# Patient Record
Sex: FEMALE | Race: WHITE | NOT HISPANIC OR LATINO | Employment: FULL TIME | URBAN - METROPOLITAN AREA
[De-identification: names, ages, dates, MRNs, and addresses within clinical notes are randomized per-mention and may not be internally consistent; named-entity substitution may affect disease eponyms.]

---

## 2023-11-17 ENCOUNTER — OFFICE VISIT (OUTPATIENT)
Dept: ORTHOPEDIC SURGERY | Facility: CLINIC | Age: 58
End: 2023-11-17
Payer: COMMERCIAL

## 2023-11-17 DIAGNOSIS — M75.41 IMPINGEMENT SYNDROME OF RIGHT SHOULDER: ICD-10-CM

## 2023-11-17 DIAGNOSIS — M17.11 PRIMARY OSTEOARTHRITIS OF RIGHT KNEE: Primary | ICD-10-CM

## 2023-11-17 PROCEDURE — 99214 OFFICE O/P EST MOD 30 MIN: CPT | Performed by: ORTHOPAEDIC SURGERY

## 2023-11-17 PROCEDURE — 1036F TOBACCO NON-USER: CPT | Performed by: ORTHOPAEDIC SURGERY

## 2023-11-17 RX ORDER — DICLOFENAC SODIUM 75 MG/1
75 TABLET, DELAYED RELEASE ORAL 2 TIMES DAILY
Qty: 60 TABLET | Refills: 2 | Status: SHIPPED | OUTPATIENT
Start: 2023-11-17 | End: 2024-02-15

## 2023-11-17 RX ORDER — TRAZODONE HYDROCHLORIDE 50 MG/1
50 TABLET ORAL NIGHTLY
COMMUNITY

## 2023-11-17 RX ORDER — OMEPRAZOLE 20 MG/1
20 CAPSULE, DELAYED RELEASE ORAL
COMMUNITY

## 2023-11-17 RX ORDER — DIAZEPAM 10 MG/1
10 TABLET ORAL NIGHTLY PRN
COMMUNITY
Start: 2011-08-04 | End: 2024-01-11 | Stop reason: SDUPTHER

## 2023-11-17 RX ORDER — SERTRALINE HYDROCHLORIDE 100 MG/1
200 TABLET, FILM COATED ORAL DAILY
COMMUNITY

## 2023-11-17 RX ORDER — BUSPIRONE HYDROCHLORIDE 10 MG/1
10 TABLET ORAL 2 TIMES DAILY
COMMUNITY
Start: 2022-01-31

## 2023-11-17 RX ORDER — LEVOTHYROXINE SODIUM 100 UG/1
100 TABLET ORAL
COMMUNITY
End: 2024-04-24 | Stop reason: SDUPTHER

## 2023-11-17 NOTE — PROGRESS NOTES
History: Beverly is here for her right knee and shoulder.  She had a previous knee scope a year and a half ago and is getting some recurrent pain and swelling.  Hurts mainly on the inside of the knee.  She also has a shoulder issue.  We tried a cortisone injection but is still having trouble lifting and doing overhead activities.  She has tried meloxicam as well without much improvement.    Past medical history: Multiple  Medications: Multiple  Allergies: No known drug allergies    Please refer to the intake H&P regarding the patient's review of systems, family history and social history as was done today    HEENT: Normal  Lungs: Clear to auscultation  Heart: RRR  Abdomen: Soft, nontender  Skin: clear  Extremity: She has a trace effusion in the right knee.  There is tenderness more medially.  Mild pain medially with Geoff's and over.  Negative Lachman and posterior drawer.  No numbness today.  Shoulders and shows full overhead motion with pain beyond 90 degrees.  She has 5-5 strength but significant pain during stressing.  There is pain with impingement overs as well.  Good passive motion at the side.  Contralateral exam is normal for strength, motion, stability and neurovascular assessment.    Radiographs: Previous knee x-ray showed moderate degenerative change with medial joint space narrowing.  Arthroscopy pictures did show developing arthritic changes well.  Shoulder x-rays were negative.    Assessment: Moderate right knee DJD with recurrent pain and swelling status post prior arthroscopy.  Right shoulder impingement with possible internal derangement.    Plan: She is getting more shoulder pain and dysfunction has had an injection and exercises.  I recommended an MRI and I will see her back to go the results.  In regards to the knee she is getting some recurrent pain and swelling but is trying to remain active.  We will start her on some Voltaren and will stop it causes any stomach upset.  She also like to  pursue viscosupplementation and we will submit for approval.  We will see her back as directed.  All questions were answered today with the patient.    This note was generated with voice recognition software and may contain grammatical errors.

## 2023-11-28 DIAGNOSIS — M75.41 IMPINGEMENT SYNDROME OF RIGHT SHOULDER: ICD-10-CM

## 2023-12-06 ENCOUNTER — APPOINTMENT (OUTPATIENT)
Dept: ORTHOPEDIC SURGERY | Facility: CLINIC | Age: 58
End: 2023-12-06
Payer: COMMERCIAL

## 2023-12-13 ENCOUNTER — APPOINTMENT (OUTPATIENT)
Dept: ORTHOPEDIC SURGERY | Facility: CLINIC | Age: 58
End: 2023-12-13
Payer: COMMERCIAL

## 2023-12-20 ENCOUNTER — APPOINTMENT (OUTPATIENT)
Dept: ORTHOPEDIC SURGERY | Facility: CLINIC | Age: 58
End: 2023-12-20
Payer: COMMERCIAL

## 2023-12-28 ENCOUNTER — APPOINTMENT (OUTPATIENT)
Dept: RADIOLOGY | Facility: HOSPITAL | Age: 58
End: 2023-12-28
Payer: COMMERCIAL

## 2023-12-28 ENCOUNTER — HOSPITAL ENCOUNTER (EMERGENCY)
Facility: HOSPITAL | Age: 58
Discharge: HOME | End: 2023-12-28
Attending: STUDENT IN AN ORGANIZED HEALTH CARE EDUCATION/TRAINING PROGRAM
Payer: COMMERCIAL

## 2023-12-28 VITALS
BODY MASS INDEX: 20.88 KG/M2 | OXYGEN SATURATION: 96 % | WEIGHT: 133 LBS | HEIGHT: 67 IN | TEMPERATURE: 97.2 F | SYSTOLIC BLOOD PRESSURE: 131 MMHG | RESPIRATION RATE: 15 BRPM | HEART RATE: 78 BPM | DIASTOLIC BLOOD PRESSURE: 59 MMHG

## 2023-12-28 DIAGNOSIS — S22.41XA CLOSED FRACTURE OF MULTIPLE RIBS OF RIGHT SIDE, INITIAL ENCOUNTER: Primary | ICD-10-CM

## 2023-12-28 PROCEDURE — 73030 X-RAY EXAM OF SHOULDER: CPT | Mod: RT

## 2023-12-28 PROCEDURE — 72125 CT NECK SPINE W/O DYE: CPT | Performed by: RADIOLOGY

## 2023-12-28 PROCEDURE — 73060 X-RAY EXAM OF HUMERUS: CPT | Mod: RT

## 2023-12-28 PROCEDURE — 72125 CT NECK SPINE W/O DYE: CPT

## 2023-12-28 PROCEDURE — 2500000005 HC RX 250 GENERAL PHARMACY W/O HCPCS: Performed by: STUDENT IN AN ORGANIZED HEALTH CARE EDUCATION/TRAINING PROGRAM

## 2023-12-28 PROCEDURE — 73090 X-RAY EXAM OF FOREARM: CPT | Mod: RIGHT SIDE | Performed by: SURGERY

## 2023-12-28 PROCEDURE — 70450 CT HEAD/BRAIN W/O DYE: CPT

## 2023-12-28 PROCEDURE — 70450 CT HEAD/BRAIN W/O DYE: CPT | Performed by: RADIOLOGY

## 2023-12-28 PROCEDURE — 99284 EMERGENCY DEPT VISIT MOD MDM: CPT | Performed by: STUDENT IN AN ORGANIZED HEALTH CARE EDUCATION/TRAINING PROGRAM

## 2023-12-28 PROCEDURE — 73130 X-RAY EXAM OF HAND: CPT | Mod: RT

## 2023-12-28 PROCEDURE — 73090 X-RAY EXAM OF FOREARM: CPT | Mod: RT

## 2023-12-28 PROCEDURE — 71101 X-RAY EXAM UNILAT RIBS/CHEST: CPT | Mod: RT

## 2023-12-28 PROCEDURE — 73130 X-RAY EXAM OF HAND: CPT | Mod: RIGHT SIDE | Performed by: SURGERY

## 2023-12-28 PROCEDURE — 73030 X-RAY EXAM OF SHOULDER: CPT | Mod: RIGHT SIDE | Performed by: STUDENT IN AN ORGANIZED HEALTH CARE EDUCATION/TRAINING PROGRAM

## 2023-12-28 PROCEDURE — 99285 EMERGENCY DEPT VISIT HI MDM: CPT | Performed by: STUDENT IN AN ORGANIZED HEALTH CARE EDUCATION/TRAINING PROGRAM

## 2023-12-28 PROCEDURE — 73110 X-RAY EXAM OF WRIST: CPT | Mod: RT

## 2023-12-28 PROCEDURE — 71101 X-RAY EXAM UNILAT RIBS/CHEST: CPT | Mod: RIGHT SIDE | Performed by: SURGERY

## 2023-12-28 PROCEDURE — 73110 X-RAY EXAM OF WRIST: CPT | Mod: RIGHT SIDE | Performed by: STUDENT IN AN ORGANIZED HEALTH CARE EDUCATION/TRAINING PROGRAM

## 2023-12-28 PROCEDURE — 73060 X-RAY EXAM OF HUMERUS: CPT | Mod: RIGHT SIDE | Performed by: SURGERY

## 2023-12-28 RX ORDER — LIDOCAINE 560 MG/1
1 PATCH PERCUTANEOUS; TOPICAL; TRANSDERMAL ONCE
Status: DISCONTINUED | OUTPATIENT
Start: 2023-12-28 | End: 2023-12-29 | Stop reason: HOSPADM

## 2023-12-28 RX ORDER — ACETAMINOPHEN 325 MG/1
650 TABLET ORAL ONCE
Status: COMPLETED | OUTPATIENT
Start: 2023-12-28 | End: 2023-12-28

## 2023-12-28 RX ORDER — LIDOCAINE 50 MG/G
1 PATCH TOPICAL DAILY
Qty: 4 PATCH | Refills: 0 | Status: SHIPPED | OUTPATIENT
Start: 2023-12-28 | End: 2024-01-01

## 2023-12-28 RX ADMIN — LIDOCAINE 1 PATCH: 4 PATCH TOPICAL at 23:19

## 2023-12-28 RX ADMIN — ACETAMINOPHEN 650 MG: 325 TABLET ORAL at 20:56

## 2023-12-28 ASSESSMENT — PAIN DESCRIPTION - LOCATION
LOCATION: SHOULDER
LOCATION: OTHER (COMMENT)

## 2023-12-28 ASSESSMENT — COLUMBIA-SUICIDE SEVERITY RATING SCALE - C-SSRS
6. HAVE YOU EVER DONE ANYTHING, STARTED TO DO ANYTHING, OR PREPARED TO DO ANYTHING TO END YOUR LIFE?: NO
2. HAVE YOU ACTUALLY HAD ANY THOUGHTS OF KILLING YOURSELF?: NO
1. IN THE PAST MONTH, HAVE YOU WISHED YOU WERE DEAD OR WISHED YOU COULD GO TO SLEEP AND NOT WAKE UP?: NO

## 2023-12-28 ASSESSMENT — PAIN DESCRIPTION - ORIENTATION: ORIENTATION: RIGHT

## 2023-12-28 ASSESSMENT — PAIN SCALES - GENERAL
PAINLEVEL_OUTOF10: 10 - WORST POSSIBLE PAIN

## 2023-12-28 ASSESSMENT — PAIN - FUNCTIONAL ASSESSMENT: PAIN_FUNCTIONAL_ASSESSMENT: 0-10

## 2023-12-28 ASSESSMENT — LIFESTYLE VARIABLES
EVER FELT BAD OR GUILTY ABOUT YOUR DRINKING: NO
EVER HAD A DRINK FIRST THING IN THE MORNING TO STEADY YOUR NERVES TO GET RID OF A HANGOVER: NO
REASON UNABLE TO ASSESS: NO
HAVE YOU EVER FELT YOU SHOULD CUT DOWN ON YOUR DRINKING: NO
HAVE PEOPLE ANNOYED YOU BY CRITICIZING YOUR DRINKING: NO

## 2023-12-28 ASSESSMENT — PAIN DESCRIPTION - PAIN TYPE: TYPE: ACUTE PAIN

## 2023-12-29 ENCOUNTER — TELEPHONE (OUTPATIENT)
Dept: PRIMARY CARE | Facility: CLINIC | Age: 58
End: 2023-12-29
Payer: COMMERCIAL

## 2023-12-29 NOTE — TELEPHONE ENCOUNTER
PATIENT CALLED IN FOR A REFILL ON HER DIAZEPAM. SHE HAS NOT BEEN SEEN SINCE 02/2023 AND LAST CONTRACT/UDS WAS DONE IN 2022.     LMOM THAT SHE WILL NEED TO SCHEDULE AN APPOINTMENT FOR A REFILL AND DID ADVISE TO LET US KNOW IF SHE NEEDS A SHORT SUPPLY CALLED IN UNTIL HER APPOINTMENT.

## 2023-12-29 NOTE — DISCHARGE INSTRUCTIONS
Please follow up with your primary care physician within 1-2 days.  Please use your incentive spirometer as directed.  If you have shortness of breath, chest pain, worsening symptoms, or other concerning symptoms, please seek immediate medical attention and come back into the ER.    If you have a return or worsening of symptoms, please seek immediate medical attention.
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
## 2024-01-04 ENCOUNTER — ANCILLARY PROCEDURE (OUTPATIENT)
Dept: RADIOLOGY | Facility: CLINIC | Age: 59
End: 2024-01-04
Payer: COMMERCIAL

## 2024-01-04 DIAGNOSIS — M75.41 IMPINGEMENT SYNDROME OF RIGHT SHOULDER: ICD-10-CM

## 2024-01-04 PROCEDURE — 73221 MRI JOINT UPR EXTREM W/O DYE: CPT | Mod: RIGHT SIDE | Performed by: RADIOLOGY

## 2024-01-04 PROCEDURE — 73221 MRI JOINT UPR EXTREM W/O DYE: CPT | Mod: RT
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
## 2024-01-11 ENCOUNTER — OFFICE VISIT (OUTPATIENT)
Dept: PRIMARY CARE | Facility: CLINIC | Age: 59
End: 2024-01-11
Payer: COMMERCIAL

## 2024-01-11 VITALS
DIASTOLIC BLOOD PRESSURE: 70 MMHG | OXYGEN SATURATION: 97 % | RESPIRATION RATE: 18 BRPM | BODY MASS INDEX: 21.11 KG/M2 | HEART RATE: 61 BPM | SYSTOLIC BLOOD PRESSURE: 138 MMHG | WEIGHT: 134.8 LBS

## 2024-01-11 DIAGNOSIS — G47.9 SLEEP DIFFICULTIES: ICD-10-CM

## 2024-01-11 PROCEDURE — 1036F TOBACCO NON-USER: CPT | Performed by: FAMILY MEDICINE

## 2024-01-11 PROCEDURE — 99213 OFFICE O/P EST LOW 20 MIN: CPT | Performed by: FAMILY MEDICINE

## 2024-01-11 RX ORDER — DIAZEPAM 10 MG/1
10 TABLET ORAL NIGHTLY PRN
Qty: 30 TABLET | Refills: 2 | Status: SHIPPED | OUTPATIENT
Start: 2024-01-11 | End: 2024-02-09 | Stop reason: SDUPTHER

## 2024-01-15 ENCOUNTER — APPOINTMENT (OUTPATIENT)
Dept: ORTHOPEDIC SURGERY | Facility: CLINIC | Age: 59
End: 2024-01-15
Payer: COMMERCIAL

## 2024-01-15 ENCOUNTER — OFFICE VISIT (OUTPATIENT)
Dept: ORTHOPEDIC SURGERY | Facility: CLINIC | Age: 59
End: 2024-01-15
Payer: COMMERCIAL

## 2024-01-15 DIAGNOSIS — S46.011A TRAUMATIC COMPLETE TEAR OF RIGHT ROTATOR CUFF, INITIAL ENCOUNTER: Primary | ICD-10-CM

## 2024-01-15 PROCEDURE — 1036F TOBACCO NON-USER: CPT | Performed by: PHYSICIAN ASSISTANT

## 2024-01-15 PROCEDURE — 99214 OFFICE O/P EST MOD 30 MIN: CPT | Performed by: PHYSICIAN ASSISTANT

## 2024-01-15 RX ORDER — CELECOXIB 200 MG/1
200 CAPSULE ORAL DAILY
Qty: 30 CAPSULE | Refills: 0 | Status: SHIPPED | OUTPATIENT
Start: 2024-01-15 | End: 2024-02-14
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
## 2024-01-17 ENCOUNTER — APPOINTMENT (OUTPATIENT)
Dept: ORTHOPEDIC SURGERY | Facility: CLINIC | Age: 59
End: 2024-01-17
Payer: COMMERCIAL

## 2024-01-24 ENCOUNTER — APPOINTMENT (OUTPATIENT)
Dept: ORTHOPEDIC SURGERY | Facility: CLINIC | Age: 59
End: 2024-01-24
Payer: COMMERCIAL

## 2024-01-25 PROBLEM — S46.011A TRAUMATIC ROTATOR CUFF TEAR, RIGHT, INITIAL ENCOUNTER: Status: ACTIVE | Noted: 2024-01-25

## 2024-01-31 ENCOUNTER — APPOINTMENT (OUTPATIENT)
Dept: ORTHOPEDIC SURGERY | Facility: CLINIC | Age: 59
End: 2024-01-31
Payer: COMMERCIAL
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
## 2024-02-01 ENCOUNTER — OFFICE VISIT (OUTPATIENT)
Dept: PRIMARY CARE | Facility: CLINIC | Age: 59
End: 2024-02-01
Payer: COMMERCIAL

## 2024-02-01 VITALS
SYSTOLIC BLOOD PRESSURE: 110 MMHG | HEART RATE: 79 BPM | TEMPERATURE: 98.1 F | OXYGEN SATURATION: 96 % | RESPIRATION RATE: 20 BRPM | DIASTOLIC BLOOD PRESSURE: 68 MMHG

## 2024-02-01 DIAGNOSIS — M25.511 ACUTE PAIN OF RIGHT SHOULDER: Primary | ICD-10-CM

## 2024-02-01 DIAGNOSIS — Z01.818 PREOP EXAMINATION: ICD-10-CM

## 2024-02-01 PROCEDURE — 93000 ELECTROCARDIOGRAM COMPLETE: CPT | Performed by: FAMILY MEDICINE

## 2024-02-01 PROCEDURE — 1036F TOBACCO NON-USER: CPT | Performed by: FAMILY MEDICINE

## 2024-02-01 PROCEDURE — 99213 OFFICE O/P EST LOW 20 MIN: CPT | Performed by: FAMILY MEDICINE

## 2024-02-01 RX ORDER — OXYCODONE AND ACETAMINOPHEN 5; 325 MG/1; MG/1
1 TABLET ORAL EVERY 8 HOURS PRN
Qty: 21 TABLET | Refills: 0 | Status: SHIPPED | OUTPATIENT
Start: 2024-02-01 | End: 2024-02-08

## 2024-02-08 ENCOUNTER — TELEPHONE (OUTPATIENT)
Dept: PRIMARY CARE | Facility: CLINIC | Age: 59
End: 2024-02-08

## 2024-02-08 ENCOUNTER — LAB (OUTPATIENT)
Dept: LAB | Facility: LAB | Age: 59
End: 2024-02-08
Payer: COMMERCIAL

## 2024-02-08 DIAGNOSIS — G47.9 SLEEP DIFFICULTIES: ICD-10-CM

## 2024-02-08 DIAGNOSIS — S46.011A TRAUMATIC ROTATOR CUFF TEAR, RIGHT, INITIAL ENCOUNTER: ICD-10-CM

## 2024-02-08 LAB
ALBUMIN SERPL BCP-MCNC: 4.6 G/DL (ref 3.4–5)
ALP SERPL-CCNC: 59 U/L (ref 33–110)
ALT SERPL W P-5'-P-CCNC: 17 U/L (ref 7–45)
ANION GAP SERPL CALC-SCNC: 10 MMOL/L (ref 10–20)
APPEARANCE UR: ABNORMAL
AST SERPL W P-5'-P-CCNC: 16 U/L (ref 9–39)
BACTERIA #/AREA URNS AUTO: ABNORMAL /HPF
BASOPHILS # BLD AUTO: 0.02 X10*3/UL (ref 0–0.1)
BASOPHILS NFR BLD AUTO: 0.4 %
BILIRUB SERPL-MCNC: 0.3 MG/DL (ref 0–1.2)
BILIRUB UR STRIP.AUTO-MCNC: NEGATIVE MG/DL
BUN SERPL-MCNC: 16 MG/DL (ref 6–23)
CALCIUM SERPL-MCNC: 9.8 MG/DL (ref 8.6–10.3)
CHLORIDE SERPL-SCNC: 103 MMOL/L (ref 98–107)
CO2 SERPL-SCNC: 31 MMOL/L (ref 21–32)
COLOR UR: YELLOW
CREAT SERPL-MCNC: 0.77 MG/DL (ref 0.5–1.05)
EGFRCR SERPLBLD CKD-EPI 2021: 89 ML/MIN/1.73M*2
EOSINOPHIL # BLD AUTO: 0.02 X10*3/UL (ref 0–0.7)
EOSINOPHIL NFR BLD AUTO: 0.4 %
ERYTHROCYTE [DISTWIDTH] IN BLOOD BY AUTOMATED COUNT: 12.4 % (ref 11.5–14.5)
GLUCOSE SERPL-MCNC: 97 MG/DL (ref 74–99)
GLUCOSE UR STRIP.AUTO-MCNC: NEGATIVE MG/DL
HCT VFR BLD AUTO: 39.3 % (ref 36–46)
HGB BLD-MCNC: 13.2 G/DL (ref 12–16)
IMM GRANULOCYTES # BLD AUTO: 0.01 X10*3/UL (ref 0–0.7)
IMM GRANULOCYTES NFR BLD AUTO: 0.2 % (ref 0–0.9)
INR PPP: 0.9 (ref 0.9–1.1)
KETONES UR STRIP.AUTO-MCNC: NEGATIVE MG/DL
LEUKOCYTE ESTERASE UR QL STRIP.AUTO: ABNORMAL
LYMPHOCYTES # BLD AUTO: 1.24 X10*3/UL (ref 1.2–4.8)
LYMPHOCYTES NFR BLD AUTO: 23.4 %
MCH RBC QN AUTO: 30.1 PG (ref 26–34)
MCHC RBC AUTO-ENTMCNC: 33.6 G/DL (ref 32–36)
MCV RBC AUTO: 90 FL (ref 80–100)
MONOCYTES # BLD AUTO: 0.34 X10*3/UL (ref 0.1–1)
MONOCYTES NFR BLD AUTO: 6.4 %
NEUTROPHILS # BLD AUTO: 3.68 X10*3/UL (ref 1.2–7.7)
NEUTROPHILS NFR BLD AUTO: 69.2 %
NITRITE UR QL STRIP.AUTO: NEGATIVE
NRBC BLD-RTO: 0 /100 WBCS (ref 0–0)
PH UR STRIP.AUTO: 6 [PH]
PLATELET # BLD AUTO: 198 X10*3/UL (ref 150–450)
POTASSIUM SERPL-SCNC: 4 MMOL/L (ref 3.5–5.3)
PROT SERPL-MCNC: 6.8 G/DL (ref 6.4–8.2)
PROT UR STRIP.AUTO-MCNC: NEGATIVE MG/DL
PROTHROMBIN TIME: 10.6 SECONDS (ref 9.8–12.8)
RBC # BLD AUTO: 4.39 X10*6/UL (ref 4–5.2)
RBC # UR STRIP.AUTO: NEGATIVE /UL
RBC #/AREA URNS AUTO: ABNORMAL /HPF
SODIUM SERPL-SCNC: 140 MMOL/L (ref 136–145)
SP GR UR STRIP.AUTO: 1.02
SQUAMOUS #/AREA URNS AUTO: ABNORMAL /HPF
UROBILINOGEN UR STRIP.AUTO-MCNC: <2 MG/DL
WBC # BLD AUTO: 5.3 X10*3/UL (ref 4.4–11.3)
WBC #/AREA URNS AUTO: ABNORMAL /HPF

## 2024-02-08 PROCEDURE — 87086 URINE CULTURE/COLONY COUNT: CPT

## 2024-02-08 PROCEDURE — 80053 COMPREHEN METABOLIC PANEL: CPT

## 2024-02-08 PROCEDURE — 85610 PROTHROMBIN TIME: CPT

## 2024-02-08 PROCEDURE — 36415 COLL VENOUS BLD VENIPUNCTURE: CPT

## 2024-02-08 PROCEDURE — 85025 COMPLETE CBC W/AUTO DIFF WBC: CPT

## 2024-02-08 PROCEDURE — 81001 URINALYSIS AUTO W/SCOPE: CPT

## 2024-02-08 NOTE — TELEPHONE ENCOUNTER
Called patient and lvmtcb    Unfortunately her EKG did not save in our system for her pre-op at her LOV. When patient returns the call, please have her stop by the office for nurse visit EKG.

## 2024-02-09 LAB
BACTERIA UR CULT: NORMAL
HOLD SPECIMEN: NORMAL

## 2024-02-09 NOTE — TELEPHONE ENCOUNTER
----- Message from Beverly Lucas sent at 2/8/2024 11:57 PM EST -----  Regarding: Urinalysis   Contact: [phone]  Sierra Malone!!! Please look over my urine lab results. Apparently I have a UTI or something going on. I don’t have any symptoms of a UTI but it looks like I have one. Can you prescribe me an antibiotic or does Dr. Talavera have to? The sooner the better I get on one. Also, the Valium script you sent to [facility] was filled for only 30 pills with no refills. In the past you’ve written it for a 3 month supply with no refills.  Your nurse informed me that you cannot prescribe three months worth anymore. She said you wrote it with 2 refills. It shows no refills. Can you send a Rx to [facility] closer to my surgery date with a refill? I only take it at night to help me sleep. The Percocet is working just okay. I still get up in the middle of the night in a lot of pain. Thank you for everything!!!!! Beverly Lucas

## 2024-02-13 ENCOUNTER — TELEPHONE (OUTPATIENT)
Dept: PRIMARY CARE | Facility: CLINIC | Age: 59
End: 2024-02-13
Payer: COMMERCIAL

## 2024-02-13 NOTE — TELEPHONE ENCOUNTER
Urine Culture  Order: [id number] - Reflex for Order [id number]  Collected 2/8/2024 17:29       Status: Final result       Visible to patient: Yes (seen)       Dx: Traumatic rotator cuff tear, right, i...    Specimen Information: Clean Catch/Voided; Urine   0 Result Notes  Urine Culture No significant growth           Resulting Agency: [facility]           Specimen Collected: 02/08/24 17:29

## 2024-02-13 NOTE — TELEPHONE ENCOUNTER
Please advise.    Urine Culture  Order: [id number] - Reflex for Order [id number]  Collected 2/8/2024 17:29       Status: Final result       Visible to patient: Yes (seen)       Dx: Traumatic rotator cuff tear, right, i...    Specimen Information: Clean Catch/Voided; Urine   0 Result Notes  Urine Culture No significant growth           Resulting Agency: [facility]           Specimen Collected: 02/08/24 17:29

## 2024-02-13 NOTE — TELEPHONE ENCOUNTER
Chad Malone MD  [id number] Clinical Support Staff16 minutes ago (2:18 PM)       Do not see results for urine culture

## 2024-02-13 NOTE — TELEPHONE ENCOUNTER
PATIENT CALLING - REPORTS THAT SHE IS AWAITING URINE CULTURE RESULTS TO SEE IF SHE NEEDS TO GO ON AN ANTIBIOTIC - THE ONLY THING I CAN SEE IS URINE CULTURE ORDERS BY DR. GALLAGHER - CAN SOMEONE HELP PATIENT?

## 2024-02-13 NOTE — TELEPHONE ENCOUNTER
LMOM for patient to call back.      Chad Malone MD  [id number] [place] [id number] Clinical Support Staff11 minutes ago (3:37 PM)       Urine culture is negative.  She is cleared for the surgery.

## 2024-02-14 ENCOUNTER — OFFICE VISIT (OUTPATIENT)
Dept: ORTHOPEDIC SURGERY | Facility: CLINIC | Age: 59
End: 2024-02-14
Payer: COMMERCIAL

## 2024-02-14 ENCOUNTER — EVALUATION (OUTPATIENT)
Dept: PHYSICAL THERAPY | Facility: CLINIC | Age: 59
End: 2024-02-14
Payer: COMMERCIAL

## 2024-02-14 DIAGNOSIS — G89.29 CHRONIC RIGHT SHOULDER PAIN: Primary | ICD-10-CM

## 2024-02-14 DIAGNOSIS — G89.18 POST-OPERATIVE PAIN: ICD-10-CM

## 2024-02-14 DIAGNOSIS — M25.511 CHRONIC RIGHT SHOULDER PAIN: Primary | ICD-10-CM

## 2024-02-14 DIAGNOSIS — S46.011A TRAUMATIC COMPLETE TEAR OF RIGHT ROTATOR CUFF, INITIAL ENCOUNTER: ICD-10-CM

## 2024-02-14 DIAGNOSIS — S46.011A TRAUMATIC COMPLETE TEAR OF RIGHT ROTATOR CUFF, INITIAL ENCOUNTER: Primary | ICD-10-CM

## 2024-02-14 PROCEDURE — L3670 SO ACRO/CLAV CAN WEB PRE OTS: HCPCS | Performed by: PHYSICIAN ASSISTANT

## 2024-02-14 PROCEDURE — 97161 PT EVAL LOW COMPLEX 20 MIN: CPT | Mod: GP

## 2024-02-14 PROCEDURE — 97535 SELF CARE MNGMENT TRAINING: CPT | Mod: GP

## 2024-02-14 PROCEDURE — 99024 POSTOP FOLLOW-UP VISIT: CPT | Performed by: PHYSICIAN ASSISTANT

## 2024-02-14 PROCEDURE — 1036F TOBACCO NON-USER: CPT | Performed by: PHYSICIAN ASSISTANT

## 2024-02-14 RX ORDER — SODIUM CHLORIDE, SODIUM LACTATE, POTASSIUM CHLORIDE, CALCIUM CHLORIDE 600; 310; 30; 20 MG/100ML; MG/100ML; MG/100ML; MG/100ML
100 INJECTION, SOLUTION INTRAVENOUS CONTINUOUS
Status: CANCELLED | OUTPATIENT
Start: 2024-02-14

## 2024-02-14 RX ORDER — OXYCODONE AND ACETAMINOPHEN 5; 325 MG/1; MG/1
1 TABLET ORAL EVERY 6 HOURS PRN
Qty: 20 TABLET | Refills: 0 | Status: SHIPPED | OUTPATIENT
Start: 2024-02-14 | End: 2024-02-19

## 2024-02-14 ASSESSMENT — PATIENT HEALTH QUESTIONNAIRE - PHQ9
1. LITTLE INTEREST OR PLEASURE IN DOING THINGS: NOT AT ALL
2. FEELING DOWN, DEPRESSED OR HOPELESS: SEVERAL DAYS
10. IF YOU CHECKED OFF ANY PROBLEMS, HOW DIFFICULT HAVE THESE PROBLEMS MADE IT FOR YOU TO DO YOUR WORK, TAKE CARE OF THINGS AT HOME, OR GET ALONG WITH OTHER PEOPLE: SOMEWHAT DIFFICULT
SUM OF ALL RESPONSES TO PHQ9 QUESTIONS 1 AND 2: 1

## 2024-02-14 ASSESSMENT — ENCOUNTER SYMPTOMS
DEPRESSION: 1
LOSS OF SENSATION IN FEET: 0
OCCASIONAL FEELINGS OF UNSTEADINESS: 0

## 2024-02-14 NOTE — PROGRESS NOTES
Beverly Lucas is here today for a pre-op visit of her right shoulder.  She is scheduled for a right arthroscopic rotator cuff repair.    This is a pre-op visit to discuss the risks and benefits of surgery. The risks and benefits were fully explained to the patient. The patient wishes to proceed.     With any surgery, infection is a risk; usually 1-2%. It can become higher for individuals with diabetes, rheumatoid arthritis, previous surgery, individuals on oral steroids, or obese individuals. All of these issues were properly addressed and considered. Reassured all sterile techniques would be followed.  Severe infections may require removal of any prosthesis.    The patient will be given preop antibiotics. It was also explained to the patient that there will be some blood loss during the procedure but that blood transfusion is usually not necessary. It is also noted that with knee surgery a tourniquet is applied to lower the amount of blood loss during the case.    Pulmonary embolism and blood clots were also discussed with the patient and told that these can have fatal complications. It is explained to the patient that the use of KARIE hose, foot pumps, incentive spirometers, quick mobility and the use of blood thinners/Aspirin for a period of 21-28 days is the standard preventative care.     It was explained that surgery is often used to decrease pain, provide stability, and improve strength but individuals will have varying results postoperatively. There can be variation in motion and a risk of stiffness. It is also stated that occasionally we will have to manipulate an extremity if pain and stiffness persist. We also discussed there are limitations with some motions after certain surgeries.     Fracture, though rare, may also occur intraoperatively. There is also the possibility of nerve or vascular injuries as well. There is potential for continued numbness or tingling. The benefits of anesthesia were explained  to the patient.     All of the patient's questions were answered.     Results/Imaging: MRI showed a full-thickness tear of the supraspinatus and anterior infraspinatus with approximately 3 cm of retraction.  There is moderate atrophy noted.    Assessment: Full-thickness right rotator cuff tear    Plan:   I have personally reviewed the OARRS report for this patient. This report is scanned into the electronic medical record. I have considered the risks of abuse, dependence, addiction, and diversion. The patient's current pain level is 5.  Post operative pain medication was sent to the patient's pharmacy.  The patient will not begin taking this until after surgery.     We did discuss biceps options and she would prefer a biceps tenodesis if needed.    She did give us work leave paperwork and she would like to take at least a month of work.    She will follow up 1 week post op.    This note was generated with voice recognition software and may contain grammatical errors.
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
## 2024-02-15 RX ORDER — DIAZEPAM 10 MG/1
10 TABLET ORAL NIGHTLY PRN
Qty: 30 TABLET | Refills: 2 | Status: SHIPPED | OUTPATIENT
Start: 2024-02-15 | End: 2024-05-24 | Stop reason: SDUPTHER

## 2024-02-15 NOTE — TELEPHONE ENCOUNTER
Rebtelt message sent.     Chad Malone MD  [id number] [place] [id number] Clinical Support Staff36 minutes ago (12:51 PM)       Prescription was sent in

## 2024-02-16 ENCOUNTER — APPOINTMENT (OUTPATIENT)
Dept: PRIMARY CARE | Facility: CLINIC | Age: 59
End: 2024-02-16
Payer: COMMERCIAL

## 2024-02-20 NOTE — PREPROCEDURE INSTRUCTIONS
Pre op instructions given. Pt. NPO at midnight. Pt. Verbalized understanding.

## 2024-02-21 ENCOUNTER — APPOINTMENT (OUTPATIENT)
Dept: PHYSICAL THERAPY | Facility: CLINIC | Age: 59
End: 2024-02-21
Payer: COMMERCIAL

## 2024-02-21 ENCOUNTER — APPOINTMENT (OUTPATIENT)
Dept: ORTHOPEDIC SURGERY | Facility: CLINIC | Age: 59
End: 2024-02-21
Payer: COMMERCIAL

## 2024-02-22 ENCOUNTER — HOSPITAL ENCOUNTER (OUTPATIENT)
Facility: HOSPITAL | Age: 59
Setting detail: OUTPATIENT SURGERY
Discharge: HOME | End: 2024-02-22
Attending: ORTHOPAEDIC SURGERY | Admitting: ORTHOPAEDIC SURGERY
Payer: COMMERCIAL

## 2024-02-22 ENCOUNTER — ANESTHESIA (OUTPATIENT)
Dept: OPERATING ROOM | Facility: HOSPITAL | Age: 59
End: 2024-02-22
Payer: COMMERCIAL

## 2024-02-22 ENCOUNTER — ANESTHESIA EVENT (OUTPATIENT)
Dept: OPERATING ROOM | Facility: HOSPITAL | Age: 59
End: 2024-02-22
Payer: COMMERCIAL

## 2024-02-22 VITALS
SYSTOLIC BLOOD PRESSURE: 118 MMHG | BODY MASS INDEX: 20.76 KG/M2 | HEART RATE: 75 BPM | WEIGHT: 132.28 LBS | RESPIRATION RATE: 17 BRPM | HEIGHT: 67 IN | DIASTOLIC BLOOD PRESSURE: 68 MMHG | OXYGEN SATURATION: 96 % | TEMPERATURE: 97 F

## 2024-02-22 DIAGNOSIS — S46.011A TRAUMATIC ROTATOR CUFF TEAR, RIGHT, INITIAL ENCOUNTER: Primary | ICD-10-CM

## 2024-02-22 PROBLEM — E03.9 HYPOTHYROIDISM: Status: ACTIVE | Noted: 2024-02-22

## 2024-02-22 PROBLEM — F41.9 ANXIETY: Status: ACTIVE | Noted: 2024-02-22

## 2024-02-22 PROCEDURE — 3700000001 HC GENERAL ANESTHESIA TIME - INITIAL BASE CHARGE: Performed by: ORTHOPAEDIC SURGERY

## 2024-02-22 PROCEDURE — 2780000003 HC OR 278 NO HCPCS: Performed by: ORTHOPAEDIC SURGERY

## 2024-02-22 PROCEDURE — 2720000007 HC OR 272 NO HCPCS: Performed by: ORTHOPAEDIC SURGERY

## 2024-02-22 PROCEDURE — 2500000004 HC RX 250 GENERAL PHARMACY W/ HCPCS (ALT 636 FOR OP/ED): Performed by: PHYSICIAN ASSISTANT

## 2024-02-22 PROCEDURE — 29826 SHO ARTHRS SRG DECOMPRESSION: CPT | Performed by: PHYSICIAN ASSISTANT

## 2024-02-22 PROCEDURE — 3600000004 HC OR TIME - INITIAL BASE CHARGE - PROCEDURE LEVEL FOUR: Performed by: ORTHOPAEDIC SURGERY

## 2024-02-22 PROCEDURE — 29827 SHO ARTHRS SRG RT8TR CUF RPR: CPT | Performed by: PHYSICIAN ASSISTANT

## 2024-02-22 PROCEDURE — 29827 SHO ARTHRS SRG RT8TR CUF RPR: CPT | Performed by: ORTHOPAEDIC SURGERY

## 2024-02-22 PROCEDURE — 2500000004 HC RX 250 GENERAL PHARMACY W/ HCPCS (ALT 636 FOR OP/ED): Performed by: STUDENT IN AN ORGANIZED HEALTH CARE EDUCATION/TRAINING PROGRAM

## 2024-02-22 PROCEDURE — 7100000009 HC PHASE TWO TIME - INITIAL BASE CHARGE: Performed by: ORTHOPAEDIC SURGERY

## 2024-02-22 PROCEDURE — 29826 SHO ARTHRS SRG DECOMPRESSION: CPT | Performed by: ORTHOPAEDIC SURGERY

## 2024-02-22 PROCEDURE — 3600000009 HC OR TIME - EACH INCREMENTAL 1 MINUTE - PROCEDURE LEVEL FOUR: Performed by: ORTHOPAEDIC SURGERY

## 2024-02-22 PROCEDURE — 7100000010 HC PHASE TWO TIME - EACH INCREMENTAL 1 MINUTE: Performed by: ORTHOPAEDIC SURGERY

## 2024-02-22 PROCEDURE — 29823 SHO ARTHRS SRG XTNSV DBRDMT: CPT | Performed by: PHYSICIAN ASSISTANT

## 2024-02-22 PROCEDURE — 2500000005 HC RX 250 GENERAL PHARMACY W/O HCPCS: Performed by: STUDENT IN AN ORGANIZED HEALTH CARE EDUCATION/TRAINING PROGRAM

## 2024-02-22 PROCEDURE — 3700000002 HC GENERAL ANESTHESIA TIME - EACH INCREMENTAL 1 MINUTE: Performed by: ORTHOPAEDIC SURGERY

## 2024-02-22 PROCEDURE — 7100000001 HC RECOVERY ROOM TIME - INITIAL BASE CHARGE: Performed by: ORTHOPAEDIC SURGERY

## 2024-02-22 PROCEDURE — 29823 SHO ARTHRS SRG XTNSV DBRDMT: CPT | Performed by: ORTHOPAEDIC SURGERY

## 2024-02-22 PROCEDURE — 7100000002 HC RECOVERY ROOM TIME - EACH INCREMENTAL 1 MINUTE: Performed by: ORTHOPAEDIC SURGERY

## 2024-02-22 PROCEDURE — C1713 ANCHOR/SCREW BN/BN,TIS/BN: HCPCS | Performed by: ORTHOPAEDIC SURGERY

## 2024-02-22 DEVICE — SPEEDBRG IMP SYS W/BIO-COMP SWVLK
Type: IMPLANTABLE DEVICE | Site: SHOULDER | Status: FUNCTIONAL
Brand: ARTHREX®

## 2024-02-22 RX ORDER — DEXAMETHASONE SODIUM PHOSPHATE 4 MG/ML
INJECTION, SOLUTION INTRA-ARTICULAR; INTRALESIONAL; INTRAMUSCULAR; INTRAVENOUS; SOFT TISSUE AS NEEDED
Status: DISCONTINUED | OUTPATIENT
Start: 2024-02-22 | End: 2024-02-22

## 2024-02-22 RX ORDER — SODIUM CHLORIDE, SODIUM LACTATE, POTASSIUM CHLORIDE, CALCIUM CHLORIDE 600; 310; 30; 20 MG/100ML; MG/100ML; MG/100ML; MG/100ML
100 INJECTION, SOLUTION INTRAVENOUS CONTINUOUS
Status: DISCONTINUED | OUTPATIENT
Start: 2024-02-22 | End: 2024-02-22 | Stop reason: HOSPADM

## 2024-02-22 RX ORDER — ONDANSETRON HYDROCHLORIDE 2 MG/ML
INJECTION, SOLUTION INTRAVENOUS AS NEEDED
Status: DISCONTINUED | OUTPATIENT
Start: 2024-02-22 | End: 2024-02-22

## 2024-02-22 RX ORDER — CEFAZOLIN SODIUM 2 G/100ML
2 INJECTION, SOLUTION INTRAVENOUS ONCE
Status: COMPLETED | OUTPATIENT
Start: 2024-02-22 | End: 2024-02-22

## 2024-02-22 RX ORDER — MIDAZOLAM HYDROCHLORIDE 1 MG/ML
INJECTION, SOLUTION INTRAMUSCULAR; INTRAVENOUS AS NEEDED
Status: DISCONTINUED | OUTPATIENT
Start: 2024-02-22 | End: 2024-02-22

## 2024-02-22 RX ORDER — LABETALOL HYDROCHLORIDE 5 MG/ML
5 INJECTION, SOLUTION INTRAVENOUS ONCE AS NEEDED
Status: DISCONTINUED | OUTPATIENT
Start: 2024-02-22 | End: 2024-02-22 | Stop reason: HOSPADM

## 2024-02-22 RX ORDER — PROPOFOL 10 MG/ML
INJECTION, EMULSION INTRAVENOUS AS NEEDED
Status: DISCONTINUED | OUTPATIENT
Start: 2024-02-22 | End: 2024-02-22

## 2024-02-22 RX ORDER — OXYCODONE HYDROCHLORIDE 5 MG/1
5 TABLET ORAL EVERY 4 HOURS PRN
Status: DISCONTINUED | OUTPATIENT
Start: 2024-02-22 | End: 2024-02-22 | Stop reason: HOSPADM

## 2024-02-22 RX ORDER — ROCURONIUM BROMIDE 10 MG/ML
INJECTION, SOLUTION INTRAVENOUS AS NEEDED
Status: DISCONTINUED | OUTPATIENT
Start: 2024-02-22 | End: 2024-02-22

## 2024-02-22 RX ORDER — FENTANYL CITRATE 50 UG/ML
25 INJECTION, SOLUTION INTRAMUSCULAR; INTRAVENOUS EVERY 5 MIN PRN
Status: DISCONTINUED | OUTPATIENT
Start: 2024-02-22 | End: 2024-02-22 | Stop reason: HOSPADM

## 2024-02-22 RX ORDER — LIDOCAINE HYDROCHLORIDE 20 MG/ML
INJECTION, SOLUTION INFILTRATION; PERINEURAL AS NEEDED
Status: DISCONTINUED | OUTPATIENT
Start: 2024-02-22 | End: 2024-02-22

## 2024-02-22 RX ORDER — ONDANSETRON HYDROCHLORIDE 2 MG/ML
4 INJECTION, SOLUTION INTRAVENOUS ONCE AS NEEDED
Status: DISCONTINUED | OUTPATIENT
Start: 2024-02-22 | End: 2024-02-22 | Stop reason: HOSPADM

## 2024-02-22 RX ORDER — SODIUM CHLORIDE 0.9 G/100ML
IRRIGANT IRRIGATION AS NEEDED
Status: DISCONTINUED | OUTPATIENT
Start: 2024-02-22 | End: 2024-02-22 | Stop reason: HOSPADM

## 2024-02-22 RX ADMIN — CEFAZOLIN SODIUM 2 G: 2 INJECTION, SOLUTION INTRAVENOUS at 07:43

## 2024-02-22 RX ADMIN — ONDANSETRON 4 MG: 2 INJECTION, SOLUTION INTRAMUSCULAR; INTRAVENOUS at 08:48

## 2024-02-22 RX ADMIN — MIDAZOLAM 2 MG: 1 INJECTION INTRAMUSCULAR; INTRAVENOUS at 07:19

## 2024-02-22 RX ADMIN — LIDOCAINE HYDROCHLORIDE 60 MG: 20 INJECTION, SOLUTION INFILTRATION; PERINEURAL at 07:36

## 2024-02-22 RX ADMIN — SODIUM CHLORIDE, SODIUM LACTATE, POTASSIUM CHLORIDE, AND CALCIUM CHLORIDE 100 ML/HR: 600; 310; 30; 20 INJECTION, SOLUTION INTRAVENOUS at 07:00

## 2024-02-22 RX ADMIN — DEXAMETHASONE SODIUM PHOSPHATE 8 MG: 4 INJECTION, SOLUTION INTRAMUSCULAR; INTRAVENOUS at 07:43

## 2024-02-22 RX ADMIN — PROPOFOL 150 MG: 10 INJECTION, EMULSION INTRAVENOUS at 07:36

## 2024-02-22 RX ADMIN — DEXAMETHASONE SODIUM PHOSPHATE: 10 INJECTION INTRAMUSCULAR; INTRAVENOUS at 07:31

## 2024-02-22 RX ADMIN — PROPOFOL 50 MG: 10 INJECTION, EMULSION INTRAVENOUS at 08:47

## 2024-02-22 RX ADMIN — ROCURONIUM BROMIDE 50 MG: 10 SOLUTION INTRAVENOUS at 07:36

## 2024-02-22 RX ADMIN — SODIUM CHLORIDE, SODIUM LACTATE, POTASSIUM CHLORIDE, AND CALCIUM CHLORIDE: 600; 310; 30; 20 INJECTION, SOLUTION INTRAVENOUS at 07:31

## 2024-02-22 RX ADMIN — SUGAMMADEX 200 MG: 100 INJECTION, SOLUTION INTRAVENOUS at 08:54

## 2024-02-22 SDOH — HEALTH STABILITY: MENTAL HEALTH: CURRENT SMOKER: 0

## 2024-02-22 ASSESSMENT — PAIN - FUNCTIONAL ASSESSMENT
PAIN_FUNCTIONAL_ASSESSMENT: 0-10

## 2024-02-22 ASSESSMENT — PAIN DESCRIPTION - DESCRIPTORS: DESCRIPTORS: SHARP;THROBBING;BURNING

## 2024-02-22 ASSESSMENT — PAIN SCALES - GENERAL
PAINLEVEL_OUTOF10: 0 - NO PAIN
PAINLEVEL_OUTOF10: 0 - NO PAIN
PAINLEVEL_OUTOF10: 5 - MODERATE PAIN
PAINLEVEL_OUTOF10: 0 - NO PAIN
PAINLEVEL_OUTOF10: 0 - NO PAIN
PAINLEVEL_OUTOF10: 6
PAINLEVEL_OUTOF10: 5 - MODERATE PAIN

## 2024-02-22 ASSESSMENT — COLUMBIA-SUICIDE SEVERITY RATING SCALE - C-SSRS
2. HAVE YOU ACTUALLY HAD ANY THOUGHTS OF KILLING YOURSELF?: NO
6. HAVE YOU EVER DONE ANYTHING, STARTED TO DO ANYTHING, OR PREPARED TO DO ANYTHING TO END YOUR LIFE?: NO
1. IN THE PAST MONTH, HAVE YOU WISHED YOU WERE DEAD OR WISHED YOU COULD GO TO SLEEP AND NOT WAKE UP?: NO

## 2024-02-22 NOTE — DISCHARGE INSTRUCTIONS
Medication given may have significant effects after discharge. Therefore on the day of surgery:  1) you must be accompanied by a responsible adult upon discharge and for 24 hours after surgery.  Do not drive a motor vehicle, operate machinery, power tools or appliance, drink alcoholic beverages, or make critical decisions for 24 hours  2) Be aware of dizziness, which may cause a fall. Change positions slowly.  3) Eating: you may resume your regular diet but it is better to increase intake slowly with mild foods and working up to your regular diet. No greasy, fried or spicy foods today.  4) Nausea/Vomiting: Nausea and vomiting may occur as you become more active or begin to increase food intake. If this should happen, decrease activity and return to liquids. If the problem persists, call your surgeon  5) Pain: Your surgeon may have given you a prescription for pain medication. Take pain medication with food as prescribed. Pain medication may cause constipation, so drink plenty of fluids. If your pain medication does not provide adequate relief, call your surgeon  6) Urinating: Notify your surgeon if you have not urinated within 12 hours after discharge  7) Ice: Apply ice to operative site for 20 min 5-6 times a day or use Polar care as instructed  8) Dressing:   []   Remove dressing in 24hr    [x]  Remove dressing in 48hr   []  Leave open to air after initial dressing is taken off and incision is dry  Do not remove the steri-strips. (no bath/ hot tubs/ pools)   []  Leave dressing in place. Keep dressing/ incision clean and dry    9) Activity    Shoulder/ elbow/Hand   []  Elevate extremity    [x]  Sling   [x] at all times (except for exercises and showering)  [] as needed only for comfort   [] Begin daily motion exercises out of sling as instructed   [x]  Bend and flex fingers frequently   [] other   Knee/ Ankle/ Foot   [] elevate extremity   [] crutches        [] non-weight bearing to operative extremity  []  partial weight bearing  [] Full weight bearing as tolerated   []  Use brace whenever walking   [] other      10) Begin physical therapy if advised by you physician:   [] before returning to see you doctor   [x] not until you follow up with your doctor    11) call your doctor at [phone] for an appointment (or follow up as scheduled)    Contact [facility] office if  Increased redness, swelling, drainage of any kind, and/or pain to surgery site.  As well as new onset fevers and or chills.  These could signify an infection.  Calf or thigh tenderness to touch as well as increased swelling or redness.  This could signify a clot formation.  Numbness or tingling to an area around the incision site or below the incision site (toes). Or if the operative extremity becomes cold, blue.  Any rash appears, increased  or new onset nausea/vomiting occur.  This may indicate a reaction to a medication.  Temp is 38.5 C (101F)  12) If you have any concerns or questions, please call [facility] surgeon on call. The 24- hour phone is [phone]  13) If you are unable to contact your surgeon, in an emergency situation, go to the nearest hospital    General Anesthesia Discharge Instructions    About this topic  You may need general anesthesia if you need to be asleep during a procedure. Your doctor will use drugs to block the signals that go from your nerves to your brain. Doctors give general anesthesia during a surgery or procedure to:  Allow you to sleep  Help your body be still  Relax your muscles  Help you to relax and be pain free  Keep you from remembering the surgery  Let the doctor manage your airway, breathing, and blood flow  The doctor or nurse anesthetist gives general anesthesia by a shot into your vein. Sometimes, you may breathe in a gas through a mask placed over your face.  What care is needed at home?  Ask your doctor what you need to do when you go home. Make sure you ask questions if you do  not understand what the doctor says.  Your doctor may give you drugs to prevent or treat an upset stomach from the anesthetic. Take them as ordered.  If your throat is sore, suck on ice chips or popsicles to ease throat pain.  Put 2 to 3 pillows under your head and back when you lie down to help you breathe easier.  For the first 24 to 48 hours:  Do not operate heavy or dangerous machinery.  Do not make major decisions or sign important papers. You may not be able to think clearly.  Avoid beer, wine, or mixed drinks.  You are at a higher risk of falling for at least 24 hours after general anesthesia.  Take extra care when you get up.  Do not change positions quickly.  Do not rush when you need to go to the bathroom or to answer the phone.  Ask for help if you feel unsteady when you try to walk.  Wear shoes with non-slip soles and low heels.  What follow-up care is needed?  Your doctor may ask you to come back to the office to check on your progress. Be sure to keep these visits.  If you have stitches that do not dissolve or staples, you will need to have them removed. Your doctor will want to do this in 1 to 2 weeks. If the doctor used skin glue, the glue will fall off on its own.  What drugs may be needed?  The doctor may order drugs to:  Help with pain  Treat an upset stomach or throwing up  Will physical activity be limited?  You will not be allowed to drive right away after the procedure. Ask a family member or a friend to drive you home.  Avoid trying to get out of bed without help until you are sure of your balance.  You may have to limit your activity. Talk to your doctor about if you need to limit how much you lift or limit exercise after your procedure.  What changes to diet are needed?  Start with a light diet when you are fully awake. This includes things that are easy to swallow like soups, pudding, jello, toast, and eggs. Slowly progress to your normal diet.  What problems could happen?  Low blood  pressure  Breathing problems  Upset stomach or throwing up  Dizziness  Blood clots  Infection  When do I need to call the doctor?  Trouble breathing  Upset stomach or throwing up more than 3 times in the next 2 days  Dizziness  Teach Back: Helping You Understand  The Teach Back Method helps you understand the information we are giving you. After you talk with the staff, tell them in your own words what you learned. This helps to make sure the staff has described each thing clearly. It also helps to explain things that may have been confusing. Before going home, make sure you can do these:  I can tell you about my procedure.  I can tell you if I need to follow up with my doctor.  I can tell you what is good for me to eat and drink the next day.  I can tell you what I would do if I have trouble breathing, an upset stomach, or dizziness.  Where can I learn more?  [employer]  https://www.nigms.nih.gov/education/pages/factsheet_Anesthesia.aspx  NHS Choices  http://www.nhs.uk/conditions/Anaesthetic-general/Pages/Definition.aspx  Last Reviewed Date  2020-04-22  Nerve Blocks    Why is this procedure done?  Nerve blocks can help to manage pain. By giving you a drug into an exact group of nerves, your doctor may be able to block pain to a specific part of your body. Some nerve blocks are used after surgery, especially if you have had an abdominal surgery. Nerve blocks are used before surgery to lessen the need for opioid drugs during and after surgery.  There are a few kinds of nerve blocks. Some nerve blocks are used to:  Treat pain. These may have a pain drug and a drug to help with swelling.  Find where your pain is coming from. This kind of nerve block will have a pain drug that lasts for only a certain amount of time.  See if another kind of treatment like surgery will help your pain.  Prevent pain during or after a procedure.  Help you avoid surgery.  Give relief of pain during and  after surgery.  Lessen the use of opioid drugs needed for pain after surgery.  Block the pain in an area of the body during surgery as anesthesia.  What will the results be?  The nerve block may help to treat or ease your pain. The area may be numb. You may have some pain relief right away. You may be able to use fewer pain medicines after surgery. It also may be easier for you to move around after surgery. Some nerve blocks go away within a few hours. Others give you pain relief for a day or so to a few months or longer. Some nerve blocks can take a few days to work fully.  What happens before the procedure?  Your doctor will ask you about your health history. Talk to the doctor about:  All the drugs you are taking. Be sure to include all prescription, over the counter, and herbal supplements. Tell the doctor if you have any drug allergy. Bring a list of drugs you take with you.  Any bleeding problems. Be sure to tell your doctor if you are taking any drugs that may cause bleeding. Some of these are warfarin, rivaroxaban, apixaban, ticagrelor, clopidogrel, ibuprofen, naproxen, or aspirin. Certain vitamins and herbs, such as garlic and fish oil, may also add to the risk for bleeding. You may need to stop these drugs as well. Talk to your doctor about them.  What happens during the procedure?  Sometimes, the doctor will give you a special drug to make you sleepy for the nerve block. Other times, you are completely awake. You may also have a nerve block as a part of your surgery.  The doctor will position you in a way to give them easy access to where you will be having the nerve block.  The doctor will clean the area and give you a local numbing drug. The doctor will use a long thin needle to give you the nerve block. Often, the doctor will use a special x-ray, ultrasound, or CT scan to make sure the needle is in the right place. The doctor will inject the drug close to the nerve that is causing your pain. Sometimes  they inject the drug in an area that will block pain from a few nerves.  The doctor will take out the needle and place a clean bandage on your skin.  Sometimes, the doctor may leave a catheter in place to deliver medicine over 1 to 2 days. You may have to return to your doctor's office to have the catheter removed.  The procedure takes 15 to 30 minutes.  What happens after the procedure?  If you are not having surgery, you will be able to go home the same day. You may be asked to rest for 15 to 30 minutes. If the doctor gives you a special drug to make you sleepy for the procedure, you will need someone to drive you home.  What care is needed at home?  You will be allowed to shower or take a bath later that same day unless you have a catheter still in place.  You may need other medicines to help with pain as your nerve block wears off.  What follow-up care is needed?  As your body absorbs the drugs, your pain may come back. Talk to your doctor about if you need another nerve block and how often you can have a nerve block.  What problems could happen?  Infection  Bleeding or bruising  Pain at the injection site  Raised blood sugar  Rash  Itching  Numbness  Nerve injury  Allergic reaction  Puncture or laceration of an organ like the liver, spleen. or bowel  Numbing medicine injected into a blood vessel  Where can I learn more?  American Society of Regional Anesthesia  https://www.carmen.com/patient-information/regional-anesthesia  NHS  https://www.nhs.uk/conditions/epidural/  NHS  https://www.nhs.uk/conditions/local-anaesthesia/  [employer] [place]  http://www.radiologyinfo.org/en/info.cfm?pg=nerveblock  Last Reviewed Date  2020-04-22

## 2024-02-22 NOTE — ANESTHESIA PREPROCEDURE EVALUATION
Beverly Lucas is a 58 y.o. female here for:    ARTHROSCOPY/ROTATOR CUFF REPAIR ARTHREX - 2/14  With Daron Condon MD  Pre-Op Diagnosis Codes:     * Traumatic rotator cuff tear, right, initial encounter [S46.011A]    Relevant Problems   Endocrine   (+) Hypothyroidism      Neuro/Psych   (+) Anxiety      Musculoskeletal   (+) Traumatic rotator cuff tear, right, initial encounter      Other   (+) Impingement syndrome of right shoulder       Lab Results   Component Value Date    HGB 13.2 02/08/2024    HCT 39.3 02/08/2024    WBC 5.3 02/08/2024     02/08/2024     02/08/2024    K 4.0 02/08/2024     02/08/2024    CREATININE 0.77 02/08/2024    BUN 16 02/08/2024       Social History     Tobacco Use   Smoking Status Former    Types: Cigarettes   Smokeless Tobacco Never       Allergies   Allergen Reactions    Erythromycin Other     Pain in left arm and chest pain    heart palpitations and severe pain in left arm    Amoxicillin Diarrhea, GI Upset, Nausea And Vomiting and Other    Sulfa (Sulfonamide Antibiotics) Other     Unsure of reaction    Chest pain weakness left arm       Current Outpatient Medications   Medication Instructions    busPIRone (BUSPAR) 10 mg, oral, 3 times daily    diazePAM (VALIUM) 10 mg, oral, Nightly PRN    LevoxyL 100 mcg, oral, Daily before breakfast    omeprazole (PRILOSEC) 20 mg, oral, Daily before breakfast    sertraline (ZOLOFT) 50 mg, oral, Daily RT    traZODone (DESYREL) 50 mg, oral, Nightly       Past Surgical History:   Procedure Laterality Date    FOOT SURGERY      OTHER SURGICAL HISTORY  06/18/2021    Colonoscopy    WISDOM TOOTH EXTRACTION         No family history on file.    NPO Details:  NPO/Void Status  Date of Last Liquid: 02/21/24  Time of Last Liquid: 2200  Date of Last Solid: 02/21/24  Time of Last Solid: 2130        Physical Exam    Airway  Mallampati: II  TM distance: >3 FB     Cardiovascular    Dental - normal exam     Pulmonary    Abdominal            Anesthesia  Plan    History of general anesthesia?: yes  History of complications of general anesthesia?: no    ASA 2     general and regional     The patient is not a current smoker.    intravenous induction   Postoperative administration of opioids is intended.  Trial extubation is planned.  Anesthetic plan and risks discussed with patient.

## 2024-02-22 NOTE — ANESTHESIA PROCEDURE NOTES
Airway  Date/Time: 2/22/2024 7:40 AM  Urgency: elective    Airway not difficult    Staffing  Performed: attending   Authorized by: Aivnash Maher MD    Performed by: Avinash Maher MD  Patient location during procedure: OR    Indications and Patient Condition  Indications for airway management: anesthesia  Spontaneous Ventilation: absent  Sedation level: deep  Preoxygenated: yes  Patient position: sniffing  Mask difficulty assessment: 1 - vent by mask  Planned trial extubation    Final Airway Details  Final airway type: endotracheal airway      Successful airway: ETT  Cuffed: yes   Successful intubation technique: direct laryngoscopy  Endotracheal tube insertion site: oral  Blade: Jigar  Blade size: #4  ETT size (mm): 7.0  Cormack-Lehane Classification: grade I - full view of glottis  Placement verified by: capnometry   Measured from: lips  ETT to lips (cm): 21  Number of attempts at approach: 1

## 2024-02-22 NOTE — ANESTHESIA PROCEDURE NOTES
Peripheral Block    Patient location during procedure: holding area  Start time: 2/22/2024 7:19 AM  End time: 2/22/2024 7:26 AM  Reason for block: at surgeon's request and post-op pain management  Staffing  Performed: attending   Authorized by: Avinash Maher MD    Performed by: Avinash Maher MD  Preanesthetic Checklist  Completed: patient identified, IV checked, site marked, risks and benefits discussed, surgical consent, monitors and equipment checked, pre-op evaluation and timeout performed   Timeout performed at: 2/22/2024 7:19 AM  Peripheral Block  Patient position: laying flat  Prep: ChloraPrep  Patient monitoring: heart rate, cardiac monitor and continuous pulse ox  Block type: interscalene  Laterality: right  Injection technique: single-shot  Guidance: ultrasound guided  Infiltration strength: 2 %  Dose: 3 mL  Needle  Needle gauge: 21 G  Needle length: 10 cm  Needle localization: ultrasound guidance     image stored in chart  Assessment  Injection assessment: negative aspiration for heme, no paresthesia on injection, incremental injection and local visualized surrounding nerve on ultrasound  Paresthesia pain: none  Heart rate change: no  Slow fractionated injection: yes  Additional Notes  Following timeout, sedation administered for patient comfort. Skin prep with chlorhexadine, local infiltration with 2% lidocaine. US for visualization of structures, real-time visualization of needle entry, visualization of local anesthetic spread. Aspiration negative for heme. 20cc 0.5% ropivacaine with 5mg dexamethasone injected in divided doses. Patient awake and conversant throughout, tolerated well with no apparent complications.

## 2024-02-22 NOTE — ANESTHESIA POSTPROCEDURE EVALUATION
Patient: Beverly Lucas    Procedure Summary       Date: 02/22/24 Room / Location: [facility] / [facility]    Anesthesia Start: 0731 Anesthesia Stop: 0901    Procedure: ARTHROSCOPY/ROTATOR CUFF REPAIR ARTHREX - 2/14 (Right: Shoulder) Diagnosis:       Traumatic rotator cuff tear, right, initial encounter      (Traumatic rotator cuff tear, right, initial encounter [S46.011A])    Surgeons: Daron Condon MD Responsible Provider: Avinash Maher MD    Anesthesia Type: general, regional ASA Status: 2            Anesthesia Type: general, regional    Vitals Value Taken Time   /69 02/22/24 0904   Temp 36.0 02/22/24 0904   Pulse 75 02/22/24 0904   Resp 14 02/22/24 0904   SpO2 99 % 02/22/24 0903   Vitals shown include unvalidated device data.    Anesthesia Post Evaluation    Patient location during evaluation: PACU  Patient participation: complete - patient participated  Level of consciousness: awake and alert  Pain management: adequate  Multimodal analgesia pain management approach  Airway patency: patent  Cardiovascular status: acceptable  Respiratory status: acceptable  Hydration status: acceptable  Postoperative Nausea and Vomiting: none        No notable events documented.

## 2024-02-22 NOTE — OP NOTE
ARTHROSCOPY/ROTATOR CUFF REPAIR ARTHREX - 2/14 (R) Operative Note    Preop diagnosis: Right rotator cuff tear, impingement    Postop diagnosis: Same    Procedure:   Right shoulder arthroscopic rotator cuff repair  Right shoulder arthroscopic subacromial decompression  Right shoulder arthroscopic extensive debridement of glenohumeral joint, labrum, biceps and rotator cuff fraying    Surgeon: Daron Condon MD  Assistant: Carlene Arora PA-C      Findings: see procedure details    EBL: minimal    Procedure Details:   Indications:  The patient is a 58-year-old female with a history of a right shoulder injury.  They were found to have a full-thickness rotator cuff tear with 3 cm of retraction as well as significant impingement.  The patient had failed multiple conservative measures.  Risks and benefits of arthroscopic rotator cuff repair were noted with the patient and family.  These include infection, stiffness, nerve damage, continued pain as well as need for subsequent operations.  Informed consent was obtained prior to arrival in the operating.    Procedure: Upon arrival the patient was identified.  She was transported from a stretcher to the operating table placed in supine position.  A general endotracheal anesthetic and block were administered by the anesthesia staff.  Prior to the start of the case intravenous antibiotics were administered.  The patient was positioned with the left side down in the lateral decubitus position.  All bony prominences were adequately padded.  The right arm was prepped and draped in the usual sterile fashion.  A standard posterior arthroscopy portal was established and the arthroscope was inserted into the glenohumeral joint.  There is some grade 1 change noted throughout the glenohumeral joint.  There was degenerative fraying of the labrum anteriorly and posteriorly.  There was mild fraying of the biceps.  There is a full thickness tear of the supraspinatus tendon with  retraction of 2-3 cm.  Under direct visualization an anterior portal was established.  The glenohumeral joint, labrum, biceps and rotator cuff tearing were debrided back to stable tissue.  Attention was then directed to the subacromial space.  A thorough bursectomy was performed and a large anterior spur was identified.  A decompression was performed through the lateral portal.  The rotator cuff tear was easily identified.  There was good tissue mobilization and thickness.  There was delamination of the infraspinatus tendon posteriorly with a superior and inferior flap.  However, we were easily able to mobilize cuff tissue down to bone.  Bone over the greater tuberosity was debrided to expose surface.  2 suture tape stitches were placed in the delamination posteriorly bringing the infraspinatus tear together nicely.  2 Arthrex bio swivel lock anchors with added fiber tapes were then placed adjacent to the articular surface, 1 anteriorly and 1 posteriorly..  Using the Expresso device mattress sutures were placed in both fiber tapes and fiber wires.  The fiber wires were tied medially while the tapes were placed anteriorly and posteriorly.  Laterally two bio swivel lock anchors were placed in a modified suture bridge type fixation was performed encompassing both fiber tapes and fiber wires.  Final probing and rotation showed excellent stability to the repair.   The space was irrigated through the shaver and suctioned dry.  Portal sites were closed with 3-0 nylon suture.  All sponge and needle counts was correct prior to closure.  A sterile dressing was applied.  The patient was placed into a sling awoken from the anesthetic.  They were taken to the recovery room in stable condition.    Carlene Arora PA-C was present throughout the entire case. Given the nature of the disease process and the procedure, a skilled surgical first assistant was necessary during the case. The assistant was necessary to hold retractors  and to manipulate the extremity during the procedure. A certified scrub tech was at the back table managing the instruments and supplies for the surgical case.    Complications:  None; patient tolerated the procedure well.    Disposition: PACU - hemodynamically stable.  Condition: stable         Daron Condon  Phone Number: [phone]

## 2024-02-28 ENCOUNTER — APPOINTMENT (OUTPATIENT)
Dept: ORTHOPEDIC SURGERY | Facility: CLINIC | Age: 59
End: 2024-02-28
Payer: COMMERCIAL

## 2024-02-28 ENCOUNTER — OFFICE VISIT (OUTPATIENT)
Dept: ORTHOPEDIC SURGERY | Facility: CLINIC | Age: 59
End: 2024-02-28
Payer: COMMERCIAL

## 2024-02-28 DIAGNOSIS — S46.011A TRAUMATIC COMPLETE TEAR OF RIGHT ROTATOR CUFF, INITIAL ENCOUNTER: Primary | ICD-10-CM

## 2024-02-28 PROCEDURE — 99024 POSTOP FOLLOW-UP VISIT: CPT | Performed by: PHYSICIAN ASSISTANT

## 2024-02-28 PROCEDURE — 1036F TOBACCO NON-USER: CPT | Performed by: PHYSICIAN ASSISTANT

## 2024-02-28 NOTE — PROGRESS NOTES
History of Present Illness: Beverly Lucas is here today for a postop visit of her shoulder.  She is 1 week out from a right arthroscopic rotator cuff repair.  She is still having a bit of soreness but overall is doing well.    Physical Exam: On exam today of the shoulder, the wounds are healing nicely.  There is no redness or drainage.  Swelling and ecchymosis are normal for this stage of healing.  She has appropriate passive range of motion at the side.  Elbow, wrist, and hand are moving well. She is neurovascularly intact distally.    Radiographs: None today    Assessment: Stable right arthroscopic rotator cuff repair, 1 week out    Plan: Her sutures were removed. Benzoin and Steri-Strips were applied.  She is going to continue to wear the sling at all times except for hygiene purposes.  I have urged her to continue with an icing regimen. She was given a therapy note today. She can do 30/90/90 passive range of motion for 3 weeks followed by full passive range of motion until follow-up.  She will avoid any active shoulder motion. She is going to follow-up in another 3 to 4 weeks to assess progress.  If doing well at that time, she can begin to wean from the sling and advance her therapy.  All questions were answered with the patient.

## 2024-03-12 ENCOUNTER — EVALUATION (OUTPATIENT)
Dept: PHYSICAL THERAPY | Facility: CLINIC | Age: 59
End: 2024-03-12
Payer: COMMERCIAL

## 2024-03-12 DIAGNOSIS — S46.011D TRAUMATIC COMPLETE TEAR OF RIGHT ROTATOR CUFF, SUBSEQUENT ENCOUNTER: ICD-10-CM

## 2024-03-12 PROCEDURE — 97162 PT EVAL MOD COMPLEX 30 MIN: CPT | Mod: GP | Performed by: GENERAL ACUTE CARE HOSPITAL

## 2024-03-12 PROCEDURE — 97110 THERAPEUTIC EXERCISES: CPT | Mod: GP | Performed by: GENERAL ACUTE CARE HOSPITAL

## 2024-03-12 NOTE — PROGRESS NOTES
Patient Name: Beverly Lucas  MRN: [id number]  Today's Date: 3/12/2024     Current Problem:  1. Traumatic complete tear of right rotator cuff, subsequent encounter  Referral to Physical Therapy          Visit 1/20  S/p 2/22/24 2 weeks 5 days post op   Right shoulder arthroscopic rotator cuff repair  Right shoulder arthroscopic subacromial decompression  Right shoulder arthroscopic extensive debridement of glenohumeral joint, labrum, biceps and rotator cuff fraying    Primary Complaint/ Functional Limitation: right UE dominant, unable to use right arm functionally, s/pm RCR from massive tear   Prior level of function:independent   Date of onset:12/28/23  Cause:pushed down stairs landing on right shoulder   Pain Location:right arm and shoulder   Current Pain:6/10  Worst Pain:10/10  Description of pain: achey and tight   Aggravating Activities: use of right UE   Relieving Activities:800 mg motrin   Work Requirements/ Hobbies:pt    Prior Treatment and results of Prior treatment: surgery   Constitutional Symptoms: Patient Denies   Fever Chills Nausea Vomiting Loss of appetite Abdominal pain Change in bowel function Weight loss or gain Headache Unexplained fatigue Malaise Lethargy Weakness Arthralgia  Myalgia  Difficulty in sleeping Breathing trouble  Barriers to treatment/ learning: none     Posture:   Head: forward   Shoulders: bilateral forward    Scapula Rest: protracted right down rotated    Scapula Elevation: right NT     Palpation: tenderness throughout right shoulder musculature     QuickDash= 70%  Shoulder ROM (degrees)   Shoulder Flexion R/L: 80/170   Shoulder Internal Rotation R/L: 25/ 70   Shoulder External Rotation R/L: 10/80    Strength:   Shoulder Flexion R/L:   nt #,   5/5   Shoulder Extension R/L:  nt #,   5/5   Shoulder Internal Rotation R/L:   nt#,   5/5   (45deg abd)   Shoulder External Rotation R/L:    nt#,   5/5    (45deg abd)    Evaluation, Tests and measures, Education including HEP  instruction and feedback. Patient appears to be compliant and motivated to perform HEP as instructed and participate in active physical therapy as indicated. The patient was educated on: the importance of positioning, proper posture, and body mechanics, joint mechanics and pathology, general tissue healing time, the appropriate use of heat and cold to control pain and inflammation, the importance of general therapeutic exercise, especially to stay within pain-free ROM, specific anatomy, function, & regional interdependence of involved areas, & likely cause of impairments & POC.  Patient's questions were answered to their satisfaction, & patient verbalized understanding & agreement with POC.  The patient presents to Physical Therapy with signs and symptoms consistent with the medical diagnosis of traumatic rtc tear 12/28/23, sp rcr surgery 2/22/24. Key impairments include:  pain decreased ROM strength and difficulty with functional activities such as any use of right UE (dominant arm). Skilled PT is required to address these key impairments and to provide and progress with an appropriate home exercise program. This evaluation is of  moderate complexity due to the stable/changing/unstable nature of the patient's presentation as well as the comorbidities and medical factors included in this evaluation.  Treatment may include: Therapeutic Exercise (PROM, AA/AROM, Flexibility, Strengthening, Stabilization, HEP instruction). Therapeutic Activities (Transfers, Body Mechanics, Work Related Activities, Closed Chain, Agility and Power).   Manual Techniques (Soft tissue Mobilization, Joint mobilization/Distraction, Muscle Energy Techniques, Lymphatic Drainage, Dry Needling).  Neuromuscular Reeducation (Postural Training, Balance/Proprioception, Relaxation Techniques). Biofeedback. Aquatic Exercise. Modalities: Ultrasound, Moist Heat, Cryotherapy, Vasopneumatic with or without Cryotherapy. Electrical Stimulation (TENS/IFC/ Pre  modulated for pain relief, NMES for Muscle reeducation). Gait Training. Orthotic Fit and Training. Strapping, Kinesio taping.   Patient will report resting pain on Visual Analog Scale < or = 0 .   Pain at worst will be < or = 0 .   Pain with (patients primary complaint) will be < or = 0 .    QuickDash score will improve >/= 16 points to demonstrate improved upper extremity functional abilities.    Shoulder AROM will be >/= 170  deg Flexion,  70 deg Internal Rotation,   80 deg External Rotation to demonstrates improved joint mechanics during functional activities.     Patient will Cross Body Reach to or beyond contralateral AC joint to demonstrate improved shoulder mechanics.    Patient will raise affected arm overhead with improved scapular humeral rhythm to prevent shoulder pathomechanics.    Strength of shoulder flexion will be >/=    5/5 , shoulder abduction>/= 5/5   , Shoulder External Rotation >/= 5/5   , Shoulder Internal Rotation >/= 5/5    to improve joint stability with functional use of the upper extremities.    Rhomboid, Middle Trapezius, and Lower Trapezius muscle test will be >/= 4/5 to demonstrate improved scapular stabilization to improve functional use of upper extremities.    Scapular rotation with extremity elevation overhead will be 50-60 degrees without deviation or eccentric winging to demonstrate improved stabilization and reduction of impingement position.    Patient will demonstrate improved posture without cueing through therapy session indicated by neutral cervical tilt, neutral shoulder position, and decreased cervical lordosis      Patient will correctly perform home exercise program independently, demonstrated through patient teach back upon discharge.

## 2024-03-14 ENCOUNTER — TREATMENT (OUTPATIENT)
Dept: PHYSICAL THERAPY | Facility: CLINIC | Age: 59
End: 2024-03-14
Payer: COMMERCIAL

## 2024-03-14 DIAGNOSIS — S46.011D TRAUMATIC COMPLETE TEAR OF RIGHT ROTATOR CUFF, SUBSEQUENT ENCOUNTER: ICD-10-CM

## 2024-03-14 PROCEDURE — 97110 THERAPEUTIC EXERCISES: CPT | Mod: GP | Performed by: GENERAL ACUTE CARE HOSPITAL

## 2024-03-14 PROCEDURE — 97140 MANUAL THERAPY 1/> REGIONS: CPT | Mod: GP | Performed by: GENERAL ACUTE CARE HOSPITAL

## 2024-03-14 NOTE — PROGRESS NOTES
Patient Name: Beverly Lucas  MRN: [id number]  Today's Date: 3/14/2024     Current Problem:  1. Traumatic complete tear of right rotator cuff, subsequent encounter  Follow Up In Physical Therapy          Visit 2/20    Subjective:  Change in pain/ pain level: 5/10  Patient comments: takes me twice as long to do everything. Not too bad after last treatment    Objective: right shoulder 100 deg     Treatment:    Therapeutic exercise (97330):  PROM  Manual Therapy (72348):  Glenohumeral mobilization A-P Inferior Lateral grade 3/4 Multiple planes  Stretch elevation, IR, ER, cross body  Scapular mobilization all planes/ manual scapular stabilization  TPR subscapularis, Upper Trapezius, Pectoralis major, Pectoralis minor, latissimus dorsi    Assessment:  Patient notes improved pain levels resultant from activities in therapy session. Improved tissue quality noted as well as body mechanics demonstrated after cueing and corrections. Patient continues to require active therapy to progress functional capabilities with decreasing pain levels and improving mechanics with functional activities including progression of Home exercise program. Tolerance to today's treatment was good. Muscle fatigue noted.  Patient appears motivated and compliant this date, demonstrating a working understanding of principals instructed and home program.    Plan:  Progress with functional strength as tolerated with respect to tissue healing. Manual therapy PRN to improve/maintain ROM, prevent adhesion, reduce pain.

## 2024-03-19 ENCOUNTER — TREATMENT (OUTPATIENT)
Dept: PHYSICAL THERAPY | Facility: CLINIC | Age: 59
End: 2024-03-19
Payer: COMMERCIAL

## 2024-03-19 DIAGNOSIS — S46.011D TRAUMATIC COMPLETE TEAR OF RIGHT ROTATOR CUFF, SUBSEQUENT ENCOUNTER: ICD-10-CM

## 2024-03-19 PROCEDURE — 97140 MANUAL THERAPY 1/> REGIONS: CPT | Mod: GP

## 2024-03-19 NOTE — PROGRESS NOTES
"Physical Therapy Treatment      Patient Name: Beverly Lucas  MRN: [id number]  Today's Date: 3/19/2024  Visit #3     Insurance:  Visit Limit: 20    Assessment:  Beverly presents to therapy w/ observable improvements in PROM. She responded very well to manual today - noting decrease pain and tightness. Reviewed POC and post-op progressions. Reviewed HEP and answered all questions in full.     Patient's response to session: Decreased pain and Increased ROM/joint mobility    Patient is likely to benefit from skilled interventions to address their impairments, and treatment that includes: manual techniques to decrease pain and improve joint/soft-tissue mobility, as well as exercises to increase strength, endurance, and neuromotor control.     Plan:  Continue per POC.    Current Problem:   1. Traumatic complete tear of right rotator cuff, subsequent encounter  Follow Up In Physical Therapy          Subjective   Beverly notes that her should has been \"a little more achy\" lately. Notes most of her discomfort is locater in her posterior deltoid and biceps.      Objective   SH PROM (R/L)  Flexion: 100 / 170  IR: 25 / 70  ER: 20 / 80    Treatments:    Therapeutic Exercise (89309):  HEP reviewed:   Education: Discussed post-op protocol/progression of care    Manual Therapy (44090):  GHJ: Distraction w/ and w/o AP: Grade III  GHJ: Distraction w/ and w/o Inferior: Grade III  PROM: All direction to tolerance  ST mobilization: All planes: Grade III  TPR: Subscap, UT, lat, pec minor  First-rib mobilization in supine: Grade III      Education and discussion on HEP and treatment regarding the benefits related to current condition, POC, pathophysiology, and precautions    *added to HEP  "

## 2024-03-21 ENCOUNTER — TREATMENT (OUTPATIENT)
Dept: PHYSICAL THERAPY | Facility: CLINIC | Age: 59
End: 2024-03-21
Payer: COMMERCIAL

## 2024-03-21 DIAGNOSIS — S46.011D TRAUMATIC COMPLETE TEAR OF RIGHT ROTATOR CUFF, SUBSEQUENT ENCOUNTER: ICD-10-CM

## 2024-03-21 PROCEDURE — 97110 THERAPEUTIC EXERCISES: CPT | Mod: GP

## 2024-03-21 PROCEDURE — 97140 MANUAL THERAPY 1/> REGIONS: CPT | Mod: GP

## 2024-03-21 NOTE — PROGRESS NOTES
"Physical Therapy Treatment      Patient Name: Beverly Lucas  MRN: [id number]  Today's Date: 3/21/2024  Visit #4     Insurance:  Visit Limit: 20     Assessment:  Beverly presents to therapy w/ improvement in her familiar pain since last session. She continues to respond very well to manual. Progressed HEP to incorporate AA and isometrics to tolerance today. Educated on appropriate intensity and avoiding pushing through pain.     Patient's response to session: Decreased pain    Patient is likely to benefit from skilled interventions to address their impairments, and treatment that includes: manual techniques to decrease pain and improve joint/soft-tissue mobility, as well as exercises to increase strength, endurance, and neuromotor control.     Plan:  Continue per POC.    Current Problem:   1. Traumatic complete tear of right rotator cuff, subsequent encounter  Follow Up In Physical Therapy          Subjective   Beverly notes improvements in her pain intensity since last session. Adds that she found manual to be helpful.     S/p 2/22/24  - 4 weeks post op   Right shoulder arthroscopic rotator cuff repair  Right shoulder arthroscopic subacromial decompression  Right shoulder arthroscopic extensive debridement of glenohumeral joint, labrum, biceps and rotator cuff fraying    Current pain 4/10    Objective   SH PROM (R/L)  Flexion: 100 / 170  IR: 25 / 70  ER: 20 / 80    Treatments:  Therapeutic Exercise (98737):  HEP updated today: Handout provided: completed in full  *AA wand press  *AA ER in neutral   *ISO SH ext  *ISO SH ABD    Discussed appropriate intensity/frequency, avoiding pushing through pain.     Manual Therapy (24820):  GHJ: Distraction w/ and w/o AP: Grade III-IV  GHJ: Distraction w/ and w/o Inferior: Grade III-IV  TPR: Subscap, UT, lat, pec minor  ST mobilization: All planes: Grade III-IV  DN: Subscapularis / 2\" / x 2 / winding / no adverse response    Education and discussion on HEP and treatment regarding the " benefits related to current condition, POC, pathophysiology, and precautions    *added to HEP

## 2024-03-25 ENCOUNTER — TREATMENT (OUTPATIENT)
Dept: PHYSICAL THERAPY | Facility: CLINIC | Age: 59
End: 2024-03-25
Payer: COMMERCIAL

## 2024-03-25 DIAGNOSIS — S46.011D TRAUMATIC COMPLETE TEAR OF RIGHT ROTATOR CUFF, SUBSEQUENT ENCOUNTER: ICD-10-CM

## 2024-03-25 PROCEDURE — 97140 MANUAL THERAPY 1/> REGIONS: CPT | Mod: GP | Performed by: GENERAL ACUTE CARE HOSPITAL

## 2024-03-25 PROCEDURE — 97110 THERAPEUTIC EXERCISES: CPT | Mod: GP | Performed by: GENERAL ACUTE CARE HOSPITAL

## 2024-03-25 NOTE — PROGRESS NOTES
Patient Name: Beverly Lucas  MRN: [id number]  Today's Date: 3/25/2024     Current Problem:  1. Traumatic complete tear of right rotator cuff, subsequent encounter  Follow Up In Physical Therapy          Visit 5/20    Subjective:  Change in pain/ pain level: 4/10    Objective: right shoulder 110 PROM     Treatment:    Therapeutic exercise (74526):  PROM   Manual Therapy (78313):  Glenohumeral mobilization A-P Inferior Lateral grade 1/2 Multiple planes  Stretch elevation, IR, ER, cross body  Scapular mobilization all planes/ manual scapular stabilization  TPR subscapularis, Upper Trapezius, Pectoralis major, Pectoralis minor, latissimus dorsi  Thoracic P-A Rib P-a    Assessment:  Improving ROM. Patient notes improved pain levels resultant from activities in therapy session. Improved tissue quality noted as well as body mechanics demonstrated after cueing and corrections. Patient continues to require active therapy to progress functional capabilities with decreasing pain levels and improving mechanics with functional activities including progression of Home exercise program. Tolerance to today's treatment was good. Muscle fatigue noted.  Patient appears motivated and compliant this date, demonstrating a working understanding of principals instructed and home program.    Plan:  Progress with functional strength as tolerated with respect to tissue healing. Manual therapy PRN to improve/maintain ROM, prevent adhesion, reduce pain.

## 2024-03-27 ENCOUNTER — OFFICE VISIT (OUTPATIENT)
Dept: ORTHOPEDIC SURGERY | Facility: CLINIC | Age: 59
End: 2024-03-27
Payer: COMMERCIAL

## 2024-03-27 DIAGNOSIS — S46.011A TRAUMATIC COMPLETE TEAR OF RIGHT ROTATOR CUFF, INITIAL ENCOUNTER: Primary | ICD-10-CM

## 2024-03-27 PROCEDURE — 99024 POSTOP FOLLOW-UP VISIT: CPT | Performed by: ORTHOPAEDIC SURGERY

## 2024-03-27 PROCEDURE — 1036F TOBACCO NON-USER: CPT | Performed by: ORTHOPAEDIC SURGERY
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
## 2024-03-27 NOTE — LETTER
March 27, 2024     Patient: Beverly Lucas   YOB: 1965   Date of Visit: 3/27/2024       To Whom it May Concern:    Beverly Lucas was seen in my clinic on 3/27/2024. She  may return to work on 4/22/24 with restrictions. Wear sling as needed. No lift/carry more than 2 pounds with right arm .    If you have any questions or concerns, please don't hesitate to call.         Sincerely,          Daron Condon MD        CC: No Recipients

## 2024-03-29 ENCOUNTER — TREATMENT (OUTPATIENT)
Dept: PHYSICAL THERAPY | Facility: CLINIC | Age: 59
End: 2024-03-29
Payer: COMMERCIAL

## 2024-03-29 DIAGNOSIS — S46.011D TRAUMATIC COMPLETE TEAR OF RIGHT ROTATOR CUFF, SUBSEQUENT ENCOUNTER: ICD-10-CM

## 2024-03-29 PROCEDURE — 97110 THERAPEUTIC EXERCISES: CPT | Mod: GP | Performed by: GENERAL ACUTE CARE HOSPITAL

## 2024-03-29 PROCEDURE — 97140 MANUAL THERAPY 1/> REGIONS: CPT | Mod: GP | Performed by: GENERAL ACUTE CARE HOSPITAL

## 2024-03-29 NOTE — PROGRESS NOTES
Patient Name: Beverly Lucas  MRN: [id number]  Today's Date: 3/29/2024     Current Problem:  1. Traumatic complete tear of right rotator cuff, subsequent encounter  Follow Up In Physical Therapy          Visit 6/20    Subjective:  Change in pain/ pain level: 6/10  Patient comments: MD wants increased emphasis on ROM. Bumper out, ween after 2-3 weeks. 1-2 # max, may begin AROM.     Objective: Right ER 45 deg after MTT     Treatment:    Therapeutic exercise (78499):  Access Code: [email or web address]  URL: [email or web address]  Date: 03/29/2024  Prepared by: Renny Hinojosa    Exercises  - Circular Shoulder Pendulum with Table Support  - 1-2 x daily - 7 x weekly - 20 reps  - Flexion-Extension Shoulder Pendulum with Table Support  - 1-2 x daily - 7 x weekly - 20 reps  - Seated Shoulder Flexion AAROM with Pulley Behind  - 1-2 x daily - 7 x weekly - 20 reps  - Seated Shoulder Scaption AAROM with Pulley at Side  - 1-2 x daily - 7 x weekly - 20 reps  - Standing Isometric Shoulder External Rotation with Doorway  - 1-2 x daily - 7 x weekly - 10 reps - 5 hold  - Standing Shoulder Extension with Dowel  - 1-2 x daily - 7 x weekly - 20 reps  - Supine Shoulder Press with Dowel  - 1-2 x daily - 7 x weekly - 20 reps  - Supine Shoulder Flexion Extension AAROM with Dowel  - 1-2 x daily - 7 x weekly - 20 reps  - Supine Shoulder External Rotation with Dowel  - 1-2 x daily - 7 x weekly - 20 reps  - Cervical Retraction with Overpressure  - 8 x daily - 7 x weekly - 10 reps  - Seated Cervical Retraction and Extension  - 8 x daily - 7 x weekly - 10 reps  Manual Therapy (05777):  Glenohumeral mobilization A-P Inferior Lateral grade 3/4 Multiple planes  Stretch elevation, IR, ER, cross body  Scapular mobilization all planes/ manual scapular stabilization  TPR subscapularis, Upper Trapezius, Pectoralis major, Pectoralis minor, latissimus dorsi    Assessment:  Progressed HEP.  Patient notes improved pain levels resultant from  activities in therapy session. Improved tissue quality noted as well as body mechanics demonstrated after cueing and corrections. Patient continues to require active therapy to progress functional capabilities with decreasing pain levels and improving mechanics with functional activities including progression of Home exercise program. Tolerance to today's treatment was good. Muscle fatigue noted.  Patient appears motivated and compliant this date, demonstrating a working understanding of principals instructed and home program.    Plan:  Progress with functional strength as tolerated with respect to tissue healing. Manual therapy PRN to improve/maintain ROM, prevent adhesion, reduce pain.

## 2024-04-02 ENCOUNTER — TREATMENT (OUTPATIENT)
Dept: PHYSICAL THERAPY | Facility: CLINIC | Age: 59
End: 2024-04-02
Payer: COMMERCIAL

## 2024-04-02 DIAGNOSIS — G89.29 CHRONIC RIGHT SHOULDER PAIN: ICD-10-CM

## 2024-04-02 DIAGNOSIS — S46.011D TRAUMATIC COMPLETE TEAR OF RIGHT ROTATOR CUFF, SUBSEQUENT ENCOUNTER: Primary | ICD-10-CM

## 2024-04-02 DIAGNOSIS — M25.511 CHRONIC RIGHT SHOULDER PAIN: ICD-10-CM

## 2024-04-02 PROCEDURE — 97140 MANUAL THERAPY 1/> REGIONS: CPT | Mod: GP

## 2024-04-02 PROCEDURE — 97110 THERAPEUTIC EXERCISES: CPT | Mod: GP

## 2024-04-02 NOTE — PROGRESS NOTES
"Physical Therapy Treatment      Patient Name: Beverly Lucas  MRN: [id number]  Today's Date: 4/2/2024  Visit #6/20  Time Calculation  Start Time: 1000  Stop Time: 1043  Time Calculation (min): 43 min    Assessment:  Beverly presents to therapy demonstrating improved PROM. Reviewed proper form/intensity during pulley's today. She continues to respond well to manual - w/ continued ROM improvements observed upon completion. Will continue to progress as tolerated.     Patient's response to session: Increased ROM/joint mobility    Patient is likely to benefit from skilled interventions to address their impairments, and treatment that includes: manual techniques to decrease pain and improve joint/soft-tissue mobility, as well as exercises to increase strength, endurance, and neuromotor control.     Plan:  Continue per POC.    Current Problem:   1. Traumatic complete tear of right rotator cuff, subsequent encounter  Follow Up In Physical Therapy      2. Chronic right shoulder pain            Subjective   Beverly notes that she feels like she is making progress. HEP is going well. Has not been able to complete pulley's at home but they should \"arrive today.\"    Objective   SH PROM (R/L)  Flexion: 110 / 170  IR: 60 / 70  ER: 50 / 80    Treatments:  PT Therapeutic Procedures Time Entry  Manual Therapy Time Entry: 23  Therapeutic Exercise Time Entry: 20    Therapeutic Exercise (90830):  Reviewed HEP:  Therapeutic exercise (14678):  Access Code: [email or web address]  URL: [email or web address]  Date: 03/29/2024  Prepared by: Renny Hinojosa     Exercises  - Circular Shoulder Pendulum with Table Support  - 1-2 x daily - 7 x weekly - 20 reps  - Flexion-Extension Shoulder Pendulum with Table Support  - 1-2 x daily - 7 x weekly - 20 reps  - Seated Shoulder Flexion AAROM with Pulley Behind  - 1-2 x daily - 7 x weekly - 20 reps  - Seated Shoulder Scaption AAROM with Pulley at Side  - 1-2 x daily - 7 x weekly - 20 reps  - Standing " Isometric Shoulder External Rotation with Doorway  - 1-2 x daily - 7 x weekly - 10 reps - 5 hold  - Standing Shoulder Extension with Dowel  - 1-2 x daily - 7 x weekly - 20 reps  - Supine Shoulder Press with Dowel  - 1-2 x daily - 7 x weekly - 20 reps  - Supine Shoulder Flexion Extension AAROM with Dowel  - 1-2 x daily - 7 x weekly - 20 reps  - Supine Shoulder External Rotation with Dowel  - 1-2 x daily - 7 x weekly - 20 reps  - Cervical Retraction with Overpressure  - 8 x daily - 7 x weekly - 10 reps  - Seated Cervical Retraction and Extension  - 8 x daily - 7 x weekly - 10 reps    Wand press -> flexion   Table slides to 110 deg  Pulley's: Flexion and scaption     Manual Therapy (30851):  GHJ: Distraction w/ AP and inferior mobilization: Grade III-IV  GHJ: Lateral distraction   PROM: All directions  TPR: Subscap, UT, pec major, pec minor, lats      Education and discussion on HEP and treatment regarding the benefits related to current condition, POC, pathophysiology, and precautions    *added to HEP

## 2024-04-05 ENCOUNTER — TREATMENT (OUTPATIENT)
Dept: PHYSICAL THERAPY | Facility: CLINIC | Age: 59
End: 2024-04-05
Payer: COMMERCIAL

## 2024-04-05 DIAGNOSIS — S46.011D TRAUMATIC COMPLETE TEAR OF RIGHT ROTATOR CUFF, SUBSEQUENT ENCOUNTER: ICD-10-CM

## 2024-04-05 PROCEDURE — 97140 MANUAL THERAPY 1/> REGIONS: CPT | Mod: GP,CQ

## 2024-04-05 PROCEDURE — 97110 THERAPEUTIC EXERCISES: CPT | Mod: GP,CQ

## 2024-04-05 NOTE — PROGRESS NOTES
Patient Name: Beverly Lucas  MRN: [id number]  Today's Date: 4/5/2024  Time Calculation  Start Time: 1305  Stop Time: 1350  Time Calculation (min): 45 min  Visit #7/20   Subjective:  Pt reports she has not had any pain in her shoulder just in her R bicep. States she has been doing HEP with no increase of pain sx.    Objective:  PROM flexion: 111    Assessment:   Pt tolerated treatment session well. Muscle guarding present prior to treatment that improved with MTT. [employer] more standing AAROM activities this date with appropriate challenge. Pt continues to be compliant with HEP.  Encouraged pt to maintain scapular stabilization and muscle recruitment with active movements.    Plan:   Progress as appropriate per the protocol to allow increased mobility, stability and strength.    Treatment :   Therapeutic Exercise (68850): 15  Pulleys x2 minutes  Finger ladder: flexion/scaption x5  Wall slides: med/lat x10 each way  Scap squeezes x10    Manual Therapy (17366): 30  GHJ: Distraction w/ AP and inferior mobilization: Grade 1-2  GHJ: Lateral distraction   PROM: All directions  STM bicep/deltoids/medial /inferior scapular muscle  TPR: Subscap, UT, pec major, pec minor, lats  Muscle bending pec minor   Rhythmic stabilization  STM to R bicep/Wrist extensors              Current Problem:  1. Traumatic complete tear of right rotator cuff, subsequent encounter  Follow Up In Physical Therapy              Pain:   2/10   Location: referred pain into R bicep        Treatment performed by Fatmata EDMONDSON, under the supervision of Fawn Leigh PTA.

## 2024-04-09 ENCOUNTER — TREATMENT (OUTPATIENT)
Dept: PHYSICAL THERAPY | Facility: CLINIC | Age: 59
End: 2024-04-09
Payer: COMMERCIAL

## 2024-04-09 DIAGNOSIS — S46.011A TRAUMATIC ROTATOR CUFF TEAR, RIGHT, INITIAL ENCOUNTER: ICD-10-CM

## 2024-04-09 DIAGNOSIS — M17.11 PRIMARY OSTEOARTHRITIS OF RIGHT KNEE: ICD-10-CM

## 2024-04-09 DIAGNOSIS — M75.41 IMPINGEMENT SYNDROME OF RIGHT SHOULDER: ICD-10-CM

## 2024-04-09 DIAGNOSIS — M25.511 CHRONIC RIGHT SHOULDER PAIN: Primary | ICD-10-CM

## 2024-04-09 DIAGNOSIS — S46.011D TRAUMATIC COMPLETE TEAR OF RIGHT ROTATOR CUFF, SUBSEQUENT ENCOUNTER: ICD-10-CM

## 2024-04-09 DIAGNOSIS — G89.29 CHRONIC RIGHT SHOULDER PAIN: Primary | ICD-10-CM

## 2024-04-09 PROCEDURE — 97140 MANUAL THERAPY 1/> REGIONS: CPT | Mod: GP | Performed by: GENERAL ACUTE CARE HOSPITAL

## 2024-04-09 PROCEDURE — 97110 THERAPEUTIC EXERCISES: CPT | Mod: GP | Performed by: GENERAL ACUTE CARE HOSPITAL

## 2024-04-09 NOTE — PROGRESS NOTES
Patient Name: Beverly Lucas  MRN: [id number]  Today's Date: 4/9/2024     Current Problem:  1. Chronic right shoulder pain        2. Traumatic rotator cuff tear, right, initial encounter        3. Impingement syndrome of right shoulder        4. Primary osteoarthritis of right knee            Visit 10/20    Subjective:  Change in pain/ pain level: 3/10  Change in function: sleeping in bed   Patient comments: decreased ROM  and increased pain   Right trap, bicep and post deltoid   Objective: finger ladder 23     Treatment:    Therapeutic exercise (21173):    [employer]  Finger ladder  Repeated ext, repeated ER   Manual Therapy (50369):  Glenohumeral mobilization A-P Inferior Lateral grade 3/4 Multiple planes  Stretch elevation, IR, ER, cross body  Scapular mobilization all planes/ manual scapular stabilization  TPR subscapularis, Upper Trapezius, Pectoralis major, Pectoralis minor, latissimus dorsi  Thoracic P-A Rib P-a      Assessment:  Patient notes improved pain levels resultant from activities in therapy session. Improved tissue quality noted as well as body mechanics demonstrated after cueing and corrections. Patient continues to require active therapy to progress functional capabilities with decreasing pain levels and improving mechanics with functional activities including progression of Home exercise program. Tolerance to today's treatment was good. Muscle fatigue noted.  Patient appears motivated and compliant this date, demonstrating a working understanding of principals instructed and home program.    Plan:  Progress with functional strength as tolerated with respect to tissue healing. Manual therapy PRN to improve/maintain ROM, prevent adhesion, reduce pain.

## 2024-04-11 ENCOUNTER — TREATMENT (OUTPATIENT)
Dept: PHYSICAL THERAPY | Facility: CLINIC | Age: 59
End: 2024-04-11
Payer: COMMERCIAL

## 2024-04-11 DIAGNOSIS — M25.511 CHRONIC RIGHT SHOULDER PAIN: Primary | ICD-10-CM

## 2024-04-11 DIAGNOSIS — M17.11 PRIMARY OSTEOARTHRITIS OF RIGHT KNEE: ICD-10-CM

## 2024-04-11 DIAGNOSIS — S46.011D TRAUMATIC COMPLETE TEAR OF RIGHT ROTATOR CUFF, SUBSEQUENT ENCOUNTER: ICD-10-CM

## 2024-04-11 DIAGNOSIS — M75.41 IMPINGEMENT SYNDROME OF RIGHT SHOULDER: ICD-10-CM

## 2024-04-11 DIAGNOSIS — S46.011A TRAUMATIC ROTATOR CUFF TEAR, RIGHT, INITIAL ENCOUNTER: ICD-10-CM

## 2024-04-11 DIAGNOSIS — G89.29 CHRONIC RIGHT SHOULDER PAIN: Primary | ICD-10-CM

## 2024-04-11 PROCEDURE — 97110 THERAPEUTIC EXERCISES: CPT | Mod: GP

## 2024-04-11 PROCEDURE — 97140 MANUAL THERAPY 1/> REGIONS: CPT | Mod: GP

## 2024-04-11 NOTE — PROGRESS NOTES
Physical Therapy Treatment      Patient Name: Beverly Lucas  MRN: [id number]  Today's Date: 4/11/2024  Visit #Visit 11/20   Time Calculation  Start Time: 0912  Stop Time: 0955  Time Calculation (min): 43 min    Assessment:  Beverly continues to respond well to manual. Increased ROM observed throughout today's session - w/ less reported pain. Reviewed HEP and discussed future progressions.     Patient's response to session: Decreased pain and Increased ROM/joint mobility    Patient is likely to benefit from skilled interventions to address their impairments, and treatment that includes: manual techniques to decrease pain and improve joint/soft-tissue mobility, as well as exercises to increase strength, endurance, and neuromotor control.     Plan:  Continue per POC.    Current Problem:   1. Chronic right shoulder pain        2. Traumatic complete tear of right rotator cuff, subsequent encounter  Follow Up In Physical Therapy      3. Traumatic rotator cuff tear, right, initial encounter        4. Impingement syndrome of right shoulder        5. Primary osteoarthritis of right knee            Subjective   Beverly notes improvements in her familiar pain (2/10). Adds that she has been able to continue her HEP w/o any issues.     Objective       Treatments:  PT Therapeutic Procedures Time Entry  Manual Therapy Time Entry: 25  Therapeutic Exercise Time Entry: 18    Therapeutic Exercise (53178):  HEP review  Therapeutic exercise (72133):  Access Code: [email or web address]  URL: [email or web address]  Date: 03/29/2024  Prepared by: Renny Hinojosa     Exercises  - Circular Shoulder Pendulum with Table Support  - 1-2 x daily - 7 x weekly - 20 reps  - Flexion-Extension Shoulder Pendulum with Table Support  - 1-2 x daily - 7 x weekly - 20 reps  - Seated Shoulder Flexion AAROM with Pulley Behind  - 1-2 x daily - 7 x weekly - 20 reps  - Seated Shoulder Scaption AAROM with Pulley at Side  - 1-2 x daily - 7 x weekly - 20 reps  -  Standing Isometric Shoulder External Rotation with Doorway  - 1-2 x daily - 7 x weekly - 10 reps - 5 hold  - Standing Shoulder Extension with Dowel  - 1-2 x daily - 7 x weekly - 20 reps  - Supine Shoulder Press with Dowel  - 1-2 x daily - 7 x weekly - 20 reps  - Supine Shoulder Flexion Extension AAROM with Dowel  - 1-2 x daily - 7 x weekly - 20 reps  - Supine Shoulder External Rotation with Dowel  - 1-2 x daily - 7 x weekly - 20 reps  - Cervical Retraction with Overpressure  - 8 x daily - 7 x weekly - 10 reps  - Seated Cervical Retraction and Extension  - 8 x daily - 7 x weekly - 10 reps     Completed in session  -UBE  -Pulleys  -Finger ladder (24)  -Table slides w/ yoga ball (115 deg)  -Supine SA press up      Manual Therapy (97511):  GHJ: Posterior, lateral: Grade III-IV  PROM: All directions  TRP: Subscap, UT, pectorals, Lats  First-rib mobilization: Grade III-IV    Education and discussion on HEP and treatment regarding the benefits related to current condition, POC, pathophysiology, and precautions    *added to HEP

## 2024-04-15 ENCOUNTER — APPOINTMENT (OUTPATIENT)
Dept: PHYSICAL THERAPY | Facility: CLINIC | Age: 59
End: 2024-04-15
Payer: COMMERCIAL

## 2024-04-23 ENCOUNTER — TREATMENT (OUTPATIENT)
Dept: PHYSICAL THERAPY | Facility: CLINIC | Age: 59
End: 2024-04-23
Payer: COMMERCIAL

## 2024-04-23 DIAGNOSIS — M25.511 CHRONIC RIGHT SHOULDER PAIN: ICD-10-CM

## 2024-04-23 DIAGNOSIS — G89.29 CHRONIC RIGHT SHOULDER PAIN: ICD-10-CM

## 2024-04-23 DIAGNOSIS — M75.41 IMPINGEMENT SYNDROME OF RIGHT SHOULDER: ICD-10-CM

## 2024-04-23 DIAGNOSIS — S46.011D TRAUMATIC COMPLETE TEAR OF RIGHT ROTATOR CUFF, SUBSEQUENT ENCOUNTER: Primary | ICD-10-CM

## 2024-04-23 PROCEDURE — 97110 THERAPEUTIC EXERCISES: CPT | Mod: GP

## 2024-04-23 PROCEDURE — 97140 MANUAL THERAPY 1/> REGIONS: CPT | Mod: GP

## 2024-04-24 DIAGNOSIS — E03.9 HYPOTHYROIDISM, UNSPECIFIED TYPE: ICD-10-CM

## 2024-04-24 RX ORDER — LEVOTHYROXINE SODIUM 100 UG/1
100 TABLET ORAL
Qty: 90 TABLET | Refills: 1 | Status: SHIPPED | OUTPATIENT
Start: 2024-04-24
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
## 2024-04-25 ENCOUNTER — TREATMENT (OUTPATIENT)
Dept: PHYSICAL THERAPY | Facility: CLINIC | Age: 59
End: 2024-04-25
Payer: COMMERCIAL

## 2024-04-25 DIAGNOSIS — S46.011D TRAUMATIC COMPLETE TEAR OF RIGHT ROTATOR CUFF, SUBSEQUENT ENCOUNTER: ICD-10-CM

## 2024-04-25 PROCEDURE — 97110 THERAPEUTIC EXERCISES: CPT | Mod: GP

## 2024-04-25 PROCEDURE — 97140 MANUAL THERAPY 1/> REGIONS: CPT | Mod: GP

## 2024-04-25 NOTE — PROGRESS NOTES
Physical Therapy Treatment      Patient Name: Beverly Lucas  MRN: [id number]  Today's Date: 4/25/2024  Visit #13/20  Time Calculation  Start Time: 0915  Stop Time: 0955  Time Calculation (min): 40 min      Assessment:  Beverly continues to demonstrate increased tolerance to exercise progressions. Mild fatigue observed towards end of session today. Little to no changes in ROM from last session.    Patient's response to session: Decreased pain    Patient is likely to benefit from skilled interventions to address their impairments, and treatment that includes: manual techniques to decrease pain and improve joint/soft-tissue mobility, as well as exercises to increase strength, endurance, and neuromotor control.     Plan:  Continue per POC.    Current Problem:   1. Traumatic complete tear of right rotator cuff, subsequent encounter  Follow Up In Physical Therapy          Subjective   Beverly notes that she is doing well overall. Mild soreness after last session.     Objective   Shoulder ROM (degrees)   Shoulder Flexion R/L: 130/170   Shoulder Internal Rotation R/L: 50/70   Shoulder External Rotation R/L: 60/80    Treatments:  PT Therapeutic Procedures Time Entry  Manual Therapy Time Entry: 15  Therapeutic Exercise Time Entry: 25    Therapeutic Exercise (53546):  HEP review  Therapeutic exercise (57075):  Access Code: [email or web address]  URL: [email or web address]  Date: 03/29/2024  Prepared by: Renny Hinojosa     Exercises  - Circular Shoulder Pendulum with Table Support  - 1-2 x daily - 7 x weekly - 20 reps  - Flexion-Extension Shoulder Pendulum with Table Support  - 1-2 x daily - 7 x weekly - 20 reps  - Seated Shoulder Flexion AAROM with Pulley Behind  - 1-2 x daily - 7 x weekly - 20 reps  - Seated Shoulder Scaption AAROM with Pulley at Side  - 1-2 x daily - 7 x weekly - 20 reps  - Standing Isometric Shoulder External Rotation with Doorway  - 1-2 x daily - 7 x weekly - 10 reps - 5 hold  - Standing Shoulder  Extension with Dowel  - 1-2 x daily - 7 x weekly - 20 reps  - Supine Shoulder Press with Dowel  - 1-2 x daily - 7 x weekly - 20 reps  - Supine Shoulder Flexion Extension AAROM with Dowel  - 1-2 x daily - 7 x weekly - 20 reps  - Supine Shoulder External Rotation with Dowel  - 1-2 x daily - 7 x weekly - 20 reps  - Cervical Retraction with Overpressure  - 8 x daily - 7 x weekly - 10 reps  - Seated Cervical Retraction and Extension  - 8 x daily - 7 x weekly - 10 reps    -UBE: 4'   -Pulleys: Flexion / Scaption / ABD  -Finger ladder -> eccentric wall slides (27)  -Supine wand press -> Flexion: 2lbs  -SL isometric ER w/ 1lbs  -SL shoulder flexion  -Wall slides w/ pillow case  -Supine press+ w/ 1lbs    Manual Therapy (59965):  GHJ: Posterior, lateral, inferior: Grade III-IV  PROM: All directions  STM: Subscapularis, deltoid, biceps  First-rib mobilization: Grade III-IV    Education and discussion on HEP and treatment regarding the benefits related to current condition, POC, pathophysiology, and precautions    *added to HEP

## 2024-05-01 ENCOUNTER — APPOINTMENT (OUTPATIENT)
Dept: ORTHOPEDIC SURGERY | Facility: CLINIC | Age: 59
End: 2024-05-01
Payer: COMMERCIAL

## 2024-05-02 ENCOUNTER — TREATMENT (OUTPATIENT)
Dept: PHYSICAL THERAPY | Facility: CLINIC | Age: 59
End: 2024-05-02
Payer: COMMERCIAL

## 2024-05-02 DIAGNOSIS — S46.011D TRAUMATIC COMPLETE TEAR OF RIGHT ROTATOR CUFF, SUBSEQUENT ENCOUNTER: ICD-10-CM

## 2024-05-02 DIAGNOSIS — M75.41 IMPINGEMENT SYNDROME OF RIGHT SHOULDER: ICD-10-CM

## 2024-05-02 DIAGNOSIS — M25.511 CHRONIC RIGHT SHOULDER PAIN: Primary | ICD-10-CM

## 2024-05-02 DIAGNOSIS — G89.29 CHRONIC RIGHT SHOULDER PAIN: Primary | ICD-10-CM

## 2024-05-02 PROCEDURE — 97140 MANUAL THERAPY 1/> REGIONS: CPT | Mod: GP

## 2024-05-02 PROCEDURE — 97110 THERAPEUTIC EXERCISES: CPT | Mod: GP

## 2024-05-02 NOTE — PROGRESS NOTES
"Physical Therapy Treatment      Patient Name: Beverly Lucas  MRN: [id number]  Today's Date: 5/2/2024  Visit #14/20  Time Calculation  Start Time: 1810  Stop Time: 1850  Time Calculation (min): 40 min      Assessment:  Beverly presents to therapy w/ continued improvements in ROM. She tolerated isometric work today - w/ no changes in her familiar symptoms. Improved tissue quality observed during manual. Progressed HEP to supplement today's session.     Patient's response to session: No change in pain and Increase motor control    Patient is likely to benefit from skilled interventions to address their impairments, and treatment that includes: manual techniques to decrease pain and improve joint/soft-tissue mobility, as well as exercises to increase strength, endurance, and neuromotor control.     Plan:  Continue per POC.    Current Problem:   1. Chronic right shoulder pain        2. Traumatic complete tear of right rotator cuff, subsequent encounter  Follow Up In Physical Therapy      3. Impingement syndrome of right shoulder            Subjective   Beverly notes improvements in pain intensity and ROM. Still notes mild \"clicking\" >100 degrees of flexion and abduction.     Objective   Shoulder ROM (degrees)   Shoulder Flexion R/L: 130/170   Shoulder Internal Rotation R/L: 50/70   Shoulder External Rotation R/L: 60/80    Treatments:  PT Therapeutic Procedures Time Entry  Manual Therapy Time Entry: 15  Therapeutic Exercise Time Entry: 25    Therapeutic Exercise (99650):  HEP review  [facility].[employer]  Access Code: [email or web address]  Prepared by: Dexter Manlet  Wall slides DA -> SA  Reactive IR iso w/ yellow TB  Reactive ER iso w/ yellow TB  Standing rows w/ yellow TB  Reviewed past AA exercises to continue ROM progress    Therapeutic exercise (87565):    UBE: 4' 60s FWD/BKWD  Finger ladder -> eccentric wall slides (29)  Wall slide w/ sheet  ISO ER w/ yellow TB  ISO IR w/ yellow TB  ISO ABD w/ yellow TB   SL Scaption  SL " ISO ER w/ 1lbs  SA press w/ 3lbs    Manual Therapy (85012):  GHJ Posterior glide: Grade II-III: Neutral and 45 ABD  GHJ Inferior glide: Grade II-III: Neutral and 45 ABD  STM: Subscapularis pin and stretch, biceps    Education and discussion on HEP and treatment regarding the benefits related to current condition, POC, pathophysiology, and precautions    *added to HEP

## 2024-05-03 ENCOUNTER — APPOINTMENT (OUTPATIENT)
Dept: PRIMARY CARE | Facility: CLINIC | Age: 59
End: 2024-05-03
Payer: COMMERCIAL

## 2024-05-03 ENCOUNTER — OFFICE VISIT (OUTPATIENT)
Dept: ORTHOPEDIC SURGERY | Facility: CLINIC | Age: 59
End: 2024-05-03
Payer: COMMERCIAL

## 2024-05-03 DIAGNOSIS — S46.011A TRAUMATIC COMPLETE TEAR OF RIGHT ROTATOR CUFF, INITIAL ENCOUNTER: Primary | ICD-10-CM

## 2024-05-03 PROCEDURE — 99024 POSTOP FOLLOW-UP VISIT: CPT | Performed by: ORTHOPAEDIC SURGERY

## 2024-05-03 NOTE — PROGRESS NOTES
History: Beverly is here for her right shoulder.  She is 2 and half months out from arthroscopic rotator cuff repair.  She feels her pain is improving.  She still gets some soreness at night and after certain exercises.  She is here today for follow-up.    Physical Exam: She can reach overhead to 140 degrees with scapular elevation.  She has 5-5 abduction strength but again with scapular elevation.  External rotation is to 40 degrees on the right compared to his 70 degrees on the left.  Elbow and hand are moving well she denies any numbness or tingling distally.    Radiographs: None today    Assessment: Stable right arthroscopic rotator cuff repair, 2-1/2 months out    Plan: She is going to continue with her outpatient therapy.  She can do full passive and active range of motion but will avoid any weight greater than 1 to 2 pounds.  She can progress into therapy on strengthening.  She has gone back to work without any issues.  She can certainly ice or use over-the-counter anti-inflammatories as needed.  We are going to see her back in 6 weeks to assess progress.  If doing well at that point can really selectivity.  All questions were answered with the patient.

## 2024-05-07 ENCOUNTER — TREATMENT (OUTPATIENT)
Dept: PHYSICAL THERAPY | Facility: CLINIC | Age: 59
End: 2024-05-07
Payer: COMMERCIAL

## 2024-05-07 DIAGNOSIS — M75.41 IMPINGEMENT SYNDROME OF RIGHT SHOULDER: Primary | ICD-10-CM

## 2024-05-07 DIAGNOSIS — G89.29 CHRONIC RIGHT SHOULDER PAIN: ICD-10-CM

## 2024-05-07 DIAGNOSIS — M25.511 CHRONIC RIGHT SHOULDER PAIN: ICD-10-CM

## 2024-05-07 DIAGNOSIS — S46.011D TRAUMATIC COMPLETE TEAR OF RIGHT ROTATOR CUFF, SUBSEQUENT ENCOUNTER: ICD-10-CM

## 2024-05-07 PROCEDURE — 97140 MANUAL THERAPY 1/> REGIONS: CPT | Mod: GP

## 2024-05-07 PROCEDURE — 97110 THERAPEUTIC EXERCISES: CPT | Mod: GP

## 2024-05-09 ENCOUNTER — TREATMENT (OUTPATIENT)
Dept: PHYSICAL THERAPY | Facility: CLINIC | Age: 59
End: 2024-05-09
Payer: COMMERCIAL

## 2024-05-09 DIAGNOSIS — S46.011D TRAUMATIC COMPLETE TEAR OF RIGHT ROTATOR CUFF, SUBSEQUENT ENCOUNTER: Primary | ICD-10-CM

## 2024-05-09 DIAGNOSIS — M75.41 IMPINGEMENT SYNDROME OF RIGHT SHOULDER: ICD-10-CM

## 2024-05-09 DIAGNOSIS — G89.29 CHRONIC RIGHT SHOULDER PAIN: ICD-10-CM

## 2024-05-09 DIAGNOSIS — M25.511 CHRONIC RIGHT SHOULDER PAIN: ICD-10-CM

## 2024-05-09 PROCEDURE — 97140 MANUAL THERAPY 1/> REGIONS: CPT | Mod: GP

## 2024-05-09 PROCEDURE — 97110 THERAPEUTIC EXERCISES: CPT | Mod: GP

## 2024-05-09 NOTE — PROGRESS NOTES
Physical Therapy Treatment      Patient Name: Beverly Lucas  MRN: [id number]  Today's Date: 5/9/2024  Visit #16/20  Time Calculation  Start Time: 0840  Stop Time: 0925  Time Calculation (min): 45 min    Assessment:  Beverly continues to tolerate progression of resistance training. Scapular hypomobility observed (especially w/ upward rotation) - improving w/ manual. Reviewed current HEP and discussed safe progressions.      Patient's response to session: Increased ROM/joint mobility    Patient is likely to benefit from skilled interventions to address their impairments, and treatment that includes: manual techniques to decrease pain and improve joint/soft-tissue mobility, as well as exercises to increase strength, endurance, and neuromotor control.     Plan:  Continue per POC.    Current Problem:   1. Traumatic complete tear of right rotator cuff, subsequent encounter  Follow Up In Physical Therapy      2. Impingement syndrome of right shoulder        3. Chronic right shoulder pain            Subjective   Beverly notes that she felt good after last session. Reports 0/10 pain today. HEP going well.     Objective   Shoulder ROM (degrees)   Shoulder Flexion R/L: 140/170   Shoulder Internal Rotation R/L: 50/70   Shoulder External Rotation R/L: 60/80    Treatments:  PT Therapeutic Procedures Time Entry  Manual Therapy Time Entry: 20  Therapeutic Exercise Time Entry: 25    Therapeutic Exercise (18360):  HEP review  [facility].[employer]  Access Code: [email or web address]  Prepared by: Dexter Clements  Wall slides DA -> SA  Reactive IR iso w/ yellow TB  Reactive ER iso w/ yellow TB  Standing rows w/ yellow TB  Reviewed past AA exercises to continue ROM progress    In session:   Pulleys  Finger ladder -> ecc wall slide (30)  SA wall slide w/ sheet  DA wall slide w/ yellow TB  Wand AAROM: Flex/Ext/ABD/ ER  Serratus activation at wall  ISO w/ red TB: ER/IR/ABD/Ext  Supine perturbations at 90 flex    Manual Therapy (68283):  Scapular  mobilizations: Upward rotation  STM: Subscapularis, UT  GHJ: Distraction w/ inferior and posterior glide: Grade II-III      Education and discussion on HEP and treatment regarding the benefits related to current condition, POC, pathophysiology, and precautions    *added to HEP

## 2024-05-13 ENCOUNTER — TREATMENT (OUTPATIENT)
Dept: PHYSICAL THERAPY | Facility: CLINIC | Age: 59
End: 2024-05-13
Payer: COMMERCIAL

## 2024-05-13 DIAGNOSIS — S46.011D TRAUMATIC COMPLETE TEAR OF RIGHT ROTATOR CUFF, SUBSEQUENT ENCOUNTER: ICD-10-CM

## 2024-05-13 PROCEDURE — 97140 MANUAL THERAPY 1/> REGIONS: CPT | Mod: GP | Performed by: GENERAL ACUTE CARE HOSPITAL

## 2024-05-13 PROCEDURE — 97110 THERAPEUTIC EXERCISES: CPT | Mod: GP | Performed by: GENERAL ACUTE CARE HOSPITAL

## 2024-05-13 NOTE — PROGRESS NOTES
Patient Name: Beverly Lucas  MRN: [id number]  Today's Date: 5/13/2024     S/p 2/22/24  11 weeks 4 days post op   Right shoulder arthroscopic rotator cuff repair  Right shoulder arthroscopic subacromial decompression  Right shoulder arthroscopic extensive debridement of glenohumeral joint, labrum, biceps and rotator cuff fraying  Current Problem:  No diagnosis found.    Visit 17/ 20/  Subjective:  Change in pain/ pain level: 0/10      Objective: ER 75 deg after MTT    Treatment:    Therapeutic exercise (96216):    UBE   Pulleys    Manual Therapy (64461):  Glenohumeral mobilization A-P Inferior Lateral grade 3/4 Multiple planes  Stretch elevation, IR, ER, cross body  Scapular mobilization all planes/ manual scapular stabilization  TPR subscapularis, Upper Trapezius, Pectoralis major, Pectoralis minor, latissimus dorsi    Assessment:  Patient notes improved pain levels resultant from activities in therapy session. Improved tissue quality noted as well as body mechanics demonstrated after cueing and corrections. Patient continues to require active therapy to progress functional capabilities with decreasing pain levels and improving mechanics with functional activities including progression of Home exercise program. Tolerance to today's treatment was good. Muscle fatigue noted.  Patient appears motivated and compliant this date, demonstrating a working understanding of principals instructed and home program.    Plan:  Progress with functional strength as tolerated with respect to tissue healing. Manual therapy PRN to improve/maintain ROM, prevent adhesion, reduce pain.

## 2024-05-16 ENCOUNTER — APPOINTMENT (OUTPATIENT)
Dept: RADIOLOGY | Facility: HOSPITAL | Age: 59
DRG: 099 | End: 2024-05-16
Payer: COMMERCIAL

## 2024-05-16 ENCOUNTER — TELEPHONE (OUTPATIENT)
Dept: PRIMARY CARE | Facility: CLINIC | Age: 59
End: 2024-05-16
Payer: COMMERCIAL

## 2024-05-16 ENCOUNTER — APPOINTMENT (OUTPATIENT)
Dept: PHYSICAL THERAPY | Facility: CLINIC | Age: 59
End: 2024-05-16
Payer: COMMERCIAL

## 2024-05-16 ENCOUNTER — HOSPITAL ENCOUNTER (INPATIENT)
Facility: HOSPITAL | Age: 59
LOS: 2 days | Discharge: HOME | DRG: 099 | End: 2024-05-20
Attending: EMERGENCY MEDICINE | Admitting: INTERNAL MEDICINE
Payer: COMMERCIAL

## 2024-05-16 DIAGNOSIS — R20.0 BILATERAL LEG NUMBNESS: Primary | ICD-10-CM

## 2024-05-16 DIAGNOSIS — G62.9 POLYNEUROPATHY: ICD-10-CM

## 2024-05-16 DIAGNOSIS — B00.9 HSV INFECTION: ICD-10-CM

## 2024-05-16 DIAGNOSIS — G70.9 NEUROMUSCULAR DISORDER (MULTI): ICD-10-CM

## 2024-05-16 LAB
ALBUMIN SERPL BCP-MCNC: 4.3 G/DL (ref 3.4–5)
ALP SERPL-CCNC: 57 U/L (ref 33–110)
ALT SERPL W P-5'-P-CCNC: 13 U/L (ref 7–45)
ANION GAP SERPL CALC-SCNC: 10 MMOL/L (ref 10–20)
APPEARANCE CSF: ABNORMAL
APPEARANCE CSF: CLEAR
APPEARANCE UR: CLEAR
AST SERPL W P-5'-P-CCNC: 15 U/L (ref 9–39)
B-HCG SERPL-ACNC: 5 MIU/ML
BASOPHILS # BLD AUTO: 0.02 X10*3/UL (ref 0–0.1)
BASOPHILS NFR BLD AUTO: 0.4 %
BASOPHILS NFR CSF MANUAL: 0 %
BASOPHILS NFR CSF MANUAL: 0 %
BILIRUB SERPL-MCNC: 0.4 MG/DL (ref 0–1.2)
BILIRUB UR STRIP.AUTO-MCNC: NEGATIVE MG/DL
BLASTS CSF MANUAL: 0 %
BLASTS CSF MANUAL: 0 %
BUN SERPL-MCNC: 11 MG/DL (ref 6–23)
CALCIUM SERPL-MCNC: 9.6 MG/DL (ref 8.6–10.3)
CHLORIDE SERPL-SCNC: 105 MMOL/L (ref 98–107)
CO2 SERPL-SCNC: 30 MMOL/L (ref 21–32)
COLOR CSF: ABNORMAL
COLOR CSF: COLORLESS
COLOR SPUN CSF: COLORLESS
COLOR SPUN CSF: COLORLESS
COLOR UR: ABNORMAL
CREAT SERPL-MCNC: 0.66 MG/DL (ref 0.5–1.05)
CRP SERPL-MCNC: <0.1 MG/DL
EGFRCR SERPLBLD CKD-EPI 2021: >90 ML/MIN/1.73M*2
EOSINOPHIL # BLD AUTO: 0.02 X10*3/UL (ref 0–0.7)
EOSINOPHIL NFR BLD AUTO: 0.4 %
EOSINOPHIL NFR CSF MANUAL: 0 %
EOSINOPHIL NFR CSF MANUAL: 0 %
ERYTHROCYTE [DISTWIDTH] IN BLOOD BY AUTOMATED COUNT: 12.5 % (ref 11.5–14.5)
ERYTHROCYTE [SEDIMENTATION RATE] IN BLOOD BY WESTERGREN METHOD: 6 MM/H (ref 0–30)
GLUCOSE CSF-MCNC: 53 MG/DL (ref 40–70)
GLUCOSE SERPL-MCNC: 110 MG/DL (ref 74–99)
GLUCOSE UR STRIP.AUTO-MCNC: NORMAL MG/DL
HCT VFR BLD AUTO: 39 % (ref 36–46)
HGB BLD-MCNC: 13 G/DL (ref 12–16)
IMM GRANULOCYTES # BLD AUTO: 0.01 X10*3/UL (ref 0–0.7)
IMM GRANULOCYTES NFR BLD AUTO: 0.2 % (ref 0–0.9)
IMM GRANULOCYTES NFR CSF: 0 %
IMM GRANULOCYTES NFR CSF: 0 %
INR PPP: 1 (ref 0.9–1.1)
KETONES UR STRIP.AUTO-MCNC: NEGATIVE MG/DL
LEUKOCYTE ESTERASE UR QL STRIP.AUTO: ABNORMAL
LYMPHOCYTES # BLD AUTO: 1.08 X10*3/UL (ref 1.2–4.8)
LYMPHOCYTES NFR BLD AUTO: 21.9 %
LYMPHOCYTES NFR CSF MANUAL: 96 % (ref 28–96)
LYMPHOCYTES NFR CSF MANUAL: 97 % (ref 28–96)
MCH RBC QN AUTO: 29.7 PG (ref 26–34)
MCHC RBC AUTO-ENTMCNC: 33.3 G/DL (ref 32–36)
MCV RBC AUTO: 89 FL (ref 80–100)
MONOCYTES # BLD AUTO: 0.33 X10*3/UL (ref 0.1–1)
MONOCYTES NFR BLD AUTO: 6.7 %
MONOS+MACROS NFR CSF MANUAL: 2 % (ref 16–56)
MONOS+MACROS NFR CSF MANUAL: 3 % (ref 16–56)
NEUTROPHILS # BLD AUTO: 3.48 X10*3/UL (ref 1.2–7.7)
NEUTROPHILS NFR BLD AUTO: 70.4 %
NEUTS SEG NFR CSF MANUAL: 0 % (ref 0–5)
NEUTS SEG NFR CSF MANUAL: 2 % (ref 0–5)
NITRITE UR QL STRIP.AUTO: NEGATIVE
NRBC BLD-RTO: 0 /100 WBCS (ref 0–0)
OTHER CELLS NFR CSF MANUAL: 0 %
OTHER CELLS NFR CSF MANUAL: 0 %
PH UR STRIP.AUTO: 7.5 [PH]
PLASMA CELLS NFR CSF MICRO: 0 %
PLASMA CELLS NFR CSF MICRO: 0 %
PLATELET # BLD AUTO: 181 X10*3/UL (ref 150–450)
POTASSIUM SERPL-SCNC: 3.5 MMOL/L (ref 3.5–5.3)
PROT CSF-MCNC: 41 MG/DL (ref 15–45)
PROT SERPL-MCNC: 6.9 G/DL (ref 6.4–8.2)
PROT UR STRIP.AUTO-MCNC: NEGATIVE MG/DL
PROTHROMBIN TIME: 11.3 SECONDS (ref 9.8–12.8)
RBC # BLD AUTO: 4.37 X10*6/UL (ref 4–5.2)
RBC # CSF AUTO: 28 /UL (ref 0–5)
RBC # CSF AUTO: 311 /UL (ref 0–5)
RBC # UR STRIP.AUTO: NEGATIVE /UL
RBC #/AREA URNS AUTO: NORMAL /HPF
SODIUM SERPL-SCNC: 141 MMOL/L (ref 136–145)
SP GR UR STRIP.AUTO: 1.01
SQUAMOUS #/AREA URNS AUTO: NORMAL /HPF
TOTAL CELLS COUNTED CSF: 100
TOTAL CELLS COUNTED CSF: 100
TUBE # CSF: ABNORMAL
TUBE # CSF: ABNORMAL
UROBILINOGEN UR STRIP.AUTO-MCNC: NORMAL MG/DL
WBC # BLD AUTO: 4.9 X10*3/UL (ref 4.4–11.3)
WBC # CSF AUTO: 46 /UL (ref 1–5)
WBC # CSF AUTO: 56 /UL (ref 1–5)
WBC #/AREA URNS AUTO: NORMAL /HPF

## 2024-05-16 PROCEDURE — 86780 TREPONEMA PALLIDUM: CPT | Mod: STJLAB | Performed by: STUDENT IN AN ORGANIZED HEALTH CARE EDUCATION/TRAINING PROGRAM

## 2024-05-16 PROCEDURE — 84157 ASSAY OF PROTEIN OTHER: CPT | Performed by: EMERGENCY MEDICINE

## 2024-05-16 PROCEDURE — 36415 COLL VENOUS BLD VENIPUNCTURE: CPT | Performed by: EMERGENCY MEDICINE

## 2024-05-16 PROCEDURE — 87070 CULTURE OTHR SPECIMN AEROBIC: CPT | Mod: STJLAB | Performed by: EMERGENCY MEDICINE

## 2024-05-16 PROCEDURE — 009U3ZX DRAINAGE OF SPINAL CANAL, PERCUTANEOUS APPROACH, DIAGNOSTIC: ICD-10-PCS | Performed by: STUDENT IN AN ORGANIZED HEALTH CARE EDUCATION/TRAINING PROGRAM

## 2024-05-16 PROCEDURE — 2500000004 HC RX 250 GENERAL PHARMACY W/ HCPCS (ALT 636 FOR OP/ED): Performed by: STUDENT IN AN ORGANIZED HEALTH CARE EDUCATION/TRAINING PROGRAM

## 2024-05-16 PROCEDURE — 87483 CNS DNA AMP PROBE TYPE 12-25: CPT | Mod: STJLAB | Performed by: INTERNAL MEDICINE

## 2024-05-16 PROCEDURE — 72158 MRI LUMBAR SPINE W/O & W/DYE: CPT

## 2024-05-16 PROCEDURE — 82945 GLUCOSE OTHER FLUID: CPT | Performed by: EMERGENCY MEDICINE

## 2024-05-16 PROCEDURE — 62270 DX LMBR SPI PNXR: CPT | Performed by: STUDENT IN AN ORGANIZED HEALTH CARE EDUCATION/TRAINING PROGRAM

## 2024-05-16 PROCEDURE — 86592 SYPHILIS TEST NON-TREP QUAL: CPT | Mod: STJLAB | Performed by: INTERNAL MEDICINE

## 2024-05-16 PROCEDURE — 85025 COMPLETE CBC W/AUTO DIFF WBC: CPT | Performed by: EMERGENCY MEDICINE

## 2024-05-16 PROCEDURE — 84075 ASSAY ALKALINE PHOSPHATASE: CPT | Performed by: EMERGENCY MEDICINE

## 2024-05-16 PROCEDURE — 84702 CHORIONIC GONADOTROPIN TEST: CPT | Performed by: EMERGENCY MEDICINE

## 2024-05-16 PROCEDURE — A9575 INJ GADOTERATE MEGLUMI 0.1ML: HCPCS | Performed by: EMERGENCY MEDICINE

## 2024-05-16 PROCEDURE — 72157 MRI CHEST SPINE W/O & W/DYE: CPT

## 2024-05-16 PROCEDURE — 89051 BODY FLUID CELL COUNT: CPT | Performed by: EMERGENCY MEDICINE

## 2024-05-16 PROCEDURE — 86140 C-REACTIVE PROTEIN: CPT | Performed by: EMERGENCY MEDICINE

## 2024-05-16 PROCEDURE — 81001 URINALYSIS AUTO W/SCOPE: CPT | Performed by: EMERGENCY MEDICINE

## 2024-05-16 PROCEDURE — 2550000001 HC RX 255 CONTRASTS: Performed by: EMERGENCY MEDICINE

## 2024-05-16 PROCEDURE — 99285 EMERGENCY DEPT VISIT HI MDM: CPT | Mod: 25

## 2024-05-16 PROCEDURE — 96365 THER/PROPH/DIAG IV INF INIT: CPT | Mod: 59

## 2024-05-16 PROCEDURE — 85652 RBC SED RATE AUTOMATED: CPT | Performed by: EMERGENCY MEDICINE

## 2024-05-16 PROCEDURE — 2500000001 HC RX 250 WO HCPCS SELF ADMINISTERED DRUGS (ALT 637 FOR MEDICARE OP): Performed by: EMERGENCY MEDICINE

## 2024-05-16 PROCEDURE — 82040 ASSAY OF SERUM ALBUMIN: CPT | Performed by: EMERGENCY MEDICINE

## 2024-05-16 PROCEDURE — 88104 CYTOPATH FL NONGYN SMEARS: CPT | Performed by: PATHOLOGY

## 2024-05-16 PROCEDURE — 85610 PROTHROMBIN TIME: CPT | Performed by: EMERGENCY MEDICINE

## 2024-05-16 PROCEDURE — 87529 HSV DNA AMP PROBE: CPT | Mod: STJLAB | Performed by: STUDENT IN AN ORGANIZED HEALTH CARE EDUCATION/TRAINING PROGRAM

## 2024-05-16 RX ORDER — SODIUM CHLORIDE 9 MG/ML
75 INJECTION, SOLUTION INTRAVENOUS CONTINUOUS
Status: DISCONTINUED | OUTPATIENT
Start: 2024-05-17 | End: 2024-05-17

## 2024-05-16 RX ORDER — DIAZEPAM 2 MG/1
2 TABLET ORAL ONCE
Status: COMPLETED | OUTPATIENT
Start: 2024-05-16 | End: 2024-05-16

## 2024-05-16 RX ORDER — ACETAMINOPHEN 325 MG/1
650 TABLET ORAL EVERY 4 HOURS PRN
Status: DISCONTINUED | OUTPATIENT
Start: 2024-05-16 | End: 2024-05-20 | Stop reason: HOSPADM

## 2024-05-16 RX ORDER — POLYETHYLENE GLYCOL 3350 17 G/17G
17 POWDER, FOR SOLUTION ORAL DAILY PRN
Status: DISCONTINUED | OUTPATIENT
Start: 2024-05-16 | End: 2024-05-20 | Stop reason: HOSPADM

## 2024-05-16 RX ORDER — ACETAMINOPHEN 325 MG/1
650 TABLET ORAL ONCE
Status: COMPLETED | OUTPATIENT
Start: 2024-05-16 | End: 2024-05-16

## 2024-05-16 RX ORDER — ONDANSETRON HYDROCHLORIDE 2 MG/ML
4 INJECTION, SOLUTION INTRAVENOUS EVERY 8 HOURS PRN
Status: DISCONTINUED | OUTPATIENT
Start: 2024-05-16 | End: 2024-05-20 | Stop reason: HOSPADM

## 2024-05-16 RX ORDER — PANTOPRAZOLE SODIUM 40 MG/1
40 TABLET, DELAYED RELEASE ORAL
Status: DISCONTINUED | OUTPATIENT
Start: 2024-05-17 | End: 2024-05-18

## 2024-05-16 RX ORDER — METOCLOPRAMIDE HYDROCHLORIDE 5 MG/ML
10 INJECTION INTRAMUSCULAR; INTRAVENOUS EVERY 6 HOURS PRN
Status: DISCONTINUED | OUTPATIENT
Start: 2024-05-16 | End: 2024-05-20 | Stop reason: HOSPADM

## 2024-05-16 RX ORDER — ACETAMINOPHEN 160 MG/5ML
650 SOLUTION ORAL EVERY 4 HOURS PRN
Status: DISCONTINUED | OUTPATIENT
Start: 2024-05-16 | End: 2024-05-20 | Stop reason: HOSPADM

## 2024-05-16 RX ORDER — LORAZEPAM 2 MG/ML
0.5 INJECTION INTRAMUSCULAR EVERY 6 HOURS PRN
Status: DISCONTINUED | OUTPATIENT
Start: 2024-05-16 | End: 2024-05-17

## 2024-05-16 RX ORDER — GADOTERATE MEGLUMINE 376.9 MG/ML
13 INJECTION INTRAVENOUS
Status: COMPLETED | OUTPATIENT
Start: 2024-05-16 | End: 2024-05-16

## 2024-05-16 RX ORDER — ONDANSETRON 4 MG/1
4 TABLET, ORALLY DISINTEGRATING ORAL EVERY 8 HOURS PRN
Status: DISCONTINUED | OUTPATIENT
Start: 2024-05-16 | End: 2024-05-20 | Stop reason: HOSPADM

## 2024-05-16 RX ORDER — PANTOPRAZOLE SODIUM 40 MG/10ML
40 INJECTION, POWDER, LYOPHILIZED, FOR SOLUTION INTRAVENOUS
Status: DISCONTINUED | OUTPATIENT
Start: 2024-05-17 | End: 2024-05-18

## 2024-05-16 RX ORDER — TALC
3 POWDER (GRAM) TOPICAL NIGHTLY PRN
Status: DISCONTINUED | OUTPATIENT
Start: 2024-05-16 | End: 2024-05-18

## 2024-05-16 RX ORDER — METOCLOPRAMIDE 10 MG/1
10 TABLET ORAL EVERY 6 HOURS PRN
Status: DISCONTINUED | OUTPATIENT
Start: 2024-05-16 | End: 2024-05-20 | Stop reason: HOSPADM

## 2024-05-16 RX ORDER — ACETAMINOPHEN 650 MG/1
650 SUPPOSITORY RECTAL EVERY 4 HOURS PRN
Status: DISCONTINUED | OUTPATIENT
Start: 2024-05-16 | End: 2024-05-20 | Stop reason: HOSPADM

## 2024-05-16 RX ORDER — ACETAMINOPHEN 325 MG/1
TABLET ORAL
Status: COMPLETED
Start: 2024-05-16 | End: 2024-05-16

## 2024-05-16 RX ADMIN — ACETAMINOPHEN 650 MG: 325 TABLET ORAL at 20:12

## 2024-05-16 RX ADMIN — DIAZEPAM 2 MG: 2 TABLET ORAL at 14:23

## 2024-05-16 RX ADMIN — ACYCLOVIR SODIUM 650 MG: 50 INJECTION, SOLUTION INTRAVENOUS at 22:56

## 2024-05-16 RX ADMIN — GADOTERATE MEGLUMINE 13 ML: 376.9 INJECTION INTRAVENOUS at 16:50

## 2024-05-16 SDOH — SOCIAL STABILITY: SOCIAL INSECURITY: DOES ANYONE TRY TO KEEP YOU FROM HAVING/CONTACTING OTHER FRIENDS OR DOING THINGS OUTSIDE YOUR HOME?: NO

## 2024-05-16 SDOH — SOCIAL STABILITY: SOCIAL INSECURITY: ARE THERE ANY APPARENT SIGNS OF INJURIES/BEHAVIORS THAT COULD BE RELATED TO ABUSE/NEGLECT?: NO

## 2024-05-16 SDOH — SOCIAL STABILITY: SOCIAL INSECURITY: HAS ANYONE EVER THREATENED TO HURT YOUR FAMILY OR YOUR PETS?: NO

## 2024-05-16 SDOH — SOCIAL STABILITY: SOCIAL INSECURITY: HAVE YOU HAD THOUGHTS OF HARMING ANYONE ELSE?: NO

## 2024-05-16 SDOH — SOCIAL STABILITY: SOCIAL INSECURITY: DO YOU FEEL UNSAFE GOING BACK TO THE PLACE WHERE YOU ARE LIVING?: NO

## 2024-05-16 SDOH — SOCIAL STABILITY: SOCIAL INSECURITY: WERE YOU ABLE TO COMPLETE ALL THE BEHAVIORAL HEALTH SCREENINGS?: YES

## 2024-05-16 SDOH — SOCIAL STABILITY: SOCIAL INSECURITY: HAVE YOU HAD ANY THOUGHTS OF HARMING ANYONE ELSE?: NO

## 2024-05-16 SDOH — SOCIAL STABILITY: SOCIAL INSECURITY: DO YOU FEEL ANYONE HAS EXPLOITED OR TAKEN ADVANTAGE OF YOU FINANCIALLY OR OF YOUR PERSONAL PROPERTY?: NO

## 2024-05-16 SDOH — SOCIAL STABILITY: SOCIAL INSECURITY: ABUSE: ADULT

## 2024-05-16 SDOH — SOCIAL STABILITY: SOCIAL INSECURITY: ARE YOU OR HAVE YOU BEEN THREATENED OR ABUSED PHYSICALLY, EMOTIONALLY, OR SEXUALLY BY ANYONE?: NO

## 2024-05-16 ASSESSMENT — LIFESTYLE VARIABLES
HOW OFTEN DO YOU HAVE A DRINK CONTAINING ALCOHOL: 2-4 TIMES A MONTH
HAVE PEOPLE ANNOYED YOU BY CRITICIZING YOUR DRINKING: NO
TOTAL SCORE: 0
AUDIT-C TOTAL SCORE: 2
HOW MANY STANDARD DRINKS CONTAINING ALCOHOL DO YOU HAVE ON A TYPICAL DAY: 1 OR 2
EVER HAD A DRINK FIRST THING IN THE MORNING TO STEADY YOUR NERVES TO GET RID OF A HANGOVER: NO
HOW OFTEN DO YOU HAVE 6 OR MORE DRINKS ON ONE OCCASION: NEVER
EVER FELT BAD OR GUILTY ABOUT YOUR DRINKING: NO
SKIP TO QUESTIONS 9-10: 1
HAVE YOU EVER FELT YOU SHOULD CUT DOWN ON YOUR DRINKING: NO
AUDIT-C TOTAL SCORE: 2

## 2024-05-16 ASSESSMENT — ACTIVITIES OF DAILY LIVING (ADL)
FEEDING YOURSELF: INDEPENDENT
GROOMING: INDEPENDENT
HEARING - RIGHT EAR: FUNCTIONAL
JUDGMENT_ADEQUATE_SAFELY_COMPLETE_DAILY_ACTIVITIES: YES
HEARING - LEFT EAR: FUNCTIONAL
ADEQUATE_TO_COMPLETE_ADL: YES
DRESSING YOURSELF: INDEPENDENT
PATIENT'S MEMORY ADEQUATE TO SAFELY COMPLETE DAILY ACTIVITIES?: YES
TOILETING: INDEPENDENT
WALKS IN HOME: INDEPENDENT
BATHING: INDEPENDENT

## 2024-05-16 ASSESSMENT — COLUMBIA-SUICIDE SEVERITY RATING SCALE - C-SSRS
1. IN THE PAST MONTH, HAVE YOU WISHED YOU WERE DEAD OR WISHED YOU COULD GO TO SLEEP AND NOT WAKE UP?: NO
2. HAVE YOU ACTUALLY HAD ANY THOUGHTS OF KILLING YOURSELF?: NO
6. HAVE YOU EVER DONE ANYTHING, STARTED TO DO ANYTHING, OR PREPARED TO DO ANYTHING TO END YOUR LIFE?: NO

## 2024-05-16 ASSESSMENT — PATIENT HEALTH QUESTIONNAIRE - PHQ9
1. LITTLE INTEREST OR PLEASURE IN DOING THINGS: NOT AT ALL
2. FEELING DOWN, DEPRESSED OR HOPELESS: NOT AT ALL
SUM OF ALL RESPONSES TO PHQ9 QUESTIONS 1 & 2: 0

## 2024-05-16 ASSESSMENT — PAIN - FUNCTIONAL ASSESSMENT
PAIN_FUNCTIONAL_ASSESSMENT: 0-10

## 2024-05-16 ASSESSMENT — PAIN SCALES - GENERAL
PAINLEVEL_OUTOF10: 6
PAINLEVEL_OUTOF10: 0 - NO PAIN

## 2024-05-16 ASSESSMENT — PAIN DESCRIPTION - LOCATION: LOCATION: HEAD

## 2024-05-16 NOTE — TELEPHONE ENCOUNTER
----- Message from Beverly Lucas sent at 5/16/2024 10:51 AM EDT -----  Regarding: Neurologist recommendation  Contact: [phone]  Hi Dr. Malone!! I am having some neurological issues in my lower extremities. Can you please recommend a [facility] neurologist that doesn’t take forever to get into to see? Thank you very much!!! Beverly Rowan

## 2024-05-16 NOTE — ED PROVIDER NOTES
HPI   Chief Complaint   Patient presents with    Numbness     Waist down x 10 days       58-year-old female presenting to the emerged part for evaluation of decreased sensation in the groin, bilateral lower extremities for the past week.  She has a past medical history of hypothyroidism and PCR confirmed genital herpes on May 1.  She denies any vaginal discharge, bleeding.  She states that the initial onset of the herpes infection she had bodyaches, fevers and general malaise but this since resolved.  She endorses increasing sensory loss including saddle anesthesia and sensation of inability to fully evacuate her bladder over the past week.  Not a sending, she states that it has been in the same distribution the entire time but the level of severity of the sensory deficit is progressing.  Saw her gynecologist today who advised her, the emergency room for further evaluation.  She denies no personal or family history of neurologic disease such as MS or rheumatologic disease such as lupus.  She denies any back surgeries, back pain.  She denies any history of cancer or IV drug use.                          Adriana Coma Scale Score: 15                     Patient History   Past Medical History:   Diagnosis Date    Depression     Dorsalgia, unspecified 02/12/2020    Mid back pain    Encounter for other screening for malignant neoplasm of breast 05/19/2021    Breast screening    Fracture of one rib, left side, subsequent encounter for fracture with routine healing 07/23/2018    Closed traumatic displaced fracture of rib of left side with routine healing, subsequent encounter    Hypothyroidism     Nausea 05/24/2019    Nausea in adult    Other injury of unspecified body region, initial encounter 06/21/2021    Bruising    Other malaise 10/22/2020    Malaise and fatigue    Other microscopic hematuria 05/24/2019    Other microscopic hematuria    Pain in thoracic spine 02/24/2020    Thoracic back pain    Personal history of  other drug therapy 10/22/2020    History of influenza vaccination    Personal history of other infectious and parasitic diseases 05/19/2021    History of candidiasis of vagina    Personal history of other specified conditions 06/27/2022    History of dizziness    Personal history of urinary (tract) infections 05/19/2021    History of urinary tract infection    PTSD (post-traumatic stress disorder)     Right lower quadrant pain 11/27/2019    Acute right lower quadrant pain    Stiffness of unspecified hip, not elsewhere classified 02/24/2020    Hip stiffness    Unspecified symptoms and signs involving the genitourinary system 05/19/2021    UTI symptoms     Past Surgical History:   Procedure Laterality Date    FOOT SURGERY      OTHER SURGICAL HISTORY  06/18/2021    Colonoscopy    WISDOM TOOTH EXTRACTION       No family history on file.  Social History     Tobacco Use    Smoking status: Former     Types: Cigarettes    Smokeless tobacco: Never   Substance Use Topics    Alcohol use: Not Currently    Drug use: Never       Physical Exam   ED Triage Vitals [05/16/24 1221]   Temperature Heart Rate Respirations BP   36.6 °C (97.9 °F) 82 18 156/71      Pulse Ox Temp src Heart Rate Source Patient Position   99 % -- Monitor Sitting      BP Location FiO2 (%)     Right arm --       Physical Exam  Vitals and nursing note reviewed.   Constitutional:       General: She is not in acute distress.     Appearance: She is well-developed. She is not ill-appearing.   HENT:      Head: Normocephalic and atraumatic.      Nose: No congestion or rhinorrhea.      Mouth/Throat:      Mouth: Mucous membranes are moist.      Pharynx: No oropharyngeal exudate or posterior oropharyngeal erythema.   Eyes:      Conjunctiva/sclera: Conjunctivae normal.   Cardiovascular:      Rate and Rhythm: Normal rate and regular rhythm.      Pulses: Normal pulses.      Heart sounds: No murmur heard.     No gallop.   Pulmonary:      Effort: Pulmonary effort is normal. No  respiratory distress.      Breath sounds: Normal breath sounds. No stridor. No wheezing, rhonchi or rales.   Abdominal:      General: Bowel sounds are normal. There is no distension.      Palpations: Abdomen is soft.      Tenderness: There is no abdominal tenderness. There is no guarding or rebound.   Genitourinary:     Comments: No genital lesions  Musculoskeletal:         General: No swelling.      Cervical back: Neck supple.      Comments: No back lesions or midline back tenderness.   Skin:     General: Skin is warm and dry.      Capillary Refill: Capillary refill takes less than 2 seconds.      Findings: No rash.   Neurological:      General: No focal deficit present.      Mental Status: She is alert and oriented to person, place, and time.      Cranial Nerves: No cranial nerve deficit.      Sensory: Sensory deficit (Entire circumference of the bilateral lower extremities, groin, inguinal.  Proximally to the level of T11/T12 in the anterior abdomen.  No herpetic lesions.  Motor intact, 3+ patellar reflexes bilateral lower extremities.  No clonus of the ankles.) present.      Gait: Gait normal.      Comments: Strength is 5 out of 5 with flexion extension of the hip, knee, ankle and EHL   Psychiatric:         Mood and Affect: Mood normal.         Behavior: Behavior normal.         Thought Content: Thought content normal.         ED Course & MDM   ED Course as of 05/17/24 1825   Thu May 16, 2024   1402 Patient with a recent diagnosis of HSV.  Coming in with numbness from her waist down.  Concern for space-occupying lesion versus epidural abscess versus transverse myelitis versus Guillain-Barré.  Plan for labs, MRI, and possible LP. [DM]   1506 hCG 5 which I do not believe represents acute pregnancy although I did recommend patient have repeat in the future to confirm.  Urinalysis unremarkable, no sign of elevated inflammatory markers.  PT/INR within normal limits.  Metabolic panel and CBC unremarkable. [TL]   1818  IMPRESSION:  MRI thoracic spine:  1. No abnormal thoracic spinal cord signal.  2. No significant central canal or neural foraminal stenosis in the  thoracic spine.      MRI lumbar spine:  1. No abnormal lumbar spinal cord signal.  2. Multilevel degenerative changes in the lumbar region as above  described worst at L5-S1 where there is moderate right foraminal  stenosis with potential irritation of the exiting right L5 nerve root.       [TL]   1818 L5 nerve root compression would not explain patient's presentation today.  No sign of cord signal of transverse myelitis, cord compression.  Informed by nursing at this time the patient is requesting to leave AGAINST MEDICAL ADVICE.  I did discuss with her at length about her findings.  She requested additional time to make her decision.  I did advise her that her neck step would be a recommendation for lumbar puncture.  She is of clear mind at this time and not intoxicated. [TL]   1818 She requested a glass of water which was provided at this time by provider. [TL]   1906 Reevaluated this time and patient does consent for lumbar puncture and continued diagnostic workup at this time.  Remains neurologically intact. [TL]   2000 Patient tolerated LP without difficulty.  CSF walked down to the lab.  Informed by lab staff that HSV PCR is a send out test. [TL]   2006 650 of Tylenol ordered for patient mild headache at this time. She reports she had mild headache prior to LP. [TL]   2054 CSF protein within normal limits. [TL]   2229 Patient with elevated WBC and RBC count on CSF with lymphocytic predominance.  Does not seem to be bacterial infection in nature..  Discussed patient's case with neurologist Dr. Vazquez who is in agreement this is concerning for potential viral myelitis.  Patient be started on acyclovir IV at this time and will be admitted for further neurology, ID evaluation and monitoring.  She did rest for syphilis screening at this time.  Patient updated and is  agreeable with plan.  All questions answered. [TL]   8080 Shared decision making for disposition  Patient and/or patient´s representative was counseled regarding labs, imaging, likely diagnosis. All questions were answered. Recommendation was made   for Admission given the need for further escalation of care to inpatient management. Patient agreed and was admitted in stable condition. Admitting team was notified of any pending labs or imaging to ensure continuity of care.    [CB]      ED Course User Index  [CB] Cresencio Tsai DO  [DM] Daron Terry MD  [TL] Sudhakar Hu DO         Diagnoses as of 05/17/24 1825   Bilateral leg numbness       Medical Decision Making  58-year-old female presenting to the emergency department for evaluation of bilateral leg decree sensation has been slowly progressive since the past week.  Approximate 2 weeks ago she developed new onset of first episode of genital herpes.  She reports it was confirmed via swab at outside institution by midwife.  She was seen by her gynecologist earlier today and reports that she has inability to feel around her vagina, saddle region.  She denies any back pain, back surgery or trauma.  She has no history of cancer.  Otherwise really healthy.  Does have reported subjective decrease sensation over all dermatomes from approximately T11 slightly above the pubis down.  She has no motor weakness.  Mild hyperreflexia appreciated bilaterally.  No back tenderness or other signs of trauma or infection of the back.  MRI was obtained which did reveal L5 nerve root compression however I do not believe this would cause her obvious presentation.  Based on no sign of abscess, mass or other space-occupying lesion causing spinal cause for the patient's presentation she was consented for lumbar puncture.  Lumbar puncture results pending at time of signout to Dr. Tsai.    Procedure  Lumbar Puncture    Performed by: Sudhakar Hu DO  Authorized by: Kenneth ZHAO  DO Neptali    Consent:     Consent obtained:  Written    Consent given by:  Patient    Risks, benefits, and alternatives were discussed: yes      Risks discussed:  Bleeding, infection, pain, repeat procedure, nerve damage and headache    Alternatives discussed:  No treatment, delayed treatment, alternative treatment, observation and referral  Universal protocol:     Procedure explained and questions answered to patient or proxy's satisfaction: yes      Relevant documents present and verified: yes      Test results available: yes      Imaging studies available: yes      Required blood products, implants, devices, and special equipment available: yes      Immediately prior to procedure a time out was called: yes      Site/side marked: yes      Patient identity confirmed:  Verbally with patient and arm band  Pre-procedure details:     Procedure purpose:  Diagnostic    Preparation: Patient was prepped and draped in usual sterile fashion    Anesthesia:     Anesthesia method:  Local infiltration    Local anesthetic:  Lidocaine 1% WITH epi  Procedure details:     Lumbar space:  L4-L5 interspace    Patient position:  Sitting    Needle gauge:  20    Needle type:  Spinal needle - Quincke tip    Needle length (in):  2.5    Ultrasound guidance: no      Number of attempts:  1    Fluid appearance:  Clear    Tubes of fluid:  4    Total volume (ml):  4  Post-procedure details:     Puncture site:  Adhesive bandage applied and direct pressure applied    Procedure completion:  Tolerated       Sudhakar Hu DO  05/16/24 2200    I saw and evaluated the patient. I personally obtained the key and critical portions of the history and physical exam or was physically present for key and critical portions performed by the resident/fellow/ANN MARIE. I reviewed the resident/fellow/ANN MARIE's documentation and discussed the patient with the resident/fellow/ANN MARIE. I agree with the resident/fellow/ANN MARIE's medical decision making as documented in the  note.    ** Please excuse any errors in grammar or translation related to this dictation. Voice recognition software was utilized to prepare this document. **       Daron Terry MD  [facility]          Daron Terry MD  05/17/24 6698

## 2024-05-17 ENCOUNTER — APPOINTMENT (OUTPATIENT)
Dept: RADIOLOGY | Facility: HOSPITAL | Age: 59
DRG: 099 | End: 2024-05-17
Payer: COMMERCIAL

## 2024-05-17 LAB
ALBUMIN SERPL BCP-MCNC: 3.7 G/DL (ref 3.4–5)
ALP SERPL-CCNC: 45 U/L (ref 33–110)
ALT SERPL W P-5'-P-CCNC: 11 U/L (ref 7–45)
ANION GAP SERPL CALC-SCNC: 9 MMOL/L (ref 10–20)
AST SERPL W P-5'-P-CCNC: 13 U/L (ref 9–39)
BILIRUB SERPL-MCNC: 0.5 MG/DL (ref 0–1.2)
BUN SERPL-MCNC: 11 MG/DL (ref 6–23)
C GATTII+NEOFOR DNA CSF QL NAA+NON-PROBE: NOT DETECTED
CALCIUM SERPL-MCNC: 9 MG/DL (ref 8.6–10.3)
CHLORIDE SERPL-SCNC: 109 MMOL/L (ref 98–107)
CMV DNA CSF QL NAA+NON-PROBE: NOT DETECTED
CO2 SERPL-SCNC: 28 MMOL/L (ref 21–32)
COLOR CSF: COLORLESS
CREAT SERPL-MCNC: 0.6 MG/DL (ref 0.5–1.05)
E COLI K1 DNA CSF QL NAA+NON-PROBE: NOT DETECTED
EGFRCR SERPLBLD CKD-EPI 2021: >90 ML/MIN/1.73M*2
ERYTHROCYTE [DISTWIDTH] IN BLOOD BY AUTOMATED COUNT: 12.6 % (ref 11.5–14.5)
EV RNA CSF QL NAA+NON-PROBE: NOT DETECTED
GLUCOSE SERPL-MCNC: 83 MG/DL (ref 74–99)
GP B STREP DNA CSF QL NAA+NON-PROBE: NOT DETECTED
HAEM INFLU DNA CSF QL NAA+NON-PROBE: NOT DETECTED
HCT VFR BLD AUTO: 37.1 % (ref 36–46)
HGB BLD-MCNC: 12.2 G/DL (ref 12–16)
HHV6 DNA CSF QL NAA+NON-PROBE: NOT DETECTED
HIV 1+2 AB+HIV1 P24 AG SERPL QL IA: NONREACTIVE
HOLD SPECIMEN: NORMAL
HOLD SPECIMEN: NORMAL
HSV1 DNA CSF QL NAA+NON-PROBE: NOT DETECTED
HSV1 DNA CSF QL NAA+PROBE: NOT DETECTED
HSV2 DNA CSF QL NAA+NON-PROBE: NOT DETECTED
HSV2 DNA CSF QL NAA+PROBE: NOT DETECTED
L MONOCYTOG DNA CSF QL NAA+NON-PROBE: NOT DETECTED
MCH RBC QN AUTO: 29.7 PG (ref 26–34)
MCHC RBC AUTO-ENTMCNC: 32.9 G/DL (ref 32–36)
MCV RBC AUTO: 90 FL (ref 80–100)
N MEN DNA CSF QL NAA+NON-PROBE: NOT DETECTED
NRBC BLD-RTO: 0 /100 WBCS (ref 0–0)
PARECHOVIRUS A RNA CSF QL NAA+NON-PROBE: NOT DETECTED
PATH REVIEW-CELL CT,CSF: NORMAL
PLATELET # BLD AUTO: 161 X10*3/UL (ref 150–450)
POTASSIUM SERPL-SCNC: 3.9 MMOL/L (ref 3.5–5.3)
PROT SERPL-MCNC: 5.7 G/DL (ref 6.4–8.2)
RBC # BLD AUTO: 4.11 X10*6/UL (ref 4–5.2)
S PNEUM DNA CSF QL NAA+NON-PROBE: NOT DETECTED
SODIUM SERPL-SCNC: 142 MMOL/L (ref 136–145)
TREPONEMA PALLIDUM IGG+IGM AB [PRESENCE] IN SERUM OR PLASMA BY IMMUNOASSAY: NONREACTIVE
VZV DNA CSF QL NAA+NON-PROBE: NOT DETECTED
WBC # BLD AUTO: 4.7 X10*3/UL (ref 4.4–11.3)

## 2024-05-17 PROCEDURE — 72156 MRI NECK SPINE W/O & W/DYE: CPT | Performed by: RADIOLOGY

## 2024-05-17 PROCEDURE — 85027 COMPLETE CBC AUTOMATED: CPT | Performed by: INTERNAL MEDICINE

## 2024-05-17 PROCEDURE — G0378 HOSPITAL OBSERVATION PER HR: HCPCS

## 2024-05-17 PROCEDURE — 2500000006 HC RX 250 W HCPCS SELF ADMINISTERED DRUGS (ALT 637 FOR ALL PAYERS): Performed by: INTERNAL MEDICINE

## 2024-05-17 PROCEDURE — 70553 MRI BRAIN STEM W/O & W/DYE: CPT | Performed by: RADIOLOGY

## 2024-05-17 PROCEDURE — 2500000001 HC RX 250 WO HCPCS SELF ADMINISTERED DRUGS (ALT 637 FOR MEDICARE OP): Performed by: INTERNAL MEDICINE

## 2024-05-17 PROCEDURE — 36415 COLL VENOUS BLD VENIPUNCTURE: CPT | Performed by: INTERNAL MEDICINE

## 2024-05-17 PROCEDURE — 2500000004 HC RX 250 GENERAL PHARMACY W/ HCPCS (ALT 636 FOR OP/ED): Performed by: INTERNAL MEDICINE

## 2024-05-17 PROCEDURE — 99223 1ST HOSP IP/OBS HIGH 75: CPT

## 2024-05-17 PROCEDURE — 99223 1ST HOSP IP/OBS HIGH 75: CPT | Performed by: INTERNAL MEDICINE

## 2024-05-17 PROCEDURE — A9575 INJ GADOTERATE MEGLUMI 0.1ML: HCPCS | Performed by: INTERNAL MEDICINE

## 2024-05-17 PROCEDURE — 80053 COMPREHEN METABOLIC PANEL: CPT | Performed by: INTERNAL MEDICINE

## 2024-05-17 PROCEDURE — 2550000001 HC RX 255 CONTRASTS: Performed by: INTERNAL MEDICINE

## 2024-05-17 PROCEDURE — 72156 MRI NECK SPINE W/O & W/DYE: CPT

## 2024-05-17 PROCEDURE — 70553 MRI BRAIN STEM W/O & W/DYE: CPT

## 2024-05-17 PROCEDURE — 87389 HIV-1 AG W/HIV-1&-2 AB AG IA: CPT | Mod: STJLAB | Performed by: INTERNAL MEDICINE

## 2024-05-17 RX ORDER — GADOTERATE MEGLUMINE 376.9 MG/ML
12 INJECTION INTRAVENOUS
Status: COMPLETED | OUTPATIENT
Start: 2024-05-17 | End: 2024-05-17

## 2024-05-17 RX ORDER — LEVOTHYROXINE SODIUM 100 UG/1
100 TABLET ORAL
Status: DISCONTINUED | OUTPATIENT
Start: 2024-05-18 | End: 2024-05-17

## 2024-05-17 RX ORDER — ACYCLOVIR 400 MG/1
400 TABLET ORAL EVERY 12 HOURS
COMMUNITY
End: 2024-05-20 | Stop reason: HOSPADM

## 2024-05-17 RX ORDER — DIAZEPAM 5 MG/1
10 TABLET ORAL NIGHTLY PRN
Status: DISCONTINUED | OUTPATIENT
Start: 2024-05-17 | End: 2024-05-17

## 2024-05-17 RX ORDER — SERTRALINE HYDROCHLORIDE 100 MG/1
200 TABLET, FILM COATED ORAL DAILY
Status: DISCONTINUED | OUTPATIENT
Start: 2024-05-17 | End: 2024-05-17

## 2024-05-17 RX ORDER — SERTRALINE HYDROCHLORIDE 100 MG/1
100 TABLET, FILM COATED ORAL 2 TIMES DAILY
Status: DISCONTINUED | OUTPATIENT
Start: 2024-05-17 | End: 2024-05-20 | Stop reason: HOSPADM

## 2024-05-17 RX ORDER — BUSPIRONE HYDROCHLORIDE 10 MG/1
10 TABLET ORAL 3 TIMES DAILY
Status: DISCONTINUED | OUTPATIENT
Start: 2024-05-17 | End: 2024-05-17

## 2024-05-17 RX ORDER — PANTOPRAZOLE SODIUM 40 MG/1
40 TABLET, DELAYED RELEASE ORAL
Status: DISCONTINUED | OUTPATIENT
Start: 2024-05-18 | End: 2024-05-17

## 2024-05-17 RX ORDER — ACYCLOVIR 400 MG/1
400 TABLET ORAL EVERY 12 HOURS
Status: DISCONTINUED | OUTPATIENT
Start: 2024-05-17 | End: 2024-05-17

## 2024-05-17 RX ORDER — LEVOTHYROXINE SODIUM 100 UG/1
100 TABLET ORAL DAILY
Status: DISCONTINUED | OUTPATIENT
Start: 2024-05-17 | End: 2024-05-20 | Stop reason: HOSPADM

## 2024-05-17 RX ORDER — DIAZEPAM 5 MG/1
10 TABLET ORAL ONCE
Status: COMPLETED | OUTPATIENT
Start: 2024-05-17 | End: 2024-05-17

## 2024-05-17 RX ORDER — BUSPIRONE HYDROCHLORIDE 10 MG/1
10 TABLET ORAL 2 TIMES DAILY
Status: DISCONTINUED | OUTPATIENT
Start: 2024-05-17 | End: 2024-05-20 | Stop reason: HOSPADM

## 2024-05-17 RX ORDER — TRAZODONE HYDROCHLORIDE 50 MG/1
50 TABLET ORAL NIGHTLY
Status: DISCONTINUED | OUTPATIENT
Start: 2024-05-17 | End: 2024-05-20 | Stop reason: HOSPADM

## 2024-05-17 RX ADMIN — TRAZODONE HYDROCHLORIDE 50 MG: 50 TABLET ORAL at 01:28

## 2024-05-17 RX ADMIN — DIAZEPAM 10 MG: 5 TABLET ORAL at 01:28

## 2024-05-17 RX ADMIN — LEVOTHYROXINE SODIUM 100 MCG: 0.1 TABLET ORAL at 10:01

## 2024-05-17 RX ADMIN — PANTOPRAZOLE SODIUM 40 MG: 40 TABLET, DELAYED RELEASE ORAL at 06:08

## 2024-05-17 RX ADMIN — SERTRALINE HYDROCHLORIDE 100 MG: 100 TABLET ORAL at 13:44

## 2024-05-17 RX ADMIN — DIAZEPAM 10 MG: 5 TABLET ORAL at 20:00

## 2024-05-17 RX ADMIN — BUSPIRONE HYDROCHLORIDE 10 MG: 10 TABLET ORAL at 13:44

## 2024-05-17 RX ADMIN — GADOTERATE MEGLUMINE 12 ML: 376.9 INJECTION INTRAVENOUS at 22:23

## 2024-05-17 RX ADMIN — ACYCLOVIR SODIUM 590 MG: 50 INJECTION, SOLUTION INTRAVENOUS at 14:43

## 2024-05-17 RX ADMIN — ACYCLOVIR SODIUM 650 MG: 50 INJECTION, SOLUTION INTRAVENOUS at 06:06

## 2024-05-17 RX ADMIN — BUSPIRONE HYDROCHLORIDE 10 MG: 10 TABLET ORAL at 20:53

## 2024-05-17 RX ADMIN — ACETAMINOPHEN 650 MG: 325 TABLET ORAL at 14:43

## 2024-05-17 RX ADMIN — SODIUM CHLORIDE 75 ML/HR: 9 INJECTION, SOLUTION INTRAVENOUS at 01:28

## 2024-05-17 RX ADMIN — ACETAMINOPHEN 650 MG: 325 TABLET ORAL at 09:10

## 2024-05-17 RX ADMIN — TRAZODONE HYDROCHLORIDE 50 MG: 50 TABLET ORAL at 23:27

## 2024-05-17 RX ADMIN — ACYCLOVIR SODIUM 590 MG: 50 INJECTION, SOLUTION INTRAVENOUS at 23:28

## 2024-05-17 RX ADMIN — SERTRALINE HYDROCHLORIDE 100 MG: 100 TABLET ORAL at 20:53

## 2024-05-17 SDOH — SOCIAL STABILITY: SOCIAL INSECURITY: HAVE YOU HAD ANY THOUGHTS OF HARMING ANYONE ELSE?: NO

## 2024-05-17 SDOH — SOCIAL STABILITY: SOCIAL INSECURITY: ARE THERE ANY APPARENT SIGNS OF INJURIES/BEHAVIORS THAT COULD BE RELATED TO ABUSE/NEGLECT?: NO

## 2024-05-17 SDOH — SOCIAL STABILITY: SOCIAL INSECURITY: WERE YOU ABLE TO COMPLETE ALL THE BEHAVIORAL HEALTH SCREENINGS?: YES

## 2024-05-17 SDOH — SOCIAL STABILITY: SOCIAL INSECURITY: ARE YOU OR HAVE YOU BEEN THREATENED OR ABUSED PHYSICALLY, EMOTIONALLY, OR SEXUALLY BY ANYONE?: NO

## 2024-05-17 SDOH — SOCIAL STABILITY: SOCIAL INSECURITY: DOES ANYONE TRY TO KEEP YOU FROM HAVING/CONTACTING OTHER FRIENDS OR DOING THINGS OUTSIDE YOUR HOME?: NO

## 2024-05-17 SDOH — SOCIAL STABILITY: SOCIAL INSECURITY: ABUSE: ADULT

## 2024-05-17 SDOH — SOCIAL STABILITY: SOCIAL INSECURITY: HAS ANYONE EVER THREATENED TO HURT YOUR FAMILY OR YOUR PETS?: NO

## 2024-05-17 SDOH — SOCIAL STABILITY: SOCIAL INSECURITY: DO YOU FEEL ANYONE HAS EXPLOITED OR TAKEN ADVANTAGE OF YOU FINANCIALLY OR OF YOUR PERSONAL PROPERTY?: NO

## 2024-05-17 SDOH — SOCIAL STABILITY: SOCIAL INSECURITY: DO YOU FEEL UNSAFE GOING BACK TO THE PLACE WHERE YOU ARE LIVING?: NO

## 2024-05-17 ASSESSMENT — COGNITIVE AND FUNCTIONAL STATUS - GENERAL
MOBILITY SCORE: 24
MOBILITY SCORE: 24
DAILY ACTIVITIY SCORE: 24
PATIENT BASELINE BEDBOUND: NO
DAILY ACTIVITIY SCORE: 24

## 2024-05-17 ASSESSMENT — COLUMBIA-SUICIDE SEVERITY RATING SCALE - C-SSRS
1. IN THE PAST MONTH, HAVE YOU WISHED YOU WERE DEAD OR WISHED YOU COULD GO TO SLEEP AND NOT WAKE UP?: NO
6. HAVE YOU EVER DONE ANYTHING, STARTED TO DO ANYTHING, OR PREPARED TO DO ANYTHING TO END YOUR LIFE?: NO
2. HAVE YOU ACTUALLY HAD ANY THOUGHTS OF KILLING YOURSELF?: NO

## 2024-05-17 ASSESSMENT — ENCOUNTER SYMPTOMS: HEADACHES: 1

## 2024-05-17 ASSESSMENT — ACTIVITIES OF DAILY LIVING (ADL)
JUDGMENT_ADEQUATE_SAFELY_COMPLETE_DAILY_ACTIVITIES: YES
LACK_OF_TRANSPORTATION: NO
GROOMING: INDEPENDENT
DRESSING YOURSELF: INDEPENDENT
FEEDING YOURSELF: INDEPENDENT
TOILETING: INDEPENDENT
ADEQUATE_TO_COMPLETE_ADL: YES
HEARING - RIGHT EAR: FUNCTIONAL
BATHING: INDEPENDENT
WALKS IN HOME: INDEPENDENT
HEARING - LEFT EAR: FUNCTIONAL
PATIENT'S MEMORY ADEQUATE TO SAFELY COMPLETE DAILY ACTIVITIES?: YES

## 2024-05-17 ASSESSMENT — PAIN - FUNCTIONAL ASSESSMENT
PAIN_FUNCTIONAL_ASSESSMENT: 0-10
PAIN_FUNCTIONAL_ASSESSMENT: 0-10

## 2024-05-17 ASSESSMENT — PAIN SCALES - GENERAL
PAINLEVEL_OUTOF10: 0 - NO PAIN
PAINLEVEL_OUTOF10: 3
PAINLEVEL_OUTOF10: 0 - NO PAIN
PAINLEVEL_OUTOF10: 4
PAINLEVEL_OUTOF10: 2
PAINLEVEL_OUTOF10: 2

## 2024-05-17 ASSESSMENT — PAIN DESCRIPTION - LOCATION
LOCATION: HEAD
LOCATION: HEAD

## 2024-05-17 NOTE — CONSULTS
"NEUROLOGY CONSULTATION NOTE  Subjective   Consult Question: concern for aseptic meningitis   History Of Presenting Illness  Beverly Rowan is a 58 y.o.  female with a PMH hypothyroidism, PTSD presenting BLE numbness and saddle anesthesia.     Patient states that she went to [place] on vacation in April 2024.  When she returned she found a genital ulcer, went to OB/GYN who cultured it and found it was genital herpes.  Patient says this is her first exposure.  She was treated with oral acyclovir.     7 to 10 days ago while still on acyclovir she developed numbness in her pelvic area that she noticed when she was using the bathroom.  She also described numbness in her legs extending to her toes. She denies urinary frequency, incomplete emptying but she does describe new onset constipation. No incontinence. Her symptoms have been stable to worse since it started. She denies leg weakness, any arm involvement.  She denies headaches, vision changes, changes in behavior or mentation. Denies neck stiffness but says that sometimes when she flexes her neck she gets a \"chill\" down her spine.     ROS:  A comprehensive review-of-systems was obtained and negative unless noted above in the HPI.    Past Medical History  Past Medical History:   Diagnosis Date    Depression     Dorsalgia, unspecified 02/12/2020    Mid back pain    Encounter for other screening for malignant neoplasm of breast 05/19/2021    Breast screening    Fracture of one rib, left side, subsequent encounter for fracture with routine healing 07/23/2018    Closed traumatic displaced fracture of rib of left side with routine healing, subsequent encounter    Hypothyroidism     Nausea 05/24/2019    Nausea in adult    Other injury of unspecified body region, initial encounter 06/21/2021    Bruising    Other malaise 10/22/2020    Malaise and fatigue    Other microscopic hematuria 05/24/2019    Other microscopic hematuria    Pain in thoracic spine 02/24/2020    Thoracic " back pain    Personal history of other drug therapy 10/22/2020    History of influenza vaccination    Personal history of other infectious and parasitic diseases 05/19/2021    History of candidiasis of vagina    Personal history of other specified conditions 06/27/2022    History of dizziness    Personal history of urinary (tract) infections 05/19/2021    History of urinary tract infection    PTSD (post-traumatic stress disorder)     Right lower quadrant pain 11/27/2019    Acute right lower quadrant pain    Stiffness of unspecified hip, not elsewhere classified 02/24/2020    Hip stiffness    Unspecified symptoms and signs involving the genitourinary system 05/19/2021    UTI symptoms     Surgical History  Past Surgical History:   Procedure Laterality Date    FOOT SURGERY      OTHER SURGICAL HISTORY  06/18/2021    Colonoscopy    WISDOM TOOTH EXTRACTION       Social History  Social History     Tobacco Use    Smoking status: Former     Types: Cigarettes    Smokeless tobacco: Never   Substance Use Topics    Alcohol use: Not Currently    Drug use: Never     Allergies  Erythromycin, Amoxicillin, and Sulfa (sulfonamide antibiotics)      =========  Medications  Scheduled medications  acyclovir, 10 mg/kg, intravenous, q8h  busPIRone, 10 mg, oral, TID  levothyroxine, 100 mcg, oral, Daily  pantoprazole, 40 mg, oral, Daily before breakfast   Or  pantoprazole, 40 mg, intravenous, Daily before breakfast  [START ON 5/18/2024] pantoprazole, 40 mg, oral, Daily before breakfast  sertraline, 200 mg, oral, Daily  traZODone, 50 mg, oral, Nightly      Continuous medications  sodium chloride 0.9%, 75 mL/hr, Last Rate: 75 mL/hr (05/17/24 1104)      PRN medications  PRN medications: acetaminophen **OR** acetaminophen **OR** acetaminophen, acetaminophen **OR** acetaminophen **OR** acetaminophen, diazePAM, melatonin, metoclopramide **OR** metoclopramide, ondansetron ODT **OR** ondansetron, polyethylene glycol    =========      Objective  "  Vital Signs  /61 (BP Location: Left arm, Patient Position: Lying)   Pulse 73   Temp 36 °C (96.8 °F) (Temporal)   Resp 15   Ht 1.702 m (5' 7\")   Wt 59 kg (130 lb)   SpO2 93%   BMI 20.36 kg/m²     Physical Exam  GENERAL APPEARANCE:  No distress, alert, interactive and cooperative.      MENTAL STATE:   Orientation was normal to time, place and person. Recent and remote memory was intact.  Attention span and concentration were normal. Language testing was normal for comprehension, repetition, expression, and naming. The patient could correctly interpret a picture. General fund of knowledge was intact.     OPHTHALMOSCOPIC:   The ophthalmoscopic exam was deferred.     CRANIAL NERVES:   CN 2   Visual fields full to confrontation.   CN 3, 4, 6   Pupils round, 4 mm in diameter, equally reactive to light. Lids symmetric; no ptosis. EOMs normal alignment, full range with normal saccades, pursuit and convergence.   No nystagmus.   CN 5   Facial sensation intact bilaterally.   CN 7   Normal and symmetric facial strength. Nasolabial folds symmetric.   CN 8   Hearing intact to conversation.  CN 9/10  Palate elevates symmetrically.   CN 11   Normal strength of shoulder shrug and neck turning.   CN 12   Tongue midline, with normal bulk and strength; no fasciculations.     MOTOR:   Muscle bulk and tone were normal in both upper and lower extremities.   No fasciculations, tremor or other abnormal movements were present.   No neck stiffness but an intermittently reproducible \"chill\" sensation when flexing neck                         R          L  Deltoids        5*          5  Biceps          5          5  Triceps          5          5  Wrist Flex      5          5  Wrist Ext       5          5    Pain limited from injury     Hip Flex         5          5  Knee Flex      5          5  Knee Ext        5          5  Dorsiflex         5          5  Plantarflex      5          5    REFLEXES:                       R          " L  BR:               3         3  Biceps:         3          3  Triceps:        3          3  Knee:            3          3  Ankle:          3          3    Babinski: toes downgoing to plantar stimulation. No clonus or other pathologic reflexes present.     SENSORY:   In both upper and lower extremities, sensation was intact to light touch  No sensory level     COORDINATION:    In both upper extremities, finger-nose-finger was intact without dysmetria or overshoot.   In both lower extremities, heel-to-shin was intact.      GAIT:   Station was stable with a normal base. Gait was stable with a normal arm swing and speed. No ataxia, shuffling, steppage or waddling was present. No circumduction was present. Tandem gait was intact. No Romberg sign was present.      Recent/Relevant Labs:  Results from last 72 hours   Lab Units 05/17/24  0529 05/16/24  1423   WBC AUTO x10*3/uL 4.7 4.9   HEMOGLOBIN g/dL 12.2 13.0   HEMATOCRIT % 37.1 39.0   PLATELETS AUTO x10*3/uL 161 181        Results from last 72 hours   Lab Units 05/17/24  0529 05/16/24  1423   SODIUM mmol/L 142 141   POTASSIUM mmol/L 3.9 3.5   CHLORIDE mmol/L 109* 105   CO2 mmol/L 28 30   BUN mg/dL 11 11   CREATININE mg/dL 0.60 0.66            Recent/Relevant Imaging:  No MRI head results found for the past 14 days  No CT head results found for the past 14 days    Other Recent/Relevant Studies:  No echocardiogram results found for the past 14 days    =========  Assessment/Plan   Assessment:  Beverly Rowan is a 58 y.o.  female with a PMH hypothyroidism, PTSD presenting BLE numbness and saddle anesthesia following a genital herpes infection.    Patient developed new onset genital herpes infection in April for which she was started on acyclovir, 2 weeks later developed bilateral lower extremity numbness and saddle anesthesia.  On exam patient is awake and conversational, no focal weakness, no sensory level, possible meningismus, diffuse hyperreflexia.  CSF studies  reveal a lymphocytic pleocytosis.  HSV PCR in the CSF is negative.  MRI thoracic and lumbar spine with without contrast does not show any signs of myelitis.    Suspect that patient has an aseptic meningitis and/or myelitis likely secondary to genital herpes infection.  It is hard to interpret the negative HSV PCR in the CSF because she has been treated for almost 3 weeks with acyclovir.  She may also have another viral etiology, pending bio fire.  We also discussed that this might be an autoimmune etiology that was triggered by a viral infection.  We will want to look at cervical spine imaging and brain imaging.    Recommendations:  - MRI brain w/wo contrast  - MRI c-spine w/wo contrast   - follow up CSF studies   - continue IV acyclovir   - ID consult     Seen with attending Dr. Vazquez.    Vickie Knutson MD  Neurology PGY-2

## 2024-05-17 NOTE — PROGRESS NOTES
Emergency Medicine Transition of Care Note.    I received Beverly Rowan in signout from Dr. Hu.  Please see the previous ED provider note for all HPI, PE and MDM up to the time of signout at 2215. This is in addition to the primary record.    In brief Beverly Rowan is an 58 y.o. female presenting for   Chief Complaint   Patient presents with    Numbness     Waist down x 10 days     At the time of signout we were awaiting: Neuro consult for recs and dispo    ED Course as of 05/16/24 2251   Thu May 16, 2024   1402 Patient with a recent diagnosis of HSV.  Coming in with numbness from her waist down.  Concern for space-occupying lesion versus epidural abscess versus transverse myelitis versus Guillain-Barré.  Plan for labs, MRI, and possible LP. [DM]   1506 hCG 5 which I do not believe represents acute pregnancy although I did recommend patient have repeat in the future to confirm.  Urinalysis unremarkable, no sign of elevated inflammatory markers.  PT/INR within normal limits.  Metabolic panel and CBC unremarkable. [TL]   1818 IMPRESSION:  MRI thoracic spine:  1. No abnormal thoracic spinal cord signal.  2. No significant central canal or neural foraminal stenosis in the  thoracic spine.      MRI lumbar spine:  1. No abnormal lumbar spinal cord signal.  2. Multilevel degenerative changes in the lumbar region as above  described worst at L5-S1 where there is moderate right foraminal  stenosis with potential irritation of the exiting right L5 nerve root.       [TL]   1818 L5 nerve root compression would not explain patient's presentation today.  No sign of cord signal of transverse myelitis, cord compression.  Informed by nursing at this time the patient is requesting to leave AGAINST MEDICAL ADVICE.  I did discuss with her at length about her findings.  She requested additional time to make her decision.  I did advise her that her neck step would be a recommendation for lumbar puncture.  She is of clear mind at  this time and not intoxicated. [TL]   1818 She requested a glass of water which was provided at this time by provider. [TL]   1906 Reevaluated this time and patient does consent for lumbar puncture and continued diagnostic workup at this time.  Remains neurologically intact. [TL]   2000 Patient tolerated LP without difficulty.  CSF walked down to the lab.  Informed by lab staff that HSV PCR is a send out test. [TL]   2006 650 of Tylenol ordered for patient mild headache at this time. She reports she had mild headache prior to LP. [TL]   2054 CSF protein within normal limits. [TL]   2229 Patient with elevated WBC and RBC count on CSF with lymphocytic predominance.  Does not seem to be bacterial infection in nature..  Discussed patient's case with neurologist Dr. Vazquez who is in agreement this is concerning for potential viral myelitis.  Patient be started on acyclovir IV at this time and will be admitted for further neurology, ID evaluation and monitoring.  She did rest for syphilis screening at this time.  Patient updated and is agreeable with plan.  All questions answered. [TL]   2251 Shared decision making for disposition  Patient and/or patient´s representative was counseled regarding labs, imaging, likely diagnosis. All questions were answered. Recommendation was made   for Admission given the need for further escalation of care to inpatient management. Patient agreed and was admitted in stable condition. Admitting team was notified of any pending labs or imaging to ensure continuity of care.    [CB]      ED Course User Index  [CB] Cresencio Tsai DO  [DM] Daron Terry MD  [TL] Sudhakar Hu DO         Diagnoses as of 05/16/24 2251   Bilateral leg numbness       Medical Decision Making      Final diagnoses:   [R20.0] Bilateral leg numbness           Procedure  Procedures    Cresencio Tsai DO     Reviewed and approved by CRESENCIO TSAI on 5/16/24 at 10:14 PM.    I saw and evaluated the patient. I  personally obtained the key and critical portions of the history and physical exam or was physically present for key and critical portions performed by the resident/fellow/ANN MARIE. I reviewed the resident/fellow/ANN MARIE's documentation and discussed the patient with the resident/fellow/ANN MARIE. I agree with the resident/fellow/ANN MARIE's medical decision making as documented in the note.    ** Please excuse any errors in grammar or translation related to this dictation. Voice recognition software was utilized to prepare this document. **       Daron Terry MD  [facility]

## 2024-05-17 NOTE — PROGRESS NOTES
Spiritual Care Visit    Clinical Encounter Type  Visited With: Patient  Routine Visit: Introduction  Continue Visiting: No                                            Taxonomy  Intended Effects: Preserve dignity and respect, Alyssa affirmation, Helping someone feel comforted, Promote sense of peace  Methods: Offer spiritual/Confucianism support  Interventions: Share words of hope and inspiration, [place]    Patient shared her troubles.  Patient talked about her family and her job.   listened carefully and showed empathy and prayed at her request.

## 2024-05-17 NOTE — H&P
History Of Present Illness  Beverly Rowan is a 58 y.o. female presenting with chief complaints of decreased sensation in the groin, bilateral lower extremity weakness all which have been ongoing for approximately 1 week.  Her past medical history only significant for hypothyroidism.  She states that she was recently diagnosed with genital herpes on May 1.  At the initial onset of symptoms she endorsed body aches, fever, general malaise.  However since that time she has appreciated worsening sensory deficits/numbness of her pelvic region. She denies bladder/bowel incontinence.   Patient was evaluated by her gynecologist today who recommended prompt evaluation in the emergency department.  MRI of the thoracic spine was obtained in addition to the lumbar spine.  No abnormalities on thoracic spine.  Multilevel degenerative changes noted in the lumbar region, worst at L5/S1 where there is moderate right foraminal stenosis and potential irritation of the exiting right L5 nerve root.  Patient did ultimately consent to lumbar puncture which was obtained.  HSV PCR currently pending.  WBC and RBC count on CSF analysis noted to be elevated with lymphocytic predominance.  Findings concerning for viral meningitis in the setting of recent HSV infection.  Started on IV acyclovir.  Admitted for further management.  She denies fever, chills, chest pain, shortness of breath, abdominal pain, nausea, vomiting or diarrhea at this time.    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  Started on acyclovir TID, finished 10 day course  Noticed flu like symptoms  5/9 developed numbness pelvic region  Able to exercise without difficulty, able to urinate/BM, no incontinence issues      Past Medical History  Past Medical History:   Diagnosis Date    Depression     Dorsalgia, unspecified 02/12/2020    Mid back pain    Encounter for other screening for malignant neoplasm of  breast 05/19/2021    Breast screening    Fracture of one rib, left side, subsequent encounter for fracture with routine healing 07/23/2018    Closed traumatic displaced fracture of rib of left side with routine healing, subsequent encounter    Hypothyroidism     Nausea 05/24/2019    Nausea in adult    Other injury of unspecified body region, initial encounter 06/21/2021    Bruising    Other malaise 10/22/2020    Malaise and fatigue    Other microscopic hematuria 05/24/2019    Other microscopic hematuria    Pain in thoracic spine 02/24/2020    Thoracic back pain    Personal history of other drug therapy 10/22/2020    History of influenza vaccination    Personal history of other infectious and parasitic diseases 05/19/2021    History of candidiasis of vagina    Personal history of other specified conditions 06/27/2022    History of dizziness    Personal history of urinary (tract) infections 05/19/2021    History of urinary tract infection    PTSD (post-traumatic stress disorder)     Right lower quadrant pain 11/27/2019    Acute right lower quadrant pain    Stiffness of unspecified hip, not elsewhere classified 02/24/2020    Hip stiffness    Unspecified symptoms and signs involving the genitourinary system 05/19/2021    UTI symptoms       Surgical History  Past Surgical History:   Procedure Laterality Date    FOOT SURGERY      OTHER SURGICAL HISTORY  06/18/2021    Colonoscopy    WISDOM TOOTH EXTRACTION          Social History  She reports that she has quit smoking. Her smoking use included cigarettes. She has never used smokeless tobacco. She reports that she does not currently use alcohol. She reports that she does not use drugs.    Family History  No family history on file.     Allergies  Erythromycin, Amoxicillin, and Sulfa (sulfonamide antibiotics)    Review of Systems   Neurological:  Positive for headaches.        Physical Exam  Vitals reviewed.   Constitutional:       Appearance: Normal appearance.   HENT:       "Head: Normocephalic and atraumatic.      Nose: Nose normal.      Mouth/Throat:      Mouth: Mucous membranes are moist.   Eyes:      Extraocular Movements: Extraocular movements intact.      Conjunctiva/sclera: Conjunctivae normal.      Pupils: Pupils are equal, round, and reactive to light.   Cardiovascular:      Rate and Rhythm: Normal rate and regular rhythm.      Pulses: Normal pulses.      Heart sounds: Normal heart sounds.   Pulmonary:      Effort: Pulmonary effort is normal.      Breath sounds: Normal breath sounds.   Abdominal:      General: Bowel sounds are normal.      Palpations: Abdomen is soft.   Musculoskeletal:         General: Normal range of motion.      Cervical back: Normal range of motion and neck supple.   Skin:     General: Skin is warm and dry.   Neurological:      Mental Status: She is alert.      Comments: + pelvic numbness bilaterally  Strength bilateral LE, sensation intact, able to walk   Psychiatric:         Mood and Affect: Mood normal.         Behavior: Behavior normal.          Last Recorded Vitals  Blood pressure 150/78, pulse 60, temperature 36.8 °C (98.2 °F), temperature source Temporal, resp. rate 18, height 1.702 m (5' 7\"), weight 59 kg (130 lb), SpO2 99%.    Relevant Results  Scheduled medications  acyclovir, 10 mg/kg, intravenous, q8h  pantoprazole, 40 mg, oral, Daily before breakfast   Or  pantoprazole, 40 mg, intravenous, Daily before breakfast  traZODone, 50 mg, oral, Nightly      Continuous medications  sodium chloride 0.9%, 75 mL/hr, Last Rate: 75 mL/hr (05/17/24 0128)      PRN medications  PRN medications: acetaminophen **OR** acetaminophen **OR** acetaminophen, acetaminophen **OR** acetaminophen **OR** acetaminophen, diazePAM, melatonin, metoclopramide **OR** metoclopramide, ondansetron ODT **OR** ondansetron, polyethylene glycol  MR thoracic spine w and wo IV contrast    Result Date: 5/16/2024  Interpreted By:  Pascual Hart and Sheng Max STUDY: MR THORACIC SPINE W AND WO " IV CONTRAST; MR LUMBAR SPINE W AND WO IV CONTRAST;  5/16/2024 4:50 pm   INDICATION: Signs/Symptoms:groin and bilateral leg numbnes starting 1 week after genital herpes, motor intact, 3+ reflexes; Signs/Symptoms:groin and bilateral leg numbnes starting 1 week after genital herpes, motor intact, 3+ reflexe.   Per EMR: 58-year-old female history of genital HSV density due to decreased sensation in the groin and bilateral lower extremities of the past week. MRI thoracic and lumbar spine to evaluate for space-occupying lesion versus epidural abscess versus transverse myelitis versus GBS   COMPARISON: None.   ACCESSION NUMBER(S): [id number]; [id number]   ORDERING CLINICIAN: CHELY MICHAELS   TECHNIQUE: Sagittal T1, T2, STIR and axial T2 and T1 weighted MR images of the thoracic and lumbar spine were obtained.   FINDINGS: MRI thoracic spine:   Alignment: Within normal limits.   Vertebrae/Intervertebral Discs: The vertebral body heights are intact.  Marrow signal is within normal limits. No increased signal on STIR sequences to suggest significant bone marrow edema. There is disc height loss most prominent at levels T5-T6, T6-T7, T7-T8 and T8-T9. There is decreased disc signal within the nucleus pulposus throughout all thoracic levels most prominent at levels T2-T3, T3-T4, T4-T5, T5-T6, T6-T7, T7-T8 and T8-T9.   Spinal cord: Preserved in signal.   T1-2: There is no significant central canal or neural foraminal stenosis. T2-3:  There is no significant central canal or neural foraminal stenosis. T3-4: There is no significant central canal or neural foraminal stenosis. T4-5: There is no significant central canal or neural foraminal stenosis. T5-6: There is no significant central canal or neural foraminal stenosis. T6-7: There is no significant central canal or neural foraminal stenosis. T7-8: There is no significant central canal or neural foraminal stenosis. T8-9: There is no significant central canal or neural foraminal  stenosis. T9-10: There is no significant central canal or neural foraminal stenosis. T10-11: There is no significant central canal or neural foraminal stenosis. T11-12: There is no significant central canal or neural foraminal stenosis.   Paraspinous Soft Tissues: Within normal limits.     MRI lumbar spine:   Alignment: The vertebral alignment is maintained.   Vertebrae/Intervertebral Discs: The vertebral bodies demonstrate expected height. Increased signal on STIR sequences to suggest significant bone marrow edema. There is a T1 and T2 hyperintense 1.2 cm lesion the level of L4 likely representing a typical vertebral hemangioma versus focal fatty infiltration. There is disc height loss most prominent at levels L4-L5 and L5-S1. There is decreased disc signal within the nucleus pulposus throughout all lumbar vertebral levels.   Conus: The lower thoracic cord appears unremarkable. The conus terminates at superior endplate of L2. No clear pathologic enhancement was identified in the lumbar spinal canal.   T12-L1:  There is no significant central canal or neural foraminal stenosis.   L1-2:  There is no significant central canal or neural foraminal stenosis.   L2-3: There is disc bulge and bilateral facet joint hypertrophy without significant central canal or neural foraminal stenosis.   L3-4: There is disc bulge, bilateral ligamentum flavum thickening and bilateral facet joint hypertrophy with facet joint edema resulting partial effacement of the bilateral lateral recesses. Constellation of findings results in moderate central canal stenosis and minimal to moderate left neural foraminal stenosis without clear compression of the exiting left L3 nerve root.   L4-5: There is disc bulge, bilateral ligamentum flavum thickening and bilateral facet joint hypertrophy partially effacing the bilateral lateral recesses. Constellation of findings results in mild central canal stenosis, and mild right neural foraminal stenosis .  There are bilateral nonobstructing synovial cysts.   L5-S1: There is disc bulge, bilateral ligamentum flavum thickening resulting in mild left and moderate right neural foraminal stenosis abutting and possibly irritating the exiting right L5 nerve root. No high-grade central canal stenosis.       MRI thoracic spine: 1. No abnormal thoracic spinal cord signal. 2. No significant central canal or neural foraminal stenosis in the thoracic spine.   MRI lumbar spine: 1. No abnormal lumbar spinal cord signal. 2. Multilevel degenerative changes in the lumbar region as above described worst at L5-S1 where there is moderate right foraminal stenosis with potential irritation of the exiting right L5 nerve root.   I personally reviewed the images/study and I agree with the findings as stated by Dr. Layton Corbett. This study was interpreted at [place], [place].   MACRO: None   Signed by: Pascual Hart 5/16/2024 6:08 PM Dictation workstation:   [id number]    MR lumbar spine w and wo IV contrast    Result Date: 5/16/2024  Interpreted By:  Pascual Hart,  and Aric Traylor STUDY: MR THORACIC SPINE W AND WO IV CONTRAST; MR LUMBAR SPINE W AND WO IV CONTRAST;  5/16/2024 4:50 pm   INDICATION: Signs/Symptoms:groin and bilateral leg numbnes starting 1 week after genital herpes, motor intact, 3+ reflexes; Signs/Symptoms:groin and bilateral leg numbnes starting 1 week after genital herpes, motor intact, 3+ reflexe.   Per EMR: 58-year-old female history of genital HSV density due to decreased sensation in the groin and bilateral lower extremities of the past week. MRI thoracic and lumbar spine to evaluate for space-occupying lesion versus epidural abscess versus transverse myelitis versus GBS   COMPARISON: None.   ACCESSION NUMBER(S): [id number]; [id number]   ORDERING CLINICIAN: CHELY MICHAELS   TECHNIQUE: Sagittal T1, T2, STIR and axial T2 and T1 weighted MR images of the thoracic and lumbar spine were  obtained.   FINDINGS: MRI thoracic spine:   Alignment: Within normal limits.   Vertebrae/Intervertebral Discs: The vertebral body heights are intact.  Marrow signal is within normal limits. No increased signal on STIR sequences to suggest significant bone marrow edema. There is disc height loss most prominent at levels T5-T6, T6-T7, T7-T8 and T8-T9. There is decreased disc signal within the nucleus pulposus throughout all thoracic levels most prominent at levels T2-T3, T3-T4, T4-T5, T5-T6, T6-T7, T7-T8 and T8-T9.   Spinal cord: Preserved in signal.   T1-2: There is no significant central canal or neural foraminal stenosis. T2-3:  There is no significant central canal or neural foraminal stenosis. T3-4: There is no significant central canal or neural foraminal stenosis. T4-5: There is no significant central canal or neural foraminal stenosis. T5-6: There is no significant central canal or neural foraminal stenosis. T6-7: There is no significant central canal or neural foraminal stenosis. T7-8: There is no significant central canal or neural foraminal stenosis. T8-9: There is no significant central canal or neural foraminal stenosis. T9-10: There is no significant central canal or neural foraminal stenosis. T10-11: There is no significant central canal or neural foraminal stenosis. T11-12: There is no significant central canal or neural foraminal stenosis.   Paraspinous Soft Tissues: Within normal limits.     MRI lumbar spine:   Alignment: The vertebral alignment is maintained.   Vertebrae/Intervertebral Discs: The vertebral bodies demonstrate expected height. Increased signal on STIR sequences to suggest significant bone marrow edema. There is a T1 and T2 hyperintense 1.2 cm lesion the level of L4 likely representing a typical vertebral hemangioma versus focal fatty infiltration. There is disc height loss most prominent at levels L4-L5 and L5-S1. There is decreased disc signal within the nucleus pulposus throughout  all lumbar vertebral levels.   Conus: The lower thoracic cord appears unremarkable. The conus terminates at superior endplate of L2. No clear pathologic enhancement was identified in the lumbar spinal canal.   T12-L1:  There is no significant central canal or neural foraminal stenosis.   L1-2:  There is no significant central canal or neural foraminal stenosis.   L2-3: There is disc bulge and bilateral facet joint hypertrophy without significant central canal or neural foraminal stenosis.   L3-4: There is disc bulge, bilateral ligamentum flavum thickening and bilateral facet joint hypertrophy with facet joint edema resulting partial effacement of the bilateral lateral recesses. Constellation of findings results in moderate central canal stenosis and minimal to moderate left neural foraminal stenosis without clear compression of the exiting left L3 nerve root.   L4-5: There is disc bulge, bilateral ligamentum flavum thickening and bilateral facet joint hypertrophy partially effacing the bilateral lateral recesses. Constellation of findings results in mild central canal stenosis, and mild right neural foraminal stenosis . There are bilateral nonobstructing synovial cysts.   L5-S1: There is disc bulge, bilateral ligamentum flavum thickening resulting in mild left and moderate right neural foraminal stenosis abutting and possibly irritating the exiting right L5 nerve root. No high-grade central canal stenosis.       MRI thoracic spine: 1. No abnormal thoracic spinal cord signal. 2. No significant central canal or neural foraminal stenosis in the thoracic spine.   MRI lumbar spine: 1. No abnormal lumbar spinal cord signal. 2. Multilevel degenerative changes in the lumbar region as above described worst at L5-S1 where there is moderate right foraminal stenosis with potential irritation of the exiting right L5 nerve root.   I personally reviewed the images/study and I agree with the findings as stated by Dr. Layton Corbett.  This study was interpreted at [place], [place].   MACRO: None   Signed by: Pascual Hart 5/16/2024 6:08 PM Dictation workstation:   [id number]   Results for orders placed or performed during the hospital encounter of 05/16/24 (from the past 24 hour(s))   CBC and Auto Differential   Result Value Ref Range    WBC 4.9 4.4 - 11.3 x10*3/uL    nRBC 0.0 0.0 - 0.0 /100 WBCs    RBC 4.37 4.00 - 5.20 x10*6/uL    Hemoglobin 13.0 12.0 - 16.0 g/dL    Hematocrit 39.0 36.0 - 46.0 %    MCV 89 80 - 100 fL    MCH 29.7 26.0 - 34.0 pg    MCHC 33.3 32.0 - 36.0 g/dL    RDW 12.5 11.5 - 14.5 %    Platelets 181 150 - 450 x10*3/uL    Neutrophils % 70.4 40.0 - 80.0 %    Immature Granulocytes %, Automated 0.2 0.0 - 0.9 %    Lymphocytes % 21.9 13.0 - 44.0 %    Monocytes % 6.7 2.0 - 10.0 %    Eosinophils % 0.4 0.0 - 6.0 %    Basophils % 0.4 0.0 - 2.0 %    Neutrophils Absolute 3.48 1.20 - 7.70 x10*3/uL    Immature Granulocytes Absolute, Automated 0.01 0.00 - 0.70 x10*3/uL    Lymphocytes Absolute 1.08 (L) 1.20 - 4.80 x10*3/uL    Monocytes Absolute 0.33 0.10 - 1.00 x10*3/uL    Eosinophils Absolute 0.02 0.00 - 0.70 x10*3/uL    Basophils Absolute 0.02 0.00 - 0.10 x10*3/uL   Comprehensive metabolic panel   Result Value Ref Range    Glucose 110 (H) 74 - 99 mg/dL    Sodium 141 136 - 145 mmol/L    Potassium 3.5 3.5 - 5.3 mmol/L    Chloride 105 98 - 107 mmol/L    Bicarbonate 30 21 - 32 mmol/L    Anion Gap 10 10 - 20 mmol/L    Urea Nitrogen 11 6 - 23 mg/dL    Creatinine 0.66 0.50 - 1.05 mg/dL    eGFR >90 >60 mL/min/1.73m*2    Calcium 9.6 8.6 - 10.3 mg/dL    Albumin 4.3 3.4 - 5.0 g/dL    Alkaline Phosphatase 57 33 - 110 U/L    Total Protein 6.9 6.4 - 8.2 g/dL    AST 15 9 - 39 U/L    Bilirubin, Total 0.4 0.0 - 1.2 mg/dL    ALT 13 7 - 45 U/L   hCG, quantitative, pregnancy   Result Value Ref Range    HCG, Beta-Quantitative 5 (H) <5 mIU/mL   Urinalysis with Reflex Microscopic   Result Value Ref Range    Color, Urine  Light-Yellow Light-Yellow, Yellow, Dark-Yellow    Appearance, Urine Clear Clear    Specific Gravity, Urine 1.007 1.005 - 1.035    pH, Urine 7.5 5.0, 5.5, 6.0, 6.5, 7.0, 7.5, 8.0    Protein, Urine NEGATIVE NEGATIVE, 10 (TRACE), 20 (TRACE) mg/dL    Glucose, Urine Normal Normal mg/dL    Blood, Urine NEGATIVE NEGATIVE    Ketones, Urine NEGATIVE NEGATIVE mg/dL    Bilirubin, Urine NEGATIVE NEGATIVE    Urobilinogen, Urine Normal Normal mg/dL    Nitrite, Urine NEGATIVE NEGATIVE    Leukocyte Esterase, Urine 25 Ashley/µL (A) NEGATIVE   Protime-INR   Result Value Ref Range    Protime 11.3 9.8 - 12.8 seconds    INR 1.0 0.9 - 1.1   Sedimentation rate, automated   Result Value Ref Range    Sedimentation Rate 6 0 - 30 mm/h   C-reactive protein   Result Value Ref Range    C-Reactive Protein <0.10 <1.00 mg/dL   Urine Gray Tube   Result Value Ref Range    Extra Tube Hold for add-ons.    Microscopic Only, Urine   Result Value Ref Range    WBC, Urine 1-5 1-5, NONE /HPF    RBC, Urine 1-2 NONE, 1-2, 3-5 /HPF    Squamous Epithelial Cells, Urine 1-9 (SPARSE) Reference range not established. /HPF   Glucose, CSF   Result Value Ref Range    Glucose, CSF 53 40 - 70 mg/dL   Protein, CSF   Result Value Ref Range    Total Protein, CSF 41 15 - 45 mg/dL   CSF Culture/Smear    Specimen: Lumbar Puncture; Cerebrospinal Fluid   Result Value Ref Range    Gram Stain No polymorphonuclear leukocytes seen     Gram Stain No organisms seen    CSF Cell Count   Result Value Ref Range    Tube Number, CSF Tube 4       Color, CSF Colorless Colorless    Clarity, CSF Clear Clear    Color, Supernatant CSF Colorless      WBC, CSF 56 (H) 1 - 5 /uL    RBC, CSF 28 (H) 0 - 5 /uL   CSF Differential   Result Value Ref Range    Neutrophils %, Manual, CSF 0 0 - 5 %    Lymphocytes %, Manual, CSF 97 (H) 28 - 96 %    Mono/Macrophages %, Manual, CSF 3 (L) 16 - 56 %    Eosinophils %, Manual, CSF 0 Rare %    Basophils %, Manual, CSF 0 Not Established %    Immature Granulocytes %,  Manual, CSF 0 Not Established %    Blasts %, Manual, CSF 0 Not Established %    Unclassified Cells %, Manual, CSF 0 Not Established %    Plasma Cells %, Manual, CSF 0 Not Established %    Total Cells Counted,     CSF Cell Count   Result Value Ref Range    Tube Number, CSF Tube 1       Color, CSF Pink (A) Colorless    Clarity, CSF Hazy (A) Clear    Color, Supernatant CSF Colorless      WBC, CSF 46 (H) 1 - 5 /uL    RBC,  (H) 0 - 5 /uL   CSF Differential   Result Value Ref Range    Neutrophils %, Manual, CSF 2 0 - 5 %    Lymphocytes %, Manual, CSF 96 28 - 96 %    Mono/Macrophages %, Manual, CSF 2 (L) 16 - 56 %    Eosinophils %, Manual, CSF 0 Rare %    Basophils %, Manual, CSF 0 Not Established %    Immature Granulocytes %, Manual, CSF 0 Not Established %    Blasts %, Manual, CSF 0 Not Established %    Unclassified Cells %, Manual, CSF 0 Not Established %    Plasma Cells %, Manual, CSF 0 Not Established %    Total Cells Counted,     HSV PCR, CSF    Specimen: Lumbar Puncture; Cerebrospinal Fluid   Result Value Ref Range    Herpes simplex virus Type 1 PCR, Qual, CSF Not Detected Not Detected    HSV 2 PCR, QuaL, CSF Not Detected Not Detected          Assessment/Plan   Principal Problem:    Polyneuropathy  Active Problems:    Anxiety    Hypothyroidism    HSV infection  Pelvic numbness  Concern for possible aseptic meningitis  Polyneuropathy  Recent HSV-2/herpes infection completed 10 days acyclovir TID  Lower body paresthesia concerning for developing transverse myelitis      Plan:  -admit to [facility]  -full code  -vitals per protocol  -neuro consulted  -await CSF cultur results  -ID consulted  -MRI brain/cervical spine  -MRI spine reviewed  -continue IV acyclovir  -home meds ordered  -AM labs  -ok to shower with assistance  -supportive care  -will follow patient closely, await LP  CSF culture results and await MRI brain/cervical spine     I spent >75 minutes in the professional and overall care of this  patient.    PATIENT SEEN AND EXAMINED 5/17/24, H&P COMPLETED ON 5/17.24    Lita Raymond DO

## 2024-05-17 NOTE — CARE PLAN
The patient's goals for the shift include  be able to sleep through out night.    The clinical goals for the shift include no falls    Patient received medications for sleep and were effective. Patient reports no change in numbness in legs.

## 2024-05-17 NOTE — CONSULTS
Infectious Disease Consult    PATIENT NAME: Beverly Rowan    MRN: [id number]  SERVICE DATE:  5/17/2024   SERVICE TIME:  10:50 AM    SIGNATURE: Princess Rodriguez MD    PRIMARY CARE PHYSICIAN: Chad Malone MD  REASON FOR CONSULT: Aseptic meningitis  REQUESTING PHYSICIAN: Dr. Segundo DELACRUZ  58-year-old female who I was asked to evaluate for concern of aseptic meningitis.    The patient presented with concern of decreased sensation from her waist down to her lower extremities started a few days ago.  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    Lumbar puncture was done and showed 56 WBC with 97% lymphocyte, normal protein and glucose.  CSF HSV 1 and 2 both negative.      MRI lumbar and thoracic spines both without any acute findings.      She was admitted and started on IV acyclovir        PAST MEDICAL HISTORY:   Past Medical History:   Diagnosis Date    Depression     Dorsalgia, unspecified 02/12/2020    Mid back pain    Encounter for other screening for malignant neoplasm of breast 05/19/2021    Breast screening    Fracture of one rib, left side, subsequent encounter for fracture with routine healing 07/23/2018    Closed traumatic displaced fracture of rib of left side with routine healing, subsequent encounter    Hypothyroidism     Nausea 05/24/2019    Nausea in adult    Other injury of unspecified body region, initial encounter 06/21/2021    Bruising    Other malaise 10/22/2020    Malaise and fatigue    Other microscopic hematuria 05/24/2019    Other microscopic hematuria    Pain in thoracic spine 02/24/2020    Thoracic back pain    Personal history of other drug therapy 10/22/2020    History of influenza vaccination    Personal history of other infectious and parasitic diseases 05/19/2021    History of candidiasis of vagina    Personal history of other specified conditions 06/27/2022    History of dizziness     Personal history of urinary (tract) infections 05/19/2021    History of urinary tract infection    PTSD (post-traumatic stress disorder)     Right lower quadrant pain 11/27/2019    Acute right lower quadrant pain    Stiffness of unspecified hip, not elsewhere classified 02/24/2020    Hip stiffness    Unspecified symptoms and signs involving the genitourinary system 05/19/2021    UTI symptoms     PAST SURGICAL HISTORY:   Past Surgical History:   Procedure Laterality Date    FOOT SURGERY      OTHER SURGICAL HISTORY  06/18/2021    Colonoscopy    WISDOM TOOTH EXTRACTION       FAMILY HISTORY: No family history on file.  SOCIAL HISTORY:   Social History     Tobacco Use    Smoking status: Former     Types: Cigarettes    Smokeless tobacco: Never   Substance Use Topics    Alcohol use: Not Currently    Drug use: Never     CURRENT ALLERGIES:   Allergies as of 05/16/2024 - Reviewed 05/16/2024   Allergen Reaction Noted    Erythromycin Other 07/05/2006    Amoxicillin Diarrhea, GI Upset, Nausea And Vomiting, and Other 01/03/2011    Sulfa (sulfonamide antibiotics) Other 12/23/2011     MEDICATIONS:    Current Facility-Administered Medications:     acetaminophen (Tylenol) tablet 650 mg, 650 mg, oral, q4h PRN, 650 mg at 05/17/24 0910 **OR** acetaminophen (Tylenol) oral liquid 650 mg, 650 mg, nasogastric tube, q4h PRN **OR** acetaminophen (Tylenol) suppository 650 mg, 650 mg, rectal, q4h PRN, Abraham Benitez DO    acetaminophen (Tylenol) tablet 650 mg, 650 mg, oral, q4h PRN **OR** acetaminophen (Tylenol) oral liquid 650 mg, 650 mg, oral, q4h PRN **OR** acetaminophen (Tylenol) suppository 650 mg, 650 mg, rectal, q4h PRN, Abraham Benitez DO    acyclovir (Zovirax) 650 mg in dextrose 5 % in water (D5W) 100 mL IV, 10 mg/kg, intravenous, q8h, Abraham Benitez DO, Stopped at 05/17/24 0706    diazePAM (Valium) tablet 10 mg, 10 mg, oral, Nightly PRN, Abraham Benitez DO, 10 mg at 05/17/24 0128    levothyroxine (Synthroid, Levoxyl)  "tablet 100 mcg, 100 mcg, oral, Daily, Lita CHOUDHURY Segundo, DO, 100 mcg at 05/17/24 1001    melatonin tablet 3 mg, 3 mg, oral, Nightly PRN, Abraham Benitez DO    metoclopramide (Reglan) tablet 10 mg, 10 mg, oral, q6h PRN **OR** metoclopramide (Reglan) injection 10 mg, 10 mg, intravenous, q6h PRN, Abraham Benitez DO    ondansetron ODT (Zofran-ODT) disintegrating tablet 4 mg, 4 mg, oral, q8h PRN **OR** ondansetron (Zofran) injection 4 mg, 4 mg, intravenous, q8h PRN, Abraham Benitez DO    pantoprazole (ProtoNix) EC tablet 40 mg, 40 mg, oral, Daily before breakfast, 40 mg at 05/17/24 0608 **OR** pantoprazole (ProtoNix) injection 40 mg, 40 mg, intravenous, Daily before breakfast, Abraham Benitez DO    polyethylene glycol (Glycolax, Miralax) packet 17 g, 17 g, oral, Daily PRN, Abraham Benitez DO    sodium chloride 0.9% infusion, 75 mL/hr, intravenous, Continuous, Abraham Benitez DO, Last Rate: 75 mL/hr at 05/17/24 0903, 75 mL/hr at 05/17/24 0903    traZODone (Desyrel) tablet 50 mg, 50 mg, oral, Nightly, Abraham Benitez DO, 50 mg at 05/17/24 0128       COMPLETE REVIEW OF SYSTEMS:    Review of systems shows no findings other than what is described in the narrative above.  Specifically, the patient has had no significant changes in eyesight, no sore throat, no post nasal drip, no ear pains.  There has been no cough or chest pains, pleuritic or otherwise.  There has been no abdominal pain, no nausea or vomiting, nor diarrhea.   There has been no dysuria, urinary frequency, or flank pain.  There is nothing unusual in the joints or muscles, or any skin rashes or skin swellings or abscesses noted.   All other systems were reviewed and are negative.        PHYSICAL EXAM:  Patient Vitals for the past 24 hrs:   BP Temp Temp src Pulse Resp SpO2 Height Weight   05/17/24 0800 124/61 36 °C (96.8 °F) Temporal 73 15 93 % -- --   05/17/24 0007 150/78 36.8 °C (98.2 °F) Temporal 60 18 -- 1.702 m (5' 7\") 59 kg (130 lb)   05/16/24 " "2306 135/78 -- -- 78 17 99 % -- --   05/16/24 1928 151/78 -- -- 75 17 99 % -- --   05/16/24 1350 180/87 -- -- 75 16 100 % -- --   05/16/24 1221 156/71 36.6 °C (97.9 °F) -- 82 18 99 % 1.702 m (5' 7\") 63.5 kg (140 lb)     Body mass index is 20.36 kg/m².  Gen: NAD  Neck: symmetric, no mass  Cardiovascular: RRR  Respiratory: No distress   GI: Abd soft, nontender, non-distended  Extremities: no leg edema  Skin: Warm and dry.  Neuro: alert and oriented times 3  : no calderon     Labs:  Lab Results   Component Value Date    WBC 4.7 05/17/2024    HGB 12.2 05/17/2024    HCT 37.1 05/17/2024    MCV 90 05/17/2024     05/17/2024     Lab Results   Component Value Date    GLUCOSE 83 05/17/2024    CALCIUM 9.0 05/17/2024     05/17/2024    K 3.9 05/17/2024    CO2 28 05/17/2024     (H) 05/17/2024    BUN 11 05/17/2024    CREATININE 0.60 05/17/2024   ESR: --  Lab Results   Component Value Date    SEDRATE 6 05/16/2024     Lab Results   Component Value Date    CRP <0.10 05/16/2024     Lab Results   Component Value Date    ALT 11 05/17/2024    AST 13 05/17/2024    ALKPHOS 45 05/17/2024    BILITOT 0.5 05/17/2024       DATA:   Diagnostic tests reviewed for today's visit:    Labs this admission reviewed  Imagings this admission reviewed  Cultures: Reviewed        ASSESSMENT :   -Rule out aseptic meningitis  -Recent vulvar HSV-2 infection  -Lower body paresthesia concerning for developing transverse myelitis    PLAN:  -Add on meningitis PCR panel and VDRL to the CSF  -Check HIV screening test  -Continue IV acyclovir 8 mg/kg every 8 hours  -Neurology evaluation    Will continue to follow     Thank you so much for this consultation       Princess Rodriguez MD.   Infectious Disease Attending        This note was partially created using voice recognition software and is inherently subject to errors including those of syntax and \"sound-alike\" substitutions which may escape proofreading. In such instances, original meaning may be " extrapolated by contextual derivation

## 2024-05-17 NOTE — CARE PLAN
The patient's goals for the shift include  Problem: Pain - Adult  Goal: Verbalizes/displays adequate comfort level or baseline comfort level  Outcome: Progressing     Problem: Safety - Adult  Goal: Free from fall injury  Outcome: Progressing     Problem: Discharge Planning  Goal: Discharge to home or other facility with appropriate resources  Outcome: Progressing     Problem: Pain  Goal: My pain/discomfort is manageable  Outcome: Progressing     Problem: Safety  Goal: Patient will be injury free during hospitalization  Outcome: Progressing  Goal: I will remain free of falls  Outcome: Progressing     Problem: Daily Care  Goal: Daily care needs are met  Outcome: Progressing     Problem: Psychosocial Needs  Goal: Demonstrates ability to cope with hospitalization/illness  Outcome: Progressing  Goal: Collaborate with me, my family, and caregiver to identify my specific goals  Outcome: Progressing     Problem: Discharge Barriers  Goal: My discharge needs are met  Outcome: Progressing         The clinical goals for the shift include be free of injury this shift

## 2024-05-18 PROBLEM — R20.0 BILATERAL LEG NUMBNESS: Status: ACTIVE | Noted: 2024-05-18

## 2024-05-18 LAB
ALBUMIN SERPL BCP-MCNC: 3.8 G/DL (ref 3.4–5)
ALP SERPL-CCNC: 43 U/L (ref 33–110)
ALT SERPL W P-5'-P-CCNC: 11 U/L (ref 7–45)
ANION GAP SERPL CALC-SCNC: 10 MMOL/L (ref 10–20)
AST SERPL W P-5'-P-CCNC: 12 U/L (ref 9–39)
BILIRUB SERPL-MCNC: 0.4 MG/DL (ref 0–1.2)
BUN SERPL-MCNC: 13 MG/DL (ref 6–23)
CALCIUM SERPL-MCNC: 9.1 MG/DL (ref 8.6–10.3)
CHLORIDE SERPL-SCNC: 107 MMOL/L (ref 98–107)
CO2 SERPL-SCNC: 29 MMOL/L (ref 21–32)
CREAT SERPL-MCNC: 0.57 MG/DL (ref 0.5–1.05)
EGFRCR SERPLBLD CKD-EPI 2021: >90 ML/MIN/1.73M*2
ERYTHROCYTE [DISTWIDTH] IN BLOOD BY AUTOMATED COUNT: 12.7 % (ref 11.5–14.5)
GLUCOSE SERPL-MCNC: 90 MG/DL (ref 74–99)
HCT VFR BLD AUTO: 37.2 % (ref 36–46)
HGB BLD-MCNC: 12.7 G/DL (ref 12–16)
MCH RBC QN AUTO: 29.9 PG (ref 26–34)
MCHC RBC AUTO-ENTMCNC: 34.1 G/DL (ref 32–36)
MCV RBC AUTO: 88 FL (ref 80–100)
NRBC BLD-RTO: 0 /100 WBCS (ref 0–0)
PLATELET # BLD AUTO: 163 X10*3/UL (ref 150–450)
POTASSIUM SERPL-SCNC: 3.9 MMOL/L (ref 3.5–5.3)
PROT SERPL-MCNC: 6 G/DL (ref 6.4–8.2)
RBC # BLD AUTO: 4.25 X10*6/UL (ref 4–5.2)
SODIUM SERPL-SCNC: 142 MMOL/L (ref 136–145)
VDRL CSF-ACNC: NONREACTIVE
WBC # BLD AUTO: 5 X10*3/UL (ref 4.4–11.3)

## 2024-05-18 PROCEDURE — 2500000004 HC RX 250 GENERAL PHARMACY W/ HCPCS (ALT 636 FOR OP/ED): Performed by: INTERNAL MEDICINE

## 2024-05-18 PROCEDURE — 36415 COLL VENOUS BLD VENIPUNCTURE: CPT | Performed by: INTERNAL MEDICINE

## 2024-05-18 PROCEDURE — 85027 COMPLETE CBC AUTOMATED: CPT | Performed by: INTERNAL MEDICINE

## 2024-05-18 PROCEDURE — 1100000001 HC PRIVATE ROOM DAILY

## 2024-05-18 PROCEDURE — 99233 SBSQ HOSP IP/OBS HIGH 50: CPT | Performed by: INTERNAL MEDICINE

## 2024-05-18 PROCEDURE — 2500000006 HC RX 250 W HCPCS SELF ADMINISTERED DRUGS (ALT 637 FOR ALL PAYERS): Performed by: INTERNAL MEDICINE

## 2024-05-18 PROCEDURE — 80053 COMPREHEN METABOLIC PANEL: CPT | Performed by: INTERNAL MEDICINE

## 2024-05-18 PROCEDURE — 2500000001 HC RX 250 WO HCPCS SELF ADMINISTERED DRUGS (ALT 637 FOR MEDICARE OP): Performed by: INTERNAL MEDICINE

## 2024-05-18 PROCEDURE — 2500000006 HC RX 250 W HCPCS SELF ADMINISTERED DRUGS (ALT 637 FOR ALL PAYERS): Performed by: STUDENT IN AN ORGANIZED HEALTH CARE EDUCATION/TRAINING PROGRAM

## 2024-05-18 RX ORDER — FAMOTIDINE 10 MG/ML
20 INJECTION INTRAVENOUS DAILY
Status: DISCONTINUED | OUTPATIENT
Start: 2024-05-18 | End: 2024-05-18

## 2024-05-18 RX ORDER — DIAZEPAM 5 MG/1
10 TABLET ORAL ONCE
Status: COMPLETED | OUTPATIENT
Start: 2024-05-18 | End: 2024-05-18

## 2024-05-18 RX ORDER — FAMOTIDINE 20 MG/1
20 TABLET, FILM COATED ORAL 2 TIMES DAILY
Status: DISCONTINUED | OUTPATIENT
Start: 2024-05-18 | End: 2024-05-20 | Stop reason: HOSPADM

## 2024-05-18 RX ORDER — FAMOTIDINE 10 MG/ML
20 INJECTION INTRAVENOUS 2 TIMES DAILY
Status: DISCONTINUED | OUTPATIENT
Start: 2024-05-18 | End: 2024-05-20 | Stop reason: HOSPADM

## 2024-05-18 RX ORDER — FAMOTIDINE 20 MG/1
20 TABLET, FILM COATED ORAL DAILY
Status: DISCONTINUED | OUTPATIENT
Start: 2024-05-18 | End: 2024-05-18

## 2024-05-18 RX ORDER — LORAZEPAM 2 MG/ML
1 INJECTION INTRAMUSCULAR 2 TIMES DAILY PRN
Status: DISCONTINUED | OUTPATIENT
Start: 2024-05-18 | End: 2024-05-20 | Stop reason: HOSPADM

## 2024-05-18 RX ORDER — ACETAMINOPHEN 500 MG
5 TABLET ORAL NIGHTLY PRN
Status: DISCONTINUED | OUTPATIENT
Start: 2024-05-18 | End: 2024-05-20 | Stop reason: HOSPADM

## 2024-05-18 RX ADMIN — PANTOPRAZOLE SODIUM 40 MG: 40 TABLET, DELAYED RELEASE ORAL at 06:17

## 2024-05-18 RX ADMIN — DIAZEPAM 10 MG: 5 TABLET ORAL at 22:46

## 2024-05-18 RX ADMIN — TRAZODONE HYDROCHLORIDE 50 MG: 50 TABLET ORAL at 22:30

## 2024-05-18 RX ADMIN — BUSPIRONE HYDROCHLORIDE 10 MG: 10 TABLET ORAL at 22:30

## 2024-05-18 RX ADMIN — ACYCLOVIR SODIUM 590 MG: 50 INJECTION, SOLUTION INTRAVENOUS at 15:06

## 2024-05-18 RX ADMIN — LEVOTHYROXINE SODIUM 100 MCG: 0.1 TABLET ORAL at 06:16

## 2024-05-18 RX ADMIN — FAMOTIDINE 20 MG: 20 TABLET ORAL at 15:03

## 2024-05-18 RX ADMIN — ACYCLOVIR SODIUM 590 MG: 50 INJECTION, SOLUTION INTRAVENOUS at 06:16

## 2024-05-18 RX ADMIN — FAMOTIDINE 20 MG: 10 INJECTION, SOLUTION INTRAVENOUS at 20:41

## 2024-05-18 RX ADMIN — SERTRALINE HYDROCHLORIDE 100 MG: 100 TABLET ORAL at 08:24

## 2024-05-18 RX ADMIN — ACYCLOVIR SODIUM 590 MG: 50 INJECTION, SOLUTION INTRAVENOUS at 22:31

## 2024-05-18 RX ADMIN — LORAZEPAM 1 MG: 2 INJECTION INTRAMUSCULAR; INTRAVENOUS at 16:38

## 2024-05-18 RX ADMIN — BUSPIRONE HYDROCHLORIDE 10 MG: 10 TABLET ORAL at 08:24

## 2024-05-18 RX ADMIN — SERTRALINE HYDROCHLORIDE 100 MG: 100 TABLET ORAL at 22:30

## 2024-05-18 RX ADMIN — DEXTROSE MONOHYDRATE 1000 MG: 5 INJECTION INTRAVENOUS at 15:06

## 2024-05-18 ASSESSMENT — PAIN SCALES - GENERAL
PAINLEVEL_OUTOF10: 0 - NO PAIN
PAINLEVEL_OUTOF10: 0 - NO PAIN

## 2024-05-18 NOTE — PROGRESS NOTES
"INPATIENT PROGRESS NOTES    PATIENT NAME: Beverly Rowan    MRN: [id number]  SERVICE DATE:  5/18/2024   SERVICE TIME:  2:22 PM    SIGNATURE: Princess Rodriguez MD      SUBJECTIVE  Afebrile  No events overnight       OBJECTIVE  PHYSICAL EXAM:   Patient Vitals for the past 24 hrs:   BP Temp Temp src Pulse Resp SpO2   05/18/24 0803 123/57 36.4 °C (97.5 °F) Temporal 70 17 95 %   05/17/24 2000 136/63 36.6 °C (97.9 °F) Temporal 64 15 97 %   05/17/24 1550 132/60 36 °C (96.8 °F) Temporal 63 16 98 %       CVS RRR  Respiratory: No distress       Labs:  Lab Results   Component Value Date    WBC 5.0 05/18/2024    HGB 12.7 05/18/2024    HCT 37.2 05/18/2024    MCV 88 05/18/2024     05/18/2024     Lab Results   Component Value Date    GLUCOSE 90 05/18/2024    CALCIUM 9.1 05/18/2024     05/18/2024    K 3.9 05/18/2024    CO2 29 05/18/2024     05/18/2024    BUN 13 05/18/2024    CREATININE 0.57 05/18/2024   ESR: --  Lab Results   Component Value Date    SEDRATE 6 05/16/2024     Lab Results   Component Value Date    CRP <0.10 05/16/2024     Lab Results   Component Value Date    ALT 11 05/18/2024    AST 12 05/18/2024    ALKPHOS 43 05/18/2024    BILITOT 0.4 05/18/2024         DATA:   Diagnostic tests reviewed for today's visit:    Labs this admission reviewed  Imagings this admission reviewed  Cultures: Reviewed      ASSESSMENT :   -Rule out aseptic meningitis  -Recent vulvar HSV-2 infection  -Lower body paresthesia concerning for developing transverse myelitis     PLAN:  -Meningitis PCR panel and CSF VDRL negative   -HIV screen negative   -Possible post infection inflammatory response   -Continue IV acyclovir 8 mg/kg every 8 hours  -Agree with steroid trial     Discussed the plan with neurology team          Princess Rodriguez MD.   Infectious Diseases Attending            This note was partially created using voice recognition software and is inherently subject to errors including those of syntax and \"sound-alike\" " substitutions which may escape proofreading. In such instances, original meaning may be extrapolated by contextual derivation

## 2024-05-18 NOTE — PROGRESS NOTES
"Beverly Rowan is a 58 y.o. female on day 0 of admission presenting with Polyneuropathy.      Subjective   Seen and examined with DR. Vazquez.  No overnight events.  Denies fever, chills, nausea  Able to ambulate but still has pelvic numbness.        Objective   GENERAL APPEARANCE:  No distress, alert, interactive and cooperative.      MENTAL STATE:   Orientation was normal to time, place and person. Recent and remote memory was intact.  Attention span and concentration were normal. Language testing was normal for comprehension, repetition, expression, and naming.     CRANIAL NERVES:   CN 2      Visual fields full to confrontation.   CN 3, 4, 6   Pupils round, 4 mm in diameter, equally reactive to light. Lids symmetric; no ptosis. EOMs normal alignment, full range with normal saccades, pursuit and convergence.   No nystagmus.   CN 5   Facial sensation intact bilaterally.   CN 7   Normal and symmetric facial strength. Nasolabial folds symmetric.   CN 8   Hearing intact to conversation.  CN 9/10  Palate elevates symmetrically.   CN 11   Normal strength of shoulder shrug and neck turning.   CN 12   Tongue midline, with normal bulk and strength; no fasciculations.      MOTOR:   Muscle bulk and tone were normal in both upper and lower extremities.   No fasciculations, tremor or other abnormal movements were present.   SENSORY:   In both upper and lower extremities, sensation was intact to light touch       COORDINATION:    In both upper extremities, finger-nose-finger was intact without dysmetria or overshoot.   In both lower extremities, heel-to-shin was intact.       GAIT:   Not assessed today.    Last Recorded Vitals  Blood pressure 120/65, pulse 67, temperature 36.9 °C (98.4 °F), resp. rate 18, height 1.702 m (5' 7\"), weight 59 kg (130 lb), SpO2 96%.    Physical Exam  Neurological Exam    Relevant Results  Scheduled medications  acyclovir, 10 mg/kg, intravenous, q8h  busPIRone, 10 mg, oral, BID  famotidine, 20 mg, " oral, BID   Or  famotidine, 20 mg, intravenous, BID  levothyroxine, 100 mcg, oral, Daily  methylPREDNISolone sodium succinate (PF), 1,000 mg, intravenous, q24h  sertraline, 100 mg, oral, BID  traZODone, 50 mg, oral, Nightly      Continuous medications     PRN medications  PRN medications: acetaminophen **OR** acetaminophen **OR** acetaminophen, acetaminophen **OR** acetaminophen **OR** acetaminophen, LORazepam, melatonin, metoclopramide **OR** metoclopramide, ondansetron ODT **OR** ondansetron, polyethylene glycol  Results for orders placed or performed during the hospital encounter of 05/16/24 (from the past 24 hour(s))   CBC   Result Value Ref Range    WBC 5.0 4.4 - 11.3 x10*3/uL    nRBC 0.0 0.0 - 0.0 /100 WBCs    RBC 4.25 4.00 - 5.20 x10*6/uL    Hemoglobin 12.7 12.0 - 16.0 g/dL    Hematocrit 37.2 36.0 - 46.0 %    MCV 88 80 - 100 fL    MCH 29.9 26.0 - 34.0 pg    MCHC 34.1 32.0 - 36.0 g/dL    RDW 12.7 11.5 - 14.5 %    Platelets 163 150 - 450 x10*3/uL   Comprehensive Metabolic Panel   Result Value Ref Range    Glucose 90 74 - 99 mg/dL    Sodium 142 136 - 145 mmol/L    Potassium 3.9 3.5 - 5.3 mmol/L    Chloride 107 98 - 107 mmol/L    Bicarbonate 29 21 - 32 mmol/L    Anion Gap 10 10 - 20 mmol/L    Urea Nitrogen 13 6 - 23 mg/dL    Creatinine 0.57 0.50 - 1.05 mg/dL    eGFR >90 >60 mL/min/1.73m*2    Calcium 9.1 8.6 - 10.3 mg/dL    Albumin 3.8 3.4 - 5.0 g/dL    Alkaline Phosphatase 43 33 - 110 U/L    Total Protein 6.0 (L) 6.4 - 8.2 g/dL    AST 12 9 - 39 U/L    Bilirubin, Total 0.4 0.0 - 1.2 mg/dL    ALT 11 7 - 45 U/L     MR cervical spine w and wo IV contrast    Result Date: 5/18/2024  Interpreted By:  Marco Morel, STUDY: MR CERVICAL SPINE W AND WO IV CONTRAST;  5/17/2024 10:43 pm   INDICATION: Signs/Symptoms:c/f myelitis.   COMPARISON: CT cervical spine dated 12/20/2023. MRI thoracic and lumbar spine performed 05/16/2024.   ACCESSION NUMBER(S): [id number]   ORDERING CLINICIAN: CÉSAR CHURCH   TECHNIQUE:  Multiplanar multisequence MR imaging was performed through the cervical spine prior to and following the administration of intravenous contrast. Noncontrast sagittal T1, T2, STIR, axial T1 and axial T2 weighted images were acquired through the cervical spine. Postcontrast sagittal and axial imaging obtained. A total of 12 mL Dotarem intravenous contrast was administered.   Mild motion artifact.   FINDINGS: Cord: Normal in caliber and signal. No abnormal enhancement.   Epidural fluid: None.   Alignment: Unchanged alignment. 1.5 mm anterolisthesis of T1 on T2. Trace anterolisthesis of C3 on C4. Equivocal/trace anterolisthesis of C7 on T1. Mild straightening/reversal of the expected cervical lordosis.   Vertebral bodies: Similar degenerative height loss involving the C4-C6 endplates. Vertebral body heights are otherwise maintained.   Marrow signal: No abnormal STIR hyperintensity or abnormal osseous enhancement. C7 vertebral body hemangioma.   Intervertebral Discs: Moderate to severe degenerative disc height loss at C4-C6 with moderate degenerative disc height loss at C6-C7. Multilevel disc desiccation. No abnormal enhancement.     Degenerative change:   C1-C2:  Unremarkable.   C2-C3:  Minimal/mild bilateral facet arthropathy. No spinal canal stenosis or neural foraminal narrowing.   C3-C4:  Mild-to-moderate right facet arthropathy. Minimal disc uncovering and mild uncovertebral spurring. No spinal canal stenosis. No substantial neural foraminal narrowing   C4-C5:  Posterior disc osteophyte components. Ventral thecal sac narrowing and mild lateral recess narrowing without spinal canal stenosis minimal/mild neural foraminal narrowing.   C5-C6:  Mild ligamentum flavum thickening/infolding. Posterior disc osteophyte components with uncovertebral spurring. Mild spinal canal stenosis. Mild bilateral neural foraminal narrowing   C6-C7: Mild ligamentum flavum thickening/infolding. Mild disc bulge with uncovertebral/endplate  spurring. Slight thecal sac indentation with no spinal canal stenosis. Minimal neural foraminal narrowing.   C7-T1:  Moderate right and severe left facet arthropathy. Minimal disc uncovering. No spinal canal stenosis. Minimal neural foraminal narrowing.   Soft tissues: The prevertebral and posterior paraspinous soft tissues are within normal limits.       Normal MR appearance of the cervical spinal cord with no focal cord signal abnormality, edema, or abnormal enhancement.   Similar trace degenerative spondylolisthesis. There is mild spinal canal stenosis at C5-C6 with mild bilateral neural foraminal narrowing. Otherwise no substantial spinal canal stenosis or neural foraminal narrowing at the remaining cervical levels.   MACRO: None   Signed by: Marco Morel 5/18/2024 2:47 PM Dictation workstation:   [id number]    MR brain w and wo IV contrast    Result Date: 5/18/2024  Interpreted By:  Marco Morel, STUDY: MR BRAIN W AND WO IV CONTRAST;  5/17/2024 10:43 pm   INDICATION: Signs/Symptoms:c/f meningitis.   COMPARISON: CT head dated 12/20/2023.   ACCESSION NUMBER(S): [id number]   ORDERING CLINICIAN: CÉSAR CHURCH   TECHNIQUE: Standard multiplanar multisequence MR imaging was performed through the brain prior to and following administration of 12 mL Dotarem intravenous contrast.   FINDINGS: Parenchyma: There is no diffusion restriction abnormality to suggest acute infarct.  No evidence of recent hemorrhage. There is no mass effect or midline shift. No abnormal parenchymal or leptomeningeal enhancement. Artifact versus trace tiny nonspecific T2/FLAIR white matter hyperintensities within the bilateral cerebral hemispheres. Otherwise normal MR appearance of the brain parenchyma.   CSF Spaces: The ventricles, sulci and basal cisterns are within normal limits for age mild senescent change. No evidence of ventriculitis. Basilar cisterns are patent.   Extra-axial spaces: No extra-axial fluid collection.   Intracranial  Flow Voids: Patent appearing.   Paranasal Sinuses: Right sphenoid mucous retention cyst. Mild mucosal thickening of the inferior right maxillary paranasal sinus and left sphenoid sinus. Trace diffuse mucosal thickening of the bilateral ethmoids.   Mastoids: Clear.   Orbits: Normal.   Calvarium: No suspicious osseous marrow signal.       No acute infarct, recent hemorrhage, or intracranial mass effect. No abnormal parenchymal or leptomeningeal enhancement to indicate active intracranial infection such as meningitis, abscess, or ventriculitis.   Mild senescent change with minimal chronic small vessel ischemic disease suspected.   MACRO: None   Signed by: Marco Morel 5/18/2024 2:37 PM Dictation workstation:   [id number]    MR thoracic spine w and wo IV contrast    Result Date: 5/16/2024  Interpreted By:  Pascual Hart and Sheng Max STUDY: MR THORACIC SPINE W AND WO IV CONTRAST; MR LUMBAR SPINE W AND WO IV CONTRAST;  5/16/2024 4:50 pm   INDICATION: Signs/Symptoms:groin and bilateral leg numbnes starting 1 week after genital herpes, motor intact, 3+ reflexes; Signs/Symptoms:groin and bilateral leg numbnes starting 1 week after genital herpes, motor intact, 3+ reflexe.   Per EMR: 58-year-old female history of genital HSV density due to decreased sensation in the groin and bilateral lower extremities of the past week. MRI thoracic and lumbar spine to evaluate for space-occupying lesion versus epidural abscess versus transverse myelitis versus GBS   COMPARISON: None.   ACCESSION NUMBER(S): [id number]; [id number]   ORDERING CLINICIAN: CHELY MICHAELS   TECHNIQUE: Sagittal T1, T2, STIR and axial T2 and T1 weighted MR images of the thoracic and lumbar spine were obtained.   FINDINGS: MRI thoracic spine:   Alignment: Within normal limits.   Vertebrae/Intervertebral Discs: The vertebral body heights are intact.  Marrow signal is within normal limits. No increased signal on STIR sequences to suggest significant bone  marrow edema. There is disc height loss most prominent at levels T5-T6, T6-T7, T7-T8 and T8-T9. There is decreased disc signal within the nucleus pulposus throughout all thoracic levels most prominent at levels T2-T3, T3-T4, T4-T5, T5-T6, T6-T7, T7-T8 and T8-T9.   Spinal cord: Preserved in signal.   T1-2: There is no significant central canal or neural foraminal stenosis. T2-3:  There is no significant central canal or neural foraminal stenosis. T3-4: There is no significant central canal or neural foraminal stenosis. T4-5: There is no significant central canal or neural foraminal stenosis. T5-6: There is no significant central canal or neural foraminal stenosis. T6-7: There is no significant central canal or neural foraminal stenosis. T7-8: There is no significant central canal or neural foraminal stenosis. T8-9: There is no significant central canal or neural foraminal stenosis. T9-10: There is no significant central canal or neural foraminal stenosis. T10-11: There is no significant central canal or neural foraminal stenosis. T11-12: There is no significant central canal or neural foraminal stenosis.   Paraspinous Soft Tissues: Within normal limits.     MRI lumbar spine:   Alignment: The vertebral alignment is maintained.   Vertebrae/Intervertebral Discs: The vertebral bodies demonstrate expected height. Increased signal on STIR sequences to suggest significant bone marrow edema. There is a T1 and T2 hyperintense 1.2 cm lesion the level of L4 likely representing a typical vertebral hemangioma versus focal fatty infiltration. There is disc height loss most prominent at levels L4-L5 and L5-S1. There is decreased disc signal within the nucleus pulposus throughout all lumbar vertebral levels.   Conus: The lower thoracic cord appears unremarkable. The conus terminates at superior endplate of L2. No clear pathologic enhancement was identified in the lumbar spinal canal.   T12-L1:  There is no significant central canal  or neural foraminal stenosis.   L1-2:  There is no significant central canal or neural foraminal stenosis.   L2-3: There is disc bulge and bilateral facet joint hypertrophy without significant central canal or neural foraminal stenosis.   L3-4: There is disc bulge, bilateral ligamentum flavum thickening and bilateral facet joint hypertrophy with facet joint edema resulting partial effacement of the bilateral lateral recesses. Constellation of findings results in moderate central canal stenosis and minimal to moderate left neural foraminal stenosis without clear compression of the exiting left L3 nerve root.   L4-5: There is disc bulge, bilateral ligamentum flavum thickening and bilateral facet joint hypertrophy partially effacing the bilateral lateral recesses. Constellation of findings results in mild central canal stenosis, and mild right neural foraminal stenosis . There are bilateral nonobstructing synovial cysts.   L5-S1: There is disc bulge, bilateral ligamentum flavum thickening resulting in mild left and moderate right neural foraminal stenosis abutting and possibly irritating the exiting right L5 nerve root. No high-grade central canal stenosis.       MRI thoracic spine: 1. No abnormal thoracic spinal cord signal. 2. No significant central canal or neural foraminal stenosis in the thoracic spine.   MRI lumbar spine: 1. No abnormal lumbar spinal cord signal. 2. Multilevel degenerative changes in the lumbar region as above described worst at L5-S1 where there is moderate right foraminal stenosis with potential irritation of the exiting right L5 nerve root.   I personally reviewed the images/study and I agree with the findings as stated by Dr. Layton Corbett. This study was interpreted at [place], [place].   MACRO: None   Signed by: Pascual Hart 5/16/2024 6:08 PM Dictation workstation:   [id number]    MR lumbar spine w and wo IV contrast    Result Date:  5/16/2024  Interpreted By:  Pascual Hart and Sheng Max STUDY: MR THORACIC SPINE W AND WO IV CONTRAST; MR LUMBAR SPINE W AND WO IV CONTRAST;  5/16/2024 4:50 pm   INDICATION: Signs/Symptoms:groin and bilateral leg numbnes starting 1 week after genital herpes, motor intact, 3+ reflexes; Signs/Symptoms:groin and bilateral leg numbnes starting 1 week after genital herpes, motor intact, 3+ reflexe.   Per EMR: 58-year-old female history of genital HSV density due to decreased sensation in the groin and bilateral lower extremities of the past week. MRI thoracic and lumbar spine to evaluate for space-occupying lesion versus epidural abscess versus transverse myelitis versus GBS   COMPARISON: None.   ACCESSION NUMBER(S): [id number]; [id number]   ORDERING CLINICIAN: CHELY MICHAELS   TECHNIQUE: Sagittal T1, T2, STIR and axial T2 and T1 weighted MR images of the thoracic and lumbar spine were obtained.   FINDINGS: MRI thoracic spine:   Alignment: Within normal limits.   Vertebrae/Intervertebral Discs: The vertebral body heights are intact.  Marrow signal is within normal limits. No increased signal on STIR sequences to suggest significant bone marrow edema. There is disc height loss most prominent at levels T5-T6, T6-T7, T7-T8 and T8-T9. There is decreased disc signal within the nucleus pulposus throughout all thoracic levels most prominent at levels T2-T3, T3-T4, T4-T5, T5-T6, T6-T7, T7-T8 and T8-T9.   Spinal cord: Preserved in signal.   T1-2: There is no significant central canal or neural foraminal stenosis. T2-3:  There is no significant central canal or neural foraminal stenosis. T3-4: There is no significant central canal or neural foraminal stenosis. T4-5: There is no significant central canal or neural foraminal stenosis. T5-6: There is no significant central canal or neural foraminal stenosis. T6-7: There is no significant central canal or neural foraminal stenosis. T7-8: There is no significant central canal or  neural foraminal stenosis. T8-9: There is no significant central canal or neural foraminal stenosis. T9-10: There is no significant central canal or neural foraminal stenosis. T10-11: There is no significant central canal or neural foraminal stenosis. T11-12: There is no significant central canal or neural foraminal stenosis.   Paraspinous Soft Tissues: Within normal limits.     MRI lumbar spine:   Alignment: The vertebral alignment is maintained.   Vertebrae/Intervertebral Discs: The vertebral bodies demonstrate expected height. Increased signal on STIR sequences to suggest significant bone marrow edema. There is a T1 and T2 hyperintense 1.2 cm lesion the level of L4 likely representing a typical vertebral hemangioma versus focal fatty infiltration. There is disc height loss most prominent at levels L4-L5 and L5-S1. There is decreased disc signal within the nucleus pulposus throughout all lumbar vertebral levels.   Conus: The lower thoracic cord appears unremarkable. The conus terminates at superior endplate of L2. No clear pathologic enhancement was identified in the lumbar spinal canal.   T12-L1:  There is no significant central canal or neural foraminal stenosis.   L1-2:  There is no significant central canal or neural foraminal stenosis.   L2-3: There is disc bulge and bilateral facet joint hypertrophy without significant central canal or neural foraminal stenosis.   L3-4: There is disc bulge, bilateral ligamentum flavum thickening and bilateral facet joint hypertrophy with facet joint edema resulting partial effacement of the bilateral lateral recesses. Constellation of findings results in moderate central canal stenosis and minimal to moderate left neural foraminal stenosis without clear compression of the exiting left L3 nerve root.   L4-5: There is disc bulge, bilateral ligamentum flavum thickening and bilateral facet joint hypertrophy partially effacing the bilateral lateral recesses. Constellation of  findings results in mild central canal stenosis, and mild right neural foraminal stenosis . There are bilateral nonobstructing synovial cysts.   L5-S1: There is disc bulge, bilateral ligamentum flavum thickening resulting in mild left and moderate right neural foraminal stenosis abutting and possibly irritating the exiting right L5 nerve root. No high-grade central canal stenosis.       MRI thoracic spine: 1. No abnormal thoracic spinal cord signal. 2. No significant central canal or neural foraminal stenosis in the thoracic spine.   MRI lumbar spine: 1. No abnormal lumbar spinal cord signal. 2. Multilevel degenerative changes in the lumbar region as above described worst at L5-S1 where there is moderate right foraminal stenosis with potential irritation of the exiting right L5 nerve root.   I personally reviewed the images/study and I agree with the findings as stated by Dr. Layton Corbett. This study was interpreted at [place], [place].   MACRO: None   Signed by: Pascual Hart 5/16/2024 6:08 PM Dictation workstation:   [id number]     Assessment/Plan      Principal Problem:    Polyneuropathy  Active Problems:    Anxiety    Hypothyroidism    Peripheral polyneuropathy    HSV infection    Bilateral leg numbness  Possible HSV radiculitis and/or post infection inflammation.    MRI brain and c-spine with and without contrast- reviewed with Dr. Vazquez and unremarkable.   Viral CSF studies and autoimmune studies ordered and pending  Recommend IV acyclovir and IV steroids for 3 days- deferred to primary care team and ID- see orders.     Case/plan discussed and pt seen with DR. Vazquez, neurology will follow up           I spent 45 minutes in the professional and overall care of this patient.      Kenya Sanchez, APRN-CNP

## 2024-05-18 NOTE — CARE PLAN
The patient's goals for the shift include to remain free from pelvic numbness and tingling.    The clinical goals for the shift include Patient will remain free from pelvic numbness and tingling through end of shift.

## 2024-05-18 NOTE — CARE PLAN
The patient's goals for the shift include      The clinical goals for the shift include free from injury    Safety maintained thru the end of shift.

## 2024-05-18 NOTE — PROGRESS NOTES
"Beverly Rowan is a 58 y.o. female on day 0 of admission presenting with Polyneuropathy.    Subjective   Patient seen and examined, no acute events overnight.  She still complains of pelvic numbness.  She is able to ambulate.  She appears comfortable sitting in the chair, she denies fever, chills, chest pain, shortness of breath, abdominal pain, nausea, vomiting or diarrhea.    She is very anxious to be on steroids, discussed that we ordered pepcid BID to help with her stomach, melatonin to help her sleep, PRN ativan for her severe anxiety/concerns. Discussed importance of acyclovir and solumedrol to help improve her symptoms, neuro to speak with her again this afternoon, she is agreeable to try steroids now.      Objective     Physical Exam  Vitals reviewed.   Constitutional:       Appearance: Normal appearance.   HENT:      Head: Normocephalic and atraumatic.      Nose: Nose normal.   Cardiovascular:      Rate and Rhythm: Normal rate and regular rhythm.      Pulses: Normal pulses.      Heart sounds: Normal heart sounds.   Pulmonary:      Effort: Pulmonary effort is normal.      Breath sounds: Normal breath sounds. No rales.   Abdominal:      General: Abdomen is flat. Bowel sounds are normal.      Palpations: Abdomen is soft.      Tenderness: There is no abdominal tenderness.   Musculoskeletal:      Comments: + numbness bilaterally in her pelvic region   Skin:     General: Skin is warm and dry.   Neurological:      Mental Status: She is alert and oriented to person, place, and time. Mental status is at baseline.      Comments: + numbness bilaterally in her pelvic region   Psychiatric:         Mood and Affect: Mood normal.         Last Recorded Vitals  Blood pressure 123/57, pulse 70, temperature 36.4 °C (97.5 °F), temperature source Temporal, resp. rate 17, height 1.702 m (5' 7\"), weight 59 kg (130 lb), SpO2 95%.  Intake/Output last 3 Shifts:  I/O last 3 completed shifts:  In: 2370.9 (40.2 mL/kg) [P.O.:960; " I.V.:961.3 (16.3 mL/kg); IV Piggyback:449.6]  Out: 900 (15.3 mL/kg) [Urine:900 (0.4 mL/kg/hr)]  Weight: 59 kg     Relevant Results  Scheduled medications  acyclovir, 10 mg/kg, intravenous, q8h  busPIRone, 10 mg, oral, BID  famotidine, 20 mg, oral, BID   Or  famotidine, 20 mg, intravenous, BID  levothyroxine, 100 mcg, oral, Daily  methylPREDNISolone sodium succinate (PF), 1,000 mg, intravenous, q24h  sertraline, 100 mg, oral, BID  traZODone, 50 mg, oral, Nightly      Continuous medications     PRN medications  PRN medications: acetaminophen **OR** acetaminophen **OR** acetaminophen, acetaminophen **OR** acetaminophen **OR** acetaminophen, LORazepam, melatonin, metoclopramide **OR** metoclopramide, ondansetron ODT **OR** ondansetron, polyethylene glycol  Results from last 7 days   Lab Units 05/18/24  0521 05/17/24  0529 05/16/24  1423   WBC AUTO x10*3/uL 5.0 4.7 4.9   RBC AUTO x10*6/uL 4.25 4.11 4.37   HEMOGLOBIN g/dL 12.7 12.2 13.0     Results from last 7 days   Lab Units 05/18/24  0521 05/17/24  0529 05/16/24  1423   SODIUM mmol/L 142 142 141   POTASSIUM mmol/L 3.9 3.9 3.5   CHLORIDE mmol/L 107 109* 105   CO2 mmol/L 29 28 30   BUN mg/dL 13 11 11   CREATININE mg/dL 0.57 0.60 0.66   CALCIUM mg/dL 9.1 9.0 9.6   BILIRUBIN TOTAL mg/dL 0.4 0.5 0.4   ALT U/L 11 11 13   AST U/L 12 13 15       Assessment/Plan   Principal Problem:    Polyneuropathy  Active Problems:    Anxiety    Hypothyroidism    Peripheral polyneuropathy    HSV infection    Bilateral leg numbness  Pelvic numbness  Concern for possible aseptic meningitis  Polyneuropathy  Recent HSV-2/herpes infection completed 10 days acyclovir TID  Lower body paresthesia concerning for developing transverse myelitis    Plan:  -IV steroids/solumed 1 gram TID  -continue IV acyclovir  -pepcid, melatonin ordered to help with possible steroid side effects, PRN ativan for severe anxiety, patient is extremely worried to be on steroids but is agreeable to try at this time  -full  code  -vitals per protocol  -neuro consulted  -await CSF culture results  -ID consulted  -MRI brain/cervical spine done  -MRI spine reviewed  -home meds ordered  -AM labs  -ok to shower with assistance  -supportive care  -will follow patient closely, discussed with RN, neuro and patient          I spent 35 minutes in the professional and overall care of this patient.      Lita Raymond, DO

## 2024-05-19 LAB
ALBUMIN SERPL BCP-MCNC: 4.1 G/DL (ref 3.4–5)
ALP SERPL-CCNC: 51 U/L (ref 33–110)
ALT SERPL W P-5'-P-CCNC: 11 U/L (ref 7–45)
ANION GAP SERPL CALC-SCNC: 10 MMOL/L (ref 10–20)
AST SERPL W P-5'-P-CCNC: 12 U/L (ref 9–39)
BILIRUB SERPL-MCNC: 0.4 MG/DL (ref 0–1.2)
BUN SERPL-MCNC: 18 MG/DL (ref 6–23)
CALCIUM SERPL-MCNC: 9.8 MG/DL (ref 8.6–10.3)
CHLORIDE SERPL-SCNC: 107 MMOL/L (ref 98–107)
CO2 SERPL-SCNC: 28 MMOL/L (ref 21–32)
CREAT SERPL-MCNC: 0.6 MG/DL (ref 0.5–1.05)
EGFRCR SERPLBLD CKD-EPI 2021: >90 ML/MIN/1.73M*2
ERYTHROCYTE [DISTWIDTH] IN BLOOD BY AUTOMATED COUNT: 12.3 % (ref 11.5–14.5)
GLUCOSE SERPL-MCNC: 124 MG/DL (ref 74–99)
HCT VFR BLD AUTO: 38.4 % (ref 36–46)
HGB BLD-MCNC: 13.2 G/DL (ref 12–16)
MCH RBC QN AUTO: 29.7 PG (ref 26–34)
MCHC RBC AUTO-ENTMCNC: 34.4 G/DL (ref 32–36)
MCV RBC AUTO: 87 FL (ref 80–100)
NRBC BLD-RTO: 0 /100 WBCS (ref 0–0)
PLATELET # BLD AUTO: 177 X10*3/UL (ref 150–450)
POTASSIUM SERPL-SCNC: 4.2 MMOL/L (ref 3.5–5.3)
PROT SERPL-MCNC: 6.4 G/DL (ref 6.4–8.2)
RBC # BLD AUTO: 4.44 X10*6/UL (ref 4–5.2)
SODIUM SERPL-SCNC: 141 MMOL/L (ref 136–145)
WBC # BLD AUTO: 7.3 X10*3/UL (ref 4.4–11.3)

## 2024-05-19 PROCEDURE — 84075 ASSAY ALKALINE PHOSPHATASE: CPT | Performed by: INTERNAL MEDICINE

## 2024-05-19 PROCEDURE — 2500000004 HC RX 250 GENERAL PHARMACY W/ HCPCS (ALT 636 FOR OP/ED): Performed by: INTERNAL MEDICINE

## 2024-05-19 PROCEDURE — 85027 COMPLETE CBC AUTOMATED: CPT | Performed by: INTERNAL MEDICINE

## 2024-05-19 PROCEDURE — 2500000001 HC RX 250 WO HCPCS SELF ADMINISTERED DRUGS (ALT 637 FOR MEDICARE OP): Performed by: INTERNAL MEDICINE

## 2024-05-19 PROCEDURE — 1100000001 HC PRIVATE ROOM DAILY

## 2024-05-19 PROCEDURE — 2500000006 HC RX 250 W HCPCS SELF ADMINISTERED DRUGS (ALT 637 FOR ALL PAYERS): Performed by: INTERNAL MEDICINE

## 2024-05-19 PROCEDURE — 36415 COLL VENOUS BLD VENIPUNCTURE: CPT | Performed by: INTERNAL MEDICINE

## 2024-05-19 PROCEDURE — 99232 SBSQ HOSP IP/OBS MODERATE 35: CPT | Performed by: INTERNAL MEDICINE

## 2024-05-19 RX ADMIN — ACYCLOVIR SODIUM 590 MG: 50 INJECTION, SOLUTION INTRAVENOUS at 06:39

## 2024-05-19 RX ADMIN — DEXTROSE MONOHYDRATE 1000 MG: 5 INJECTION INTRAVENOUS at 14:20

## 2024-05-19 RX ADMIN — BUSPIRONE HYDROCHLORIDE 10 MG: 10 TABLET ORAL at 20:06

## 2024-05-19 RX ADMIN — TRAZODONE HYDROCHLORIDE 50 MG: 50 TABLET ORAL at 20:06

## 2024-05-19 RX ADMIN — BUSPIRONE HYDROCHLORIDE 10 MG: 10 TABLET ORAL at 08:27

## 2024-05-19 RX ADMIN — ACYCLOVIR SODIUM 590 MG: 50 INJECTION, SOLUTION INTRAVENOUS at 15:47

## 2024-05-19 RX ADMIN — LEVOTHYROXINE SODIUM 100 MCG: 0.1 TABLET ORAL at 06:39

## 2024-05-19 RX ADMIN — FAMOTIDINE 20 MG: 20 TABLET ORAL at 08:27

## 2024-05-19 RX ADMIN — SERTRALINE HYDROCHLORIDE 100 MG: 100 TABLET ORAL at 08:27

## 2024-05-19 RX ADMIN — SERTRALINE HYDROCHLORIDE 100 MG: 100 TABLET ORAL at 20:06

## 2024-05-19 RX ADMIN — Medication 5 MG: at 20:06

## 2024-05-19 RX ADMIN — ACYCLOVIR SODIUM 590 MG: 50 INJECTION, SOLUTION INTRAVENOUS at 22:00

## 2024-05-19 RX ADMIN — LORAZEPAM 1 MG: 2 INJECTION INTRAMUSCULAR; INTRAVENOUS at 14:21

## 2024-05-19 RX ADMIN — FAMOTIDINE 20 MG: 20 TABLET ORAL at 20:06

## 2024-05-19 ASSESSMENT — COGNITIVE AND FUNCTIONAL STATUS - GENERAL
MOBILITY SCORE: 24
DAILY ACTIVITIY SCORE: 24
MOBILITY SCORE: 24
DAILY ACTIVITIY SCORE: 24

## 2024-05-19 ASSESSMENT — PAIN SCALES - GENERAL
PAINLEVEL_OUTOF10: 0 - NO PAIN
PAINLEVEL_OUTOF10: 0 - NO PAIN

## 2024-05-19 NOTE — PROGRESS NOTES
"INPATIENT PROGRESS NOTES    PATIENT NAME: Beverly Rowan    MRN: [id number]  SERVICE DATE:  5/19/2024   SERVICE TIME:  11:07 AM    SIGNATURE: Princess Rodriguez MD      SUBJECTIVE  Afebrile  No events overnight   Reported slight improvement of the numbness    OBJECTIVE  PHYSICAL EXAM:   Patient Vitals for the past 24 hrs:   BP Temp Pulse Resp SpO2   05/19/24 0800 126/60 36.6 °C (97.9 °F) 67 16 97 %   05/18/24 1949 130/64 36.4 °C (97.5 °F) 77 16 98 %   05/18/24 1600 120/65 36.9 °C (98.4 °F) 67 18 96 %       CVS RRR  Respiratory: No distress       Labs:  Lab Results   Component Value Date    WBC 7.3 05/19/2024    HGB 13.2 05/19/2024    HCT 38.4 05/19/2024    MCV 87 05/19/2024     05/19/2024     Lab Results   Component Value Date    GLUCOSE 124 (H) 05/19/2024    CALCIUM 9.8 05/19/2024     05/19/2024    K 4.2 05/19/2024    CO2 28 05/19/2024     05/19/2024    BUN 18 05/19/2024    CREATININE 0.60 05/19/2024   ESR: --  Lab Results   Component Value Date    SEDRATE 6 05/16/2024     Lab Results   Component Value Date    CRP <0.10 05/16/2024     Lab Results   Component Value Date    ALT 11 05/19/2024    AST 12 05/19/2024    ALKPHOS 51 05/19/2024    BILITOT 0.4 05/19/2024         DATA:   Diagnostic tests reviewed for today's visit:    Labs this admission reviewed  Imagings this admission reviewed  Cultures: Reviewed      ASSESSMENT :   -? Aseptic meningitis  -Recent vulvar HSV-2 infection  -Lower body paresthesia concerning for developing transverse myelitis     PLAN:  -Meningitis PCR panel, HSV 1 and 2 PCR, and CSF VDRL negative   -Possible post infection inflammatory response   -Continue IV acyclovir and steroid  -Renal function stable        Princess Rodriguez MD.   Infectious Diseases Attending            This note was partially created using voice recognition software and is inherently subject to errors including those of syntax and \"sound-alike\" substitutions which may escape proofreading. In such instances, " original meaning may be extrapolated by contextual derivation

## 2024-05-19 NOTE — CARE PLAN
Problem: Pain - Adult  Goal: Verbalizes/displays adequate comfort level or baseline comfort level  Outcome: Progressing     Problem: Safety - Adult  Goal: Free from fall injury  Outcome: Progressing     Problem: Discharge Planning  Goal: Discharge to home or other facility with appropriate resources  Outcome: Progressing     Problem: Pain  Goal: My pain/discomfort is manageable  Outcome: Progressing     Problem: Safety  Goal: Patient will be injury free during hospitalization  Outcome: Progressing  Goal: I will remain free of falls  Outcome: Progressing     Problem: Daily Care  Goal: Daily care needs are met  Outcome: Progressing     Problem: Psychosocial Needs  Goal: Demonstrates ability to cope with hospitalization/illness  Outcome: Progressing  Goal: Collaborate with me, my family, and caregiver to identify my specific goals  Outcome: Progressing     Problem: Discharge Barriers  Goal: My discharge needs are met  Outcome: Progressing     The patient's goals for the shift include      The clinical goals for the shift include Pt will remain free from fall/injury

## 2024-05-19 NOTE — NURSING NOTE
Pt had a visitor leave her room and stated pt is suicidal. This nurse went to room pt found crying, asked pt if she is having suicidal thoughts and she said she is overwhelmed. Pt says she sees a counselor every 2 weeks. Pt expressed concern for current health status and finances due to health issues and missing work. Dr Raymond and oncoming nurse notified

## 2024-05-19 NOTE — PROGRESS NOTES
"Beverly Rowan is a 58 y.o. female on day 1 of admission presenting with Polyneuropathy.    Subjective   Patient seen and examined, no acute events overnight.  She was able to tolerate the steroids.  She did receive a dose of IV Ativan for her anxiety with receiving the steroids.  She was able to eat well.  She was able to sleep.  She reports slight improvement of the numbness in her pelvic region.       Objective     Physical Exam  Vitals reviewed.   Constitutional:       Appearance: Normal appearance.   HENT:      Head: Normocephalic and atraumatic.      Nose: Nose normal.   Cardiovascular:      Rate and Rhythm: Normal rate and regular rhythm.      Pulses: Normal pulses.      Heart sounds: Normal heart sounds.   Pulmonary:      Effort: Pulmonary effort is normal.      Breath sounds: Normal breath sounds. No rales.   Abdominal:      General: Abdomen is flat. Bowel sounds are normal.      Palpations: Abdomen is soft.      Tenderness: There is no abdominal tenderness.   Skin:     General: Skin is warm and dry.   Neurological:      Mental Status: She is alert and oriented to person, place, and time. Mental status is at baseline.      Comments: Pelvic region numbness bilaterally/improving   Psychiatric:         Mood and Affect: Mood normal.         Last Recorded Vitals  Blood pressure 126/60, pulse 67, temperature 36.6 °C (97.9 °F), resp. rate 16, height 1.702 m (5' 7\"), weight 59 kg (130 lb), SpO2 97%.  Intake/Output last 3 Shifts:  I/O last 3 completed shifts:  In: 913.6 (15.5 mL/kg) [P.O.:480; IV Piggyback:433.6]  Out: - (0 mL/kg)   Weight: 59 kg     Relevant Results  Scheduled medications  acyclovir, 10 mg/kg, intravenous, q8h  busPIRone, 10 mg, oral, BID  famotidine, 20 mg, oral, BID   Or  famotidine, 20 mg, intravenous, BID  levothyroxine, 100 mcg, oral, Daily  methylPREDNISolone sodium succinate (PF), 1,000 mg, intravenous, q24h  sertraline, 100 mg, oral, BID  traZODone, 50 mg, oral, Nightly      Continuous " medications     PRN medications  PRN medications: acetaminophen **OR** acetaminophen **OR** acetaminophen, acetaminophen **OR** acetaminophen **OR** acetaminophen, LORazepam, melatonin, metoclopramide **OR** metoclopramide, ondansetron ODT **OR** ondansetron, polyethylene glycol  Results from last 7 days   Lab Units 05/19/24  0638 05/18/24  0521 05/17/24  0529   WBC AUTO x10*3/uL 7.3 5.0 4.7   RBC AUTO x10*6/uL 4.44 4.25 4.11   HEMOGLOBIN g/dL 13.2 12.7 12.2     Results from last 7 days   Lab Units 05/19/24  0638 05/18/24  0521 05/17/24  0529   SODIUM mmol/L 141 142 142   POTASSIUM mmol/L 4.2 3.9 3.9   CHLORIDE mmol/L 107 107 109*   CO2 mmol/L 28 29 28   BUN mg/dL 18 13 11   CREATININE mg/dL 0.60 0.57 0.60   CALCIUM mg/dL 9.8 9.1 9.0   BILIRUBIN TOTAL mg/dL 0.4 0.4 0.5   ALT U/L 11 11 11   AST U/L 12 12 13       Assessment/Plan   Principal Problem:    Polyneuropathy  Active Problems:    Anxiety    Hypothyroidism    Peripheral polyneuropathy    HSV infection    Bilateral leg numbness  Pelvic numbness  Concern for possible aseptic meningitis  Polyneuropathy  Recent HSV-2/herpes infection completed 10 days acyclovir TID  Lower body paresthesia concerning for developing transverse myelitis     Plan:  -IV steroids/solumed 1 gram daily x 3 days  -continue IV acyclovir  -pepcid, melatonin ordered to help with possible steroid side effects, PRN ativan for severe anxiety, patient is extremely worried to be on steroids but is agreeable to try at this time  -full code  -vitals per protocol  -neuro consulted  -await CSF culture results  -ID consulted  -MRI brain/cervical spine done  -MRI spine reviewed  -home meds ordered  -AM labs  -ok to shower with assistance  -supportive care  -will follow patient closely       I spent 35 minutes in the professional and overall care of this patient.      Lita Raymond DO

## 2024-05-20 VITALS
HEIGHT: 67 IN | BODY MASS INDEX: 20.4 KG/M2 | DIASTOLIC BLOOD PRESSURE: 60 MMHG | OXYGEN SATURATION: 93 % | SYSTOLIC BLOOD PRESSURE: 118 MMHG | RESPIRATION RATE: 18 BRPM | TEMPERATURE: 97 F | HEART RATE: 71 BPM | WEIGHT: 130 LBS

## 2024-05-20 LAB
ALBUMIN SERPL BCP-MCNC: 4 G/DL (ref 3.4–5)
ALP SERPL-CCNC: 51 U/L (ref 33–110)
ALT SERPL W P-5'-P-CCNC: 11 U/L (ref 7–45)
ANION GAP SERPL CALC-SCNC: 11 MMOL/L (ref 10–20)
AST SERPL W P-5'-P-CCNC: 12 U/L (ref 9–39)
BILIRUB SERPL-MCNC: 0.4 MG/DL (ref 0–1.2)
BUN SERPL-MCNC: 22 MG/DL (ref 6–23)
CALCIUM SERPL-MCNC: 9.7 MG/DL (ref 8.6–10.3)
CHLORIDE SERPL-SCNC: 107 MMOL/L (ref 98–107)
CO2 SERPL-SCNC: 27 MMOL/L (ref 21–32)
CREAT SERPL-MCNC: 0.57 MG/DL (ref 0.5–1.05)
EGFRCR SERPLBLD CKD-EPI 2021: >90 ML/MIN/1.73M*2
ERYTHROCYTE [DISTWIDTH] IN BLOOD BY AUTOMATED COUNT: 12.4 % (ref 11.5–14.5)
GLUCOSE SERPL-MCNC: 110 MG/DL (ref 74–99)
HCT VFR BLD AUTO: 39.5 % (ref 36–46)
HGB BLD-MCNC: 13.1 G/DL (ref 12–16)
MCH RBC QN AUTO: 30.1 PG (ref 26–34)
MCHC RBC AUTO-ENTMCNC: 33.2 G/DL (ref 32–36)
MCV RBC AUTO: 91 FL (ref 80–100)
NRBC BLD-RTO: 0 /100 WBCS (ref 0–0)
PLATELET # BLD AUTO: 165 X10*3/UL (ref 150–450)
POTASSIUM SERPL-SCNC: 4.1 MMOL/L (ref 3.5–5.3)
PROT SERPL-MCNC: 6.3 G/DL (ref 6.4–8.2)
RBC # BLD AUTO: 4.35 X10*6/UL (ref 4–5.2)
SODIUM SERPL-SCNC: 141 MMOL/L (ref 136–145)
WBC # BLD AUTO: 10 X10*3/UL (ref 4.4–11.3)

## 2024-05-20 PROCEDURE — 99239 HOSP IP/OBS DSCHRG MGMT >30: CPT | Performed by: INTERNAL MEDICINE

## 2024-05-20 PROCEDURE — 80053 COMPREHEN METABOLIC PANEL: CPT | Performed by: INTERNAL MEDICINE

## 2024-05-20 PROCEDURE — 99232 SBSQ HOSP IP/OBS MODERATE 35: CPT | Performed by: PSYCHIATRY & NEUROLOGY

## 2024-05-20 PROCEDURE — 36415 COLL VENOUS BLD VENIPUNCTURE: CPT | Performed by: INTERNAL MEDICINE

## 2024-05-20 PROCEDURE — 2500000004 HC RX 250 GENERAL PHARMACY W/ HCPCS (ALT 636 FOR OP/ED): Performed by: INTERNAL MEDICINE

## 2024-05-20 PROCEDURE — 2500000006 HC RX 250 W HCPCS SELF ADMINISTERED DRUGS (ALT 637 FOR ALL PAYERS): Performed by: INTERNAL MEDICINE

## 2024-05-20 PROCEDURE — 85027 COMPLETE CBC AUTOMATED: CPT | Performed by: INTERNAL MEDICINE

## 2024-05-20 PROCEDURE — 2500000001 HC RX 250 WO HCPCS SELF ADMINISTERED DRUGS (ALT 637 FOR MEDICARE OP): Performed by: INTERNAL MEDICINE

## 2024-05-20 PROCEDURE — 2500000004 HC RX 250 GENERAL PHARMACY W/ HCPCS (ALT 636 FOR OP/ED): Performed by: STUDENT IN AN ORGANIZED HEALTH CARE EDUCATION/TRAINING PROGRAM

## 2024-05-20 RX ORDER — VALACYCLOVIR HYDROCHLORIDE 1 G/1
1000 TABLET, FILM COATED ORAL EVERY 8 HOURS SCHEDULED
Qty: 21 TABLET | Refills: 0 | Status: SHIPPED | OUTPATIENT
Start: 2024-05-20 | End: 2024-05-27

## 2024-05-20 RX ORDER — VALACYCLOVIR HYDROCHLORIDE 500 MG/1
1000 TABLET, FILM COATED ORAL EVERY 8 HOURS SCHEDULED
Status: DISCONTINUED | OUTPATIENT
Start: 2024-05-20 | End: 2024-05-20 | Stop reason: HOSPADM

## 2024-05-20 RX ADMIN — ACYCLOVIR SODIUM 590 MG: 50 INJECTION, SOLUTION INTRAVENOUS at 06:16

## 2024-05-20 RX ADMIN — DEXTROSE MONOHYDRATE 1000 MG: 5 INJECTION INTRAVENOUS at 09:47

## 2024-05-20 RX ADMIN — VALACYCLOVIR HYDROCHLORIDE 1000 MG: 500 TABLET, FILM COATED ORAL at 14:17

## 2024-05-20 RX ADMIN — FAMOTIDINE 20 MG: 20 TABLET ORAL at 08:50

## 2024-05-20 RX ADMIN — SERTRALINE HYDROCHLORIDE 100 MG: 100 TABLET ORAL at 08:50

## 2024-05-20 RX ADMIN — BUSPIRONE HYDROCHLORIDE 10 MG: 10 TABLET ORAL at 08:50

## 2024-05-20 RX ADMIN — LEVOTHYROXINE SODIUM 100 MCG: 0.1 TABLET ORAL at 06:17

## 2024-05-20 ASSESSMENT — COGNITIVE AND FUNCTIONAL STATUS - GENERAL
MOBILITY SCORE: 24
DAILY ACTIVITIY SCORE: 24

## 2024-05-20 ASSESSMENT — PAIN SCALES - GENERAL: PAINLEVEL_OUTOF10: 0 - NO PAIN

## 2024-05-20 NOTE — PROGRESS NOTES
INPATIENT PROGRESS NOTES    PATIENT NAME: Beverly Rowan    MRN: [id number]  SERVICE DATE:  5/20/2024   SERVICE TIME:  12:49 PM    SIGNATURE: Princess Rodriguez MD      SUBJECTIVE  Afebrile  No events overnight       OBJECTIVE  PHYSICAL EXAM:   Patient Vitals for the past 24 hrs:   BP Temp Temp src Pulse Resp SpO2   05/20/24 0800 118/60 36.1 °C (97 °F) Temporal 71 18 93 %   05/19/24 2357 110/67 36.1 °C (97 °F) -- 81 18 95 %   05/19/24 1956 131/70 36.4 °C (97.5 °F) -- 86 18 96 %   05/19/24 1500 112/59 36.3 °C (97.3 °F) Temporal 76 15 --       CVS RRR  Respiratory: No distress       Labs:  Lab Results   Component Value Date    WBC 10.0 05/20/2024    HGB 13.1 05/20/2024    HCT 39.5 05/20/2024    MCV 91 05/20/2024     05/20/2024     Lab Results   Component Value Date    GLUCOSE 110 (H) 05/20/2024    CALCIUM 9.7 05/20/2024     05/20/2024    K 4.1 05/20/2024    CO2 27 05/20/2024     05/20/2024    BUN 22 05/20/2024    CREATININE 0.57 05/20/2024   ESR: --  Lab Results   Component Value Date    SEDRATE 6 05/16/2024     Lab Results   Component Value Date    CRP <0.10 05/16/2024     Lab Results   Component Value Date    ALT 11 05/20/2024    AST 12 05/20/2024    ALKPHOS 51 05/20/2024    BILITOT 0.4 05/20/2024         DATA:   Diagnostic tests reviewed for today's visit:    Labs this admission reviewed  Imagings this admission reviewed  Cultures: Reviewed      ASSESSMENT :   -? Aseptic meningitis  -Recent vulvar HSV-2 infection  -Lower body paresthesia concerning for developing transverse myelitis     PLAN:  -Meningitis PCR panel, HSV 1 and 2 PCR, and CSF VDRL negative   -Possible post infection inflammatory response   -Received 3 doses of Solu-Medrol per neurology team  -Can be switched to valacyclovir 1 g 3 times daily for 7 days  -Renal function stable    The plan was discussed with the primary and pharmacy teams    Princess Rodriguez MD.   Infectious Diseases Attending            This note was partially created  "using voice recognition software and is inherently subject to errors including those of syntax and \"sound-alike\" substitutions which may escape proofreading. In such instances, original meaning may be extrapolated by contextual derivation       "

## 2024-05-20 NOTE — PROGRESS NOTES
"Beverly Rowan is a 58 y.o. female on day 2 of admission presenting with Polyneuropathy.      Subjective   Still with perineal and bilateral leg numbness.  Denies incontinence or trouble walking.  Denies pain.       Objective     Last Recorded Vitals  Blood pressure 118/60, pulse 71, temperature 36.1 °C (97 °F), temperature source Temporal, resp. rate 18, height 1.702 m (5' 7\"), weight 59 kg (130 lb), SpO2 93%.    Physical Exam  Neurological Exam  Mental Status: Awake and alert. Oriented to person, place and time. Speech was fluent to history. Naming, repetition and comprehension were intact.   CN: (CN2)-; VFF. (CN 2,3) PERRL. (CN 3,4,6) EOMI (CN 5)Facial sensation was intact to light touch bilaterally. (CN 7)Facial expression was symmetric (CN 12) Tongue protruded midline.   Motor: Normal muscle bulk and tone. Strength (confrontation testing) was (R/L) 5/5 shoulder abduction, elbow flexion/extension,  strenght, hip flexion, knee flexion/extension, ankle dorsi- and plantar flexion. There were no abnormal movements.    Sensory: Diminished to light touch in both legs.   Coordination (cerebellar function): Finger to nose and heel to shin were intact with no dysmetria.      Gait: deferred.  Relevant Results  Scheduled medications  acyclovir, 10 mg/kg, intravenous, q8h  busPIRone, 10 mg, oral, BID  famotidine, 20 mg, oral, BID   Or  famotidine, 20 mg, intravenous, BID  levothyroxine, 100 mcg, oral, Daily  sertraline, 100 mg, oral, BID  traZODone, 50 mg, oral, Nightly      Continuous medications     PRN medications  PRN medications: acetaminophen **OR** acetaminophen **OR** acetaminophen, acetaminophen **OR** acetaminophen **OR** acetaminophen, LORazepam, melatonin, metoclopramide **OR** metoclopramide, ondansetron ODT **OR** ondansetron, polyethylene glycol  Results for orders placed or performed during the hospital encounter of 05/16/24 (from the past 24 hour(s))   Comprehensive Metabolic Panel   Result Value Ref " Range    Glucose 110 (H) 74 - 99 mg/dL    Sodium 141 136 - 145 mmol/L    Potassium 4.1 3.5 - 5.3 mmol/L    Chloride 107 98 - 107 mmol/L    Bicarbonate 27 21 - 32 mmol/L    Anion Gap 11 10 - 20 mmol/L    Urea Nitrogen 22 6 - 23 mg/dL    Creatinine 0.57 0.50 - 1.05 mg/dL    eGFR >90 >60 mL/min/1.73m*2    Calcium 9.7 8.6 - 10.3 mg/dL    Albumin 4.0 3.4 - 5.0 g/dL    Alkaline Phosphatase 51 33 - 110 U/L    Total Protein 6.3 (L) 6.4 - 8.2 g/dL    AST 12 9 - 39 U/L    Bilirubin, Total 0.4 0.0 - 1.2 mg/dL    ALT 11 7 - 45 U/L   CBC   Result Value Ref Range    WBC 10.0 4.4 - 11.3 x10*3/uL    nRBC 0.0 0.0 - 0.0 /100 WBCs    RBC 4.35 4.00 - 5.20 x10*6/uL    Hemoglobin 13.1 12.0 - 16.0 g/dL    Hematocrit 39.5 36.0 - 46.0 %    MCV 91 80 - 100 fL    MCH 30.1 26.0 - 34.0 pg    MCHC 33.2 32.0 - 36.0 g/dL    RDW 12.4 11.5 - 14.5 %    Platelets 165 150 - 450 x10*3/uL     MR cervical spine w and wo IV contrast    Result Date: 5/18/2024  Interpreted By:  Marco Morel, STUDY: MR CERVICAL SPINE W AND WO IV CONTRAST;  5/17/2024 10:43 pm   INDICATION: Signs/Symptoms:c/f myelitis.   COMPARISON: CT cervical spine dated 12/20/2023. MRI thoracic and lumbar spine performed 05/16/2024.   ACCESSION NUMBER(S): [id number]   ORDERING CLINICIAN: CÉSAR CHURCH   TECHNIQUE: Multiplanar multisequence MR imaging was performed through the cervical spine prior to and following the administration of intravenous contrast. Noncontrast sagittal T1, T2, STIR, axial T1 and axial T2 weighted images were acquired through the cervical spine. Postcontrast sagittal and axial imaging obtained. A total of 12 mL Dotarem intravenous contrast was administered.   Mild motion artifact.   FINDINGS: Cord: Normal in caliber and signal. No abnormal enhancement.   Epidural fluid: None.   Alignment: Unchanged alignment. 1.5 mm anterolisthesis of T1 on T2. Trace anterolisthesis of C3 on C4. Equivocal/trace anterolisthesis of C7 on T1. Mild straightening/reversal of the  expected cervical lordosis.   Vertebral bodies: Similar degenerative height loss involving the C4-C6 endplates. Vertebral body heights are otherwise maintained.   Marrow signal: No abnormal STIR hyperintensity or abnormal osseous enhancement. C7 vertebral body hemangioma.   Intervertebral Discs: Moderate to severe degenerative disc height loss at C4-C6 with moderate degenerative disc height loss at C6-C7. Multilevel disc desiccation. No abnormal enhancement.     Degenerative change:   C1-C2:  Unremarkable.   C2-C3:  Minimal/mild bilateral facet arthropathy. No spinal canal stenosis or neural foraminal narrowing.   C3-C4:  Mild-to-moderate right facet arthropathy. Minimal disc uncovering and mild uncovertebral spurring. No spinal canal stenosis. No substantial neural foraminal narrowing   C4-C5:  Posterior disc osteophyte components. Ventral thecal sac narrowing and mild lateral recess narrowing without spinal canal stenosis minimal/mild neural foraminal narrowing.   C5-C6:  Mild ligamentum flavum thickening/infolding. Posterior disc osteophyte components with uncovertebral spurring. Mild spinal canal stenosis. Mild bilateral neural foraminal narrowing   C6-C7: Mild ligamentum flavum thickening/infolding. Mild disc bulge with uncovertebral/endplate spurring. Slight thecal sac indentation with no spinal canal stenosis. Minimal neural foraminal narrowing.   C7-T1:  Moderate right and severe left facet arthropathy. Minimal disc uncovering. No spinal canal stenosis. Minimal neural foraminal narrowing.   Soft tissues: The prevertebral and posterior paraspinous soft tissues are within normal limits.       Normal MR appearance of the cervical spinal cord with no focal cord signal abnormality, edema, or abnormal enhancement.   Similar trace degenerative spondylolisthesis. There is mild spinal canal stenosis at C5-C6 with mild bilateral neural foraminal narrowing. Otherwise no substantial spinal canal stenosis or neural  foraminal narrowing at the remaining cervical levels.   MACRO: None   Signed by: Marco Mroel 5/18/2024 2:47 PM Dictation workstation:   [id number]    MR brain w and wo IV contrast    Result Date: 5/18/2024  Interpreted By:  Marco Morel, STUDY: MR BRAIN W AND WO IV CONTRAST;  5/17/2024 10:43 pm   INDICATION: Signs/Symptoms:c/f meningitis.   COMPARISON: CT head dated 12/20/2023.   ACCESSION NUMBER(S): [id number]   ORDERING CLINICIAN: CÉSAR CHURCH   TECHNIQUE: Standard multiplanar multisequence MR imaging was performed through the brain prior to and following administration of 12 mL Dotarem intravenous contrast.   FINDINGS: Parenchyma: There is no diffusion restriction abnormality to suggest acute infarct.  No evidence of recent hemorrhage. There is no mass effect or midline shift. No abnormal parenchymal or leptomeningeal enhancement. Artifact versus trace tiny nonspecific T2/FLAIR white matter hyperintensities within the bilateral cerebral hemispheres. Otherwise normal MR appearance of the brain parenchyma.   CSF Spaces: The ventricles, sulci and basal cisterns are within normal limits for age mild senescent change. No evidence of ventriculitis. Basilar cisterns are patent.   Extra-axial spaces: No extra-axial fluid collection.   Intracranial Flow Voids: Patent appearing.   Paranasal Sinuses: Right sphenoid mucous retention cyst. Mild mucosal thickening of the inferior right maxillary paranasal sinus and left sphenoid sinus. Trace diffuse mucosal thickening of the bilateral ethmoids.   Mastoids: Clear.   Orbits: Normal.   Calvarium: No suspicious osseous marrow signal.       No acute infarct, recent hemorrhage, or intracranial mass effect. No abnormal parenchymal or leptomeningeal enhancement to indicate active intracranial infection such as meningitis, abscess, or ventriculitis.   Mild senescent change with minimal chronic small vessel ischemic disease suspected.   MACRO: None   Signed by: Marco Morel  5/18/2024 2:37 PM Dictation workstation:   [id number]    MR thoracic spine w and wo IV contrast    Result Date: 5/16/2024  Interpreted By:  Pascual Hart  and Aric Traylor STUDY: MR THORACIC SPINE W AND WO IV CONTRAST; MR LUMBAR SPINE W AND WO IV CONTRAST;  5/16/2024 4:50 pm   INDICATION: Signs/Symptoms:groin and bilateral leg numbnes starting 1 week after genital herpes, motor intact, 3+ reflexes; Signs/Symptoms:groin and bilateral leg numbnes starting 1 week after genital herpes, motor intact, 3+ reflexe.   Per EMR: 58-year-old female history of genital HSV density due to decreased sensation in the groin and bilateral lower extremities of the past week. MRI thoracic and lumbar spine to evaluate for space-occupying lesion versus epidural abscess versus transverse myelitis versus GBS   COMPARISON: None.   ACCESSION NUMBER(S): [id number]; [id number]   ORDERING CLINICIAN: CHELY MICHAELS   TECHNIQUE: Sagittal T1, T2, STIR and axial T2 and T1 weighted MR images of the thoracic and lumbar spine were obtained.   FINDINGS: MRI thoracic spine:   Alignment: Within normal limits.   Vertebrae/Intervertebral Discs: The vertebral body heights are intact.  Marrow signal is within normal limits. No increased signal on STIR sequences to suggest significant bone marrow edema. There is disc height loss most prominent at levels T5-T6, T6-T7, T7-T8 and T8-T9. There is decreased disc signal within the nucleus pulposus throughout all thoracic levels most prominent at levels T2-T3, T3-T4, T4-T5, T5-T6, T6-T7, T7-T8 and T8-T9.   Spinal cord: Preserved in signal.   T1-2: There is no significant central canal or neural foraminal stenosis. T2-3:  There is no significant central canal or neural foraminal stenosis. T3-4: There is no significant central canal or neural foraminal stenosis. T4-5: There is no significant central canal or neural foraminal stenosis. T5-6: There is no significant central canal or neural foraminal stenosis. T6-7:  There is no significant central canal or neural foraminal stenosis. T7-8: There is no significant central canal or neural foraminal stenosis. T8-9: There is no significant central canal or neural foraminal stenosis. T9-10: There is no significant central canal or neural foraminal stenosis. T10-11: There is no significant central canal or neural foraminal stenosis. T11-12: There is no significant central canal or neural foraminal stenosis.   Paraspinous Soft Tissues: Within normal limits.     MRI lumbar spine:   Alignment: The vertebral alignment is maintained.   Vertebrae/Intervertebral Discs: The vertebral bodies demonstrate expected height. Increased signal on STIR sequences to suggest significant bone marrow edema. There is a T1 and T2 hyperintense 1.2 cm lesion the level of L4 likely representing a typical vertebral hemangioma versus focal fatty infiltration. There is disc height loss most prominent at levels L4-L5 and L5-S1. There is decreased disc signal within the nucleus pulposus throughout all lumbar vertebral levels.   Conus: The lower thoracic cord appears unremarkable. The conus terminates at superior endplate of L2. No clear pathologic enhancement was identified in the lumbar spinal canal.   T12-L1:  There is no significant central canal or neural foraminal stenosis.   L1-2:  There is no significant central canal or neural foraminal stenosis.   L2-3: There is disc bulge and bilateral facet joint hypertrophy without significant central canal or neural foraminal stenosis.   L3-4: There is disc bulge, bilateral ligamentum flavum thickening and bilateral facet joint hypertrophy with facet joint edema resulting partial effacement of the bilateral lateral recesses. Constellation of findings results in moderate central canal stenosis and minimal to moderate left neural foraminal stenosis without clear compression of the exiting left L3 nerve root.   L4-5: There is disc bulge, bilateral ligamentum flavum  thickening and bilateral facet joint hypertrophy partially effacing the bilateral lateral recesses. Constellation of findings results in mild central canal stenosis, and mild right neural foraminal stenosis . There are bilateral nonobstructing synovial cysts.   L5-S1: There is disc bulge, bilateral ligamentum flavum thickening resulting in mild left and moderate right neural foraminal stenosis abutting and possibly irritating the exiting right L5 nerve root. No high-grade central canal stenosis.       MRI thoracic spine: 1. No abnormal thoracic spinal cord signal. 2. No significant central canal or neural foraminal stenosis in the thoracic spine.   MRI lumbar spine: 1. No abnormal lumbar spinal cord signal. 2. Multilevel degenerative changes in the lumbar region as above described worst at L5-S1 where there is moderate right foraminal stenosis with potential irritation of the exiting right L5 nerve root.   I personally reviewed the images/study and I agree with the findings as stated by Dr. Layton Corbett. This study was interpreted at [place], [place].   MACRO: None   Signed by: Pascual Hart 5/16/2024 6:08 PM Dictation workstation:   [id number]    MR lumbar spine w and wo IV contrast    Result Date: 5/16/2024  Interpreted By:  Pascual Hart  and Aric Traylor STUDY: MR THORACIC SPINE W AND WO IV CONTRAST; MR LUMBAR SPINE W AND WO IV CONTRAST;  5/16/2024 4:50 pm   INDICATION: Signs/Symptoms:groin and bilateral leg numbnes starting 1 week after genital herpes, motor intact, 3+ reflexes; Signs/Symptoms:groin and bilateral leg numbnes starting 1 week after genital herpes, motor intact, 3+ reflexe.   Per EMR: 58-year-old female history of genital HSV density due to decreased sensation in the groin and bilateral lower extremities of the past week. MRI thoracic and lumbar spine to evaluate for space-occupying lesion versus epidural abscess versus transverse myelitis versus GBS    COMPARISON: None.   ACCESSION NUMBER(S): [id number]; [id number]   ORDERING CLINICIAN: CHELY MICHAELS   TECHNIQUE: Sagittal T1, T2, STIR and axial T2 and T1 weighted MR images of the thoracic and lumbar spine were obtained.   FINDINGS: MRI thoracic spine:   Alignment: Within normal limits.   Vertebrae/Intervertebral Discs: The vertebral body heights are intact.  Marrow signal is within normal limits. No increased signal on STIR sequences to suggest significant bone marrow edema. There is disc height loss most prominent at levels T5-T6, T6-T7, T7-T8 and T8-T9. There is decreased disc signal within the nucleus pulposus throughout all thoracic levels most prominent at levels T2-T3, T3-T4, T4-T5, T5-T6, T6-T7, T7-T8 and T8-T9.   Spinal cord: Preserved in signal.   T1-2: There is no significant central canal or neural foraminal stenosis. T2-3:  There is no significant central canal or neural foraminal stenosis. T3-4: There is no significant central canal or neural foraminal stenosis. T4-5: There is no significant central canal or neural foraminal stenosis. T5-6: There is no significant central canal or neural foraminal stenosis. T6-7: There is no significant central canal or neural foraminal stenosis. T7-8: There is no significant central canal or neural foraminal stenosis. T8-9: There is no significant central canal or neural foraminal stenosis. T9-10: There is no significant central canal or neural foraminal stenosis. T10-11: There is no significant central canal or neural foraminal stenosis. T11-12: There is no significant central canal or neural foraminal stenosis.   Paraspinous Soft Tissues: Within normal limits.     MRI lumbar spine:   Alignment: The vertebral alignment is maintained.   Vertebrae/Intervertebral Discs: The vertebral bodies demonstrate expected height. Increased signal on STIR sequences to suggest significant bone marrow edema. There is a T1 and T2 hyperintense 1.2 cm lesion the level of L4  likely representing a typical vertebral hemangioma versus focal fatty infiltration. There is disc height loss most prominent at levels L4-L5 and L5-S1. There is decreased disc signal within the nucleus pulposus throughout all lumbar vertebral levels.   Conus: The lower thoracic cord appears unremarkable. The conus terminates at superior endplate of L2. No clear pathologic enhancement was identified in the lumbar spinal canal.   T12-L1:  There is no significant central canal or neural foraminal stenosis.   L1-2:  There is no significant central canal or neural foraminal stenosis.   L2-3: There is disc bulge and bilateral facet joint hypertrophy without significant central canal or neural foraminal stenosis.   L3-4: There is disc bulge, bilateral ligamentum flavum thickening and bilateral facet joint hypertrophy with facet joint edema resulting partial effacement of the bilateral lateral recesses. Constellation of findings results in moderate central canal stenosis and minimal to moderate left neural foraminal stenosis without clear compression of the exiting left L3 nerve root.   L4-5: There is disc bulge, bilateral ligamentum flavum thickening and bilateral facet joint hypertrophy partially effacing the bilateral lateral recesses. Constellation of findings results in mild central canal stenosis, and mild right neural foraminal stenosis . There are bilateral nonobstructing synovial cysts.   L5-S1: There is disc bulge, bilateral ligamentum flavum thickening resulting in mild left and moderate right neural foraminal stenosis abutting and possibly irritating the exiting right L5 nerve root. No high-grade central canal stenosis.       MRI thoracic spine: 1. No abnormal thoracic spinal cord signal. 2. No significant central canal or neural foraminal stenosis in the thoracic spine.   MRI lumbar spine: 1. No abnormal lumbar spinal cord signal. 2. Multilevel degenerative changes in the lumbar region as above described worst  "at L5-S1 where there is moderate right foraminal stenosis with potential irritation of the exiting right L5 nerve root.   I personally reviewed the images/study and I agree with the findings as stated by Dr. Layton Corbett. This study was interpreted at [facility], [place], [place].   MACRO: None   Signed by: Pascual Hart 5/16/2024 6:08 PM Dictation workstation:   [id number]        Assessment/Plan      Principal Problem:    Polyneuropathy  Active Problems:    Anxiety    Hypothyroidism    Peripheral polyneuropathy    HSV infection    Bilateral leg numbness  Possible HSV radiculitis and/or post infection inflammation.     MRI brain and c-spine with and without contrast- reviewed with Dr. Muller and unremarkable.   Viral CSF studies and autoimmune studies ordered and pending  No steroid taper necessary at discharge.  Acyclovir x 7 days per ID recommendation  Follow up with Dr. Vazquez after discharge.  Explained to pt that we anticipate symptoms will improve with time, but return to the ED if she has any incontinence, difficulty walking or weakness, or numbness extending to the upper body.   Case/plan discussed and pt seen with Dr. Muller.            I spent 30 minutes in the professional and overall care of this patient.      RIGO Razo-CNP    I saw and evaluated the patient.  I obtained the key portions of the history and examination.  I reviewed the residents/APNs note, discussed the patient and supervised treatment plan formulation.    Subjective:  No overnight events    Objective:  /60 (BP Location: Left arm, Patient Position: Sitting)   Pulse 71   Temp 36.1 °C (97 °F) (Temporal)   Resp 18   Ht 1.702 m (5' 7\")   Wt 59 kg (130 lb)   SpO2 93%   BMI 20.36 kg/m²     Gen: NAD  Neuro:  --HIF: A&O X 3, repetition and naming intact  --CN:  PERRLA, EOMI, VFF, no visible facial asymmetry, facial sensation intact, no tongue or palatal deviation, SCM intact  --Motor: " Moves all 4 extremities equally; no focal deficits  --Sensory: reduced sensation to light touch in both LEs  --Reflex: 2+ in UE, 3+ patella and achilles; toes down  --Cerebellum: FTN and HTS intact  --Gait: Deferred     Assessment:   Transverse Myelitis  HSV -2 Vulvar Infection    Plan:  - today is day 3/3 for Solumedrol  - anti-virals per ID  - no further neurological workup while in house  - follow up with Neurology in 4-6 weeks    Matty Muller MD  [facility]  Department of Neurology

## 2024-05-20 NOTE — DISCHARGE SUMMARY
Discharge Diagnosis  Polyneuropathy    Issues Requiring Follow-Up  Please see PCP in 1 week  Follow up with neurology dr abraham in 2-4 weeks  Take valtrex 1 gram three times per day for 7 days  no need to resume prophylactic acyclovir once complete   Please return to the ER if you have any worsening symptoms, worsened numbness, incontinence etc.       Discharge Meds     Your medication list        START taking these medications        Instructions Last Dose Given Next Dose Due   valACYclovir 1 gram tablet  Commonly known as: Valtrex      Take 1 tablet (1,000 mg) by mouth every 8 hours for 21 doses.              CONTINUE taking these medications        Instructions Last Dose Given Next Dose Due   busPIRone 10 mg tablet  Commonly known as: Buspar           diazePAM 10 mg tablet  Commonly known as: Valium      Take 1 tablet (10 mg) by mouth as needed at bedtime for anxiety.       LevoxyL 100 mcg tablet  Generic drug: levothyroxine      Take 1 tablet (100 mcg) by mouth once daily in the morning. Take before meals.       omeprazole 20 mg DR capsule  Commonly known as: PriLOSEC           sertraline 100 mg tablet  Commonly known as: Zoloft           traZODone 50 mg tablet  Commonly known as: Desyrel                  STOP taking these medications      acyclovir 400 mg tablet  Commonly known as: Zovirax                  Where to Get Your Medications        These medications were sent to [id number] - [place], [place] - [address] [facility]  [address] [facility] [address]      Phone: [phone]   valACYclovir 1 gram tablet         Test Results Pending At Discharge  Pending Labs       Order Current Status    CSF Culture/Smear Preliminary result            Hospital Course     Beverly Rowan is a 58 y.o. female presenting with chief complaints of decreased sensation in the groin, bilateral lower extremity weakness all which have been ongoing for approximately 1 week.  Her past medical history only significant  for hypothyroidism.  She states that she was recently diagnosed with genital herpes on May 1.  At the initial onset of symptoms she endorsed body aches, fever, general malaise.  However since that time she has appreciated worsening sensory deficits/numbness of her pelvic region. She denies bladder/bowel incontinence.   Patient was evaluated by her gynecologist today who recommended prompt evaluation in the emergency department.  MRI of the thoracic spine was obtained in addition to the lumbar spine.  No abnormalities on thoracic spine.  Multilevel degenerative changes noted in the lumbar region, worst at L5/S1 where there is moderate right foraminal stenosis and potential irritation of the exiting right L5 nerve root.  Patient did ultimately consent to lumbar puncture which was obtained.  HSV PCR currently pending.  WBC and RBC count on CSF analysis noted to be elevated with lymphocytic predominance.  Findings concerning for viral meningitis in the setting of recent HSV infection.  Started on IV acyclovir.  Admitted for further management.  She denies fever, chills, chest pain, shortness of breath, abdominal pain, nausea, vomiting or diarrhea at this time.     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  She was found to have HSV radiculitis and/or postinfection inflammation, lower body paresthesia concerning for transverse myelitis, ? Aseptic meningitis in the setting of recent HSV-2 infection. Neurology and ID were consulted.  She was started on IV acyclovir.  She was given 3 days of IV methylprednisolone 1 g daily.  Her labs and vitals are stable.  She states the pelvic numbness is  slowly improving.  Patient had MRI of the brain and spine which were unremarkable.   She is doing better, cleared by ID and neuro for DC, plan for DC home on valtrex/valacyclovir 1 gram TID x 7 days, no further steroids needed. Follow up with PCP and neurology at [place].    Instructions provided to the patient:  Please see PCP in 1 week  Follow up with neurology dr abraham in 2-4 weeks  Take valtrex/valacyclovir 1 gram three times per day for 7 days  no need to resume prophylactic acyclovir once complete   Please return to the ER if you have any worsening symptoms, worsened numbness, incontinence etc.     Spent>35 minutes DC patient       Pertinent Physical Exam At Time of Discharge  Vitals reviewed.   Constitutional:       Appearance: Normal appearance.   HENT:      Head: Normocephalic and atraumatic.      Nose: Nose normal.   Cardiovascular:      Rate and Rhythm: Normal rate and regular rhythm.      Pulses: Normal pulses.      Heart sounds: Normal heart sounds.   Pulmonary:      Effort: Pulmonary effort is normal.      Breath sounds: Normal breath sounds. No rales.   Abdominal:      General: Abdomen is flat. Bowel sounds are normal.      Palpations: Abdomen is soft.      Tenderness: There is no abdominal tenderness.   Skin:     General: Skin is warm and dry.   Neurological:      Mental Status: She is alert and oriented to person, place, and time. Mental status is at baseline.      Comments: Pelvic region numbness bilaterally/improving   Psychiatric:         Mood and Affect: Mood normal.        Outpatient Follow-Up  Future Appointments   Date Time Provider Department Center   5/21/2024  9:15 AM Renny Hinojosa, PT [id number] [place]   5/23/2024  8:45 AM Dexter C Manlet, PT [id number] [place]   5/28/2024  4:45 PM Dexter C Manlet, PT [id number] [place]   5/30/2024  5:30 PM Dexter C Manlet, PT [id number] [place]   6/4/2024  7:45 AM Dexter C Manlet, PT [id number] [place]   6/12/2024  5:30 PM Dexter C Manlet, PT [id number] [place]    6/13/2024  8:45 AM Dexter C Manlet, PT [id number] [place]   6/14/2024  9:15 AM Carlene Arora PA-C [id number] [place]   6/18/2024  8:30 AM Renny LUEVANO Ashofteh, PT [id number] [place]   6/20/2024  9:15 AM Renny LUEVANO Ashofteh, PT [id number] [place]   6/27/2024  5:30 PM Dexter C Manlet, PT [id number] [place]   7/3/2024  5:30 PM Dexter C Manlet, PT [id number] [place]   7/10/2024  5:30 PM Dexter C Manlet, PT [id number] [place]   7/17/2024  5:30 PM Dexter C Manlet, PT [id number] [place]   7/24/2024  5:30 PM Dexter C Manlet, PT [id number] [place]   7/31/2024  5:30 PM Dexter C Manlet, PT [id number] [place]         Lita Raymond, DO

## 2024-05-20 NOTE — CARE PLAN
The patient's goals for the shift include      The clinical goals for the shift include pt will be free from worsening numbness to groin area t/o the shift    Over the shift, the patient did not make progress toward the following goals. Barriers to progression include . Recommendations to address these barriers include .

## 2024-05-20 NOTE — PROGRESS NOTES
05/20/24 1035   Discharge Planning   Living Arrangements Alone   Assistance Needed none   Type of Residence Private residence   Home or Post Acute Services None   Patient expects to be discharged to: home   Does the patient need discharge transport arranged? No   Financial Resource Strain   How hard is it for you to pay for the very basics like food, housing, medical care, and heating? Somewhat   Housing Stability   In the last 12 months, was there a time when you were not able to pay the mortgage or rent on time? N     Met with the patient in the room, she states that she lives alone. She states that she is independent at home, has no current homegoing concerns. She does have concerns related to outpatient medical bills, encouraged her to talk to billing concerning payment plans. She plans to drive herself home on discharge.

## 2024-05-20 NOTE — DISCHARGE INSTRUCTIONS
Please see PCP in 1 week  Follow up with neurology dr abraham in 2-4 weeks  Take valtrex 1 gram three times per day for 7 days  no need to resume prophylactic acyclovir once complete   Please return to the ER if you have any worsening symptoms, worsened numbness, incontinence etc.      It was a pleasure taking care of you Beverly, I hope you feel better soon!

## 2024-05-21 ENCOUNTER — PATIENT OUTREACH (OUTPATIENT)
Dept: CARE COORDINATION | Facility: CLINIC | Age: 59
End: 2024-05-21
Payer: COMMERCIAL

## 2024-05-21 ENCOUNTER — TREATMENT (OUTPATIENT)
Dept: PHYSICAL THERAPY | Facility: CLINIC | Age: 59
End: 2024-05-21
Payer: COMMERCIAL

## 2024-05-21 ENCOUNTER — TELEPHONE (OUTPATIENT)
Dept: PRIMARY CARE | Facility: CLINIC | Age: 59
End: 2024-05-21

## 2024-05-21 DIAGNOSIS — S46.011D TRAUMATIC COMPLETE TEAR OF RIGHT ROTATOR CUFF, SUBSEQUENT ENCOUNTER: ICD-10-CM

## 2024-05-21 PROCEDURE — 97110 THERAPEUTIC EXERCISES: CPT | Mod: GP | Performed by: GENERAL ACUTE CARE HOSPITAL

## 2024-05-21 PROCEDURE — 97140 MANUAL THERAPY 1/> REGIONS: CPT | Mod: GP | Performed by: GENERAL ACUTE CARE HOSPITAL

## 2024-05-21 NOTE — TELEPHONE ENCOUNTER
----- Message from Mely Armstrnog RN sent at 5/21/2024 12:39 PM EDT -----  Regarding: TCM  Hello,      Can you please contact pt to schedule hosp  follow up within 14 days of discharge.  [place] [facility] [place]charge diagnosis:   Bilateral leg numbness   Polyneuropathy   HSV infection     Date of discharge:    5/20/24      Thank you

## 2024-05-21 NOTE — PROGRESS NOTES
[place] [facility] [place] Diagnosis: Bilateral leg numbness  Polyneuropathy  Admission Date:5/16/24   Discharge Date: 5/20/24    PCP Appointment Date: MD KIESHA Cerda/tasked to office                                         Specialist Appointment Date: SOCORRO  Hospital Encounter and Summary: Linked   See discharge assessment below for further details     Engagement  Call Start Time: 1222 (5/21/2024 12:27 PM)    Medications  Medications reviewed with patient/caregiver?: Yes (5/21/2024 12:27 PM)  Is the patient having any side effects they believe may be caused by any medication additions or changes?: No (5/21/2024 12:27 PM)  Does the patient have all medications ordered at discharge?: Yes (5/21/2024 12:27 PM)  Care Management Interventions: No intervention needed (5/21/2024 12:27 PM)  Prescription Comments: valACYclovir 1 gram tablet x7 days (5/21/2024 12:27 PM)  Is the patient taking all medications as directed (includes completed medication regime)?: Yes (5/21/2024 12:27 PM)  Care Management Interventions: Provided patient education (5/21/2024 12:27 PM)  Medication Comments: No issues obtaining medication(s) (5/21/2024 12:27 PM)    Appointments  Does the patient have a primary care provider?: Yes (MD KIESHA Cerda/tasked to office) (5/21/2024 12:27 PM)    Self Management  What is the home health agency?: denies need (5/21/2024 12:27 PM)  What Durable Medical Equipment (DME) was ordered?: denies need (5/21/2024 12:27 PM)    Patient Teaching  Does the patient have access to their discharge instructions?: Yes (5/21/2024 12:27 PM)  Care Management Interventions: Reviewed instructions with patient; Educated on MyChart (5/21/2024 12:27 PM)  What is the patient's perception of their health status since discharge?: Improving (5/21/2024 12:27 PM)  Is the patient/caregiver able to teach back the hierarchy of who to call/visit for symptoms/problems? PCP, Specialist, Home  Health nurse, Urgent Care, ED, 911: Yes (5/21/2024 12:27 PM)    Wrap Up  Wrap Up Additional Comments: This CM spoke with patient via phone. Successful transition of care outreach complete. Pt reports doing well at home since discharge. New meds reviewed. Pt denies CP and SOB. No new complaints since discharge home.Patient denies any further discharge questions/needs at this time. Emphasized that Follow up is needed after discharge to review the hospital recommendations, assess your response to your treatment.  Pt aware of my availability for non-emergent concerns. Contact info provided to patient. (5/21/2024 12:27 PM)

## 2024-05-23 ENCOUNTER — TREATMENT (OUTPATIENT)
Dept: PHYSICAL THERAPY | Facility: CLINIC | Age: 59
End: 2024-05-23
Payer: COMMERCIAL

## 2024-05-23 DIAGNOSIS — S46.011D TRAUMATIC COMPLETE TEAR OF RIGHT ROTATOR CUFF, SUBSEQUENT ENCOUNTER: Primary | ICD-10-CM

## 2024-05-23 DIAGNOSIS — G89.29 CHRONIC RIGHT SHOULDER PAIN: ICD-10-CM

## 2024-05-23 DIAGNOSIS — M75.41 IMPINGEMENT SYNDROME OF RIGHT SHOULDER: ICD-10-CM

## 2024-05-23 DIAGNOSIS — M25.511 CHRONIC RIGHT SHOULDER PAIN: ICD-10-CM

## 2024-05-23 PROCEDURE — 97110 THERAPEUTIC EXERCISES: CPT | Mod: GP

## 2024-05-23 PROCEDURE — 97140 MANUAL THERAPY 1/> REGIONS: CPT | Mod: GP

## 2024-05-23 NOTE — PROGRESS NOTES
"Physical Therapy Treatment      Patient Name: Beverly Rowan  MRN: [id number]  Today's Date: 5/23/2024  Visit #19/20  Time Calculation  Start Time: 0845  Stop Time: 0926  Time Calculation (min): 41 min    Assessment:  Beverly responded very well to manual today - noting 0/10 pain upon completion. Light AROM and resistance work today - d/t fatigue. Will continue to progress as tolerated.     Patient's response to session: Decreased pain and Increased ROM/joint mobility    Patient is likely to benefit from skilled interventions to address their impairments, and treatment that includes: manual techniques to decrease pain and improve joint/soft-tissue mobility, as well as exercises to increase strength, endurance, and neuromotor control.     Plan:  Continue per POC.    Current Problem:   1. Impingement syndrome of right shoulder        2. Traumatic complete tear of right rotator cuff, subsequent encounter  Follow Up In Physical Therapy      3. Chronic right shoulder pain            Subjective   Beverly notes increased pain along her lateal shoulder today. Adds that her glucose levels are high d/t steroid use. States that she is \"really fatigued\" since recovering from her recent hospital stay.     Pain = 4/10    Objective   Shoulder ROM (degrees)   Shoulder Flexion R/L: 140/170   Shoulder Internal Rotation R/L: 55/70   Shoulder External Rotation R/L: 80/80    Treatments:  PT Therapeutic Procedures Time Entry  Manual Therapy Time Entry: 28  Therapeutic Exercise Time Entry: 13    Therapeutic Exercise (31809):  HEP review  [email or web address]  Access Code: [email or web address]  Prepared by: Dexter Manlet  Wall slides DA -> SA  Reactive IR iso w/ yellow TB  Reactive ER iso w/ yellow TB  Standing rows w/ yellow TB  Reviewed past AA exercises to continue ROM progress    Pulleys  Finger ladder -> ecc wall slide (30)  SA wall slide w/ sheet  Row @ 90 ABD w/ red  SH ext w/ red TB    Manual Therapy (33517):  Scapular mobilizations: " Upward rotation  STM: Subscapularis, UT, rhomboids, pec minor/major  GHJ: Distraction w/ inferior and posterior glide: Grade II-III      Education and discussion on HEP and treatment regarding the benefits related to current condition, POC, pathophysiology, and precautions    *added to HEP

## 2024-05-23 NOTE — PROGRESS NOTES
Subjective   Patient ID: Beverly Rowan is a 58 y.o. female who presents for Hospital Follow-up.    [facility]    Hospital follow up-TCM compelete  Discharge Facility: [facility]   Discharge Diagnosis: Bilateral leg numbness, Polyneuropathy, HSV Infection  Admission Date:5/16/24   Discharge Date: 5/20/24  Call made within 2 days: Yes  Number of days since discharge: 4 days  Patient had BW, MRI of the cervical, lumbar, and thoracic spines, MRI of the brain and lumbar puncture performed.     She was prescribed Valacyclovir.    She was advised to follow up with PCP.     Patient admits that she is not feeling 100%. Admits that she still feels fatigue, and sometimes gets a low grade fever. Admits to still having loss of sensory.       Review of Systems  Constitutional:  no chills, no fever and no night sweats.  Eyes: no blurred vision and no eyesight problems.  ENT: no hearing loss, no nasal congestion, no hoarseness and no sore throat.  Neck: no mass (es) and no swelling.  Cardiovascular: no chest pain, no intermittent leg claudication, no lower extremity edema, no palpitation and no syncope.  Respiratory: no cough, no shortness of breath during exertion, no shortness of breath at rest and no wheezing.  Gastrointestinal: no abdominal pain, no blood in stools, no constipation, no diarrhea, no melena, no nausea, no rectal pain and no vomiting.  Genitourinary: no dysuria, no change in urinary frequency, no urinary hesitancy and no feelings of urinary urgency.  Musculoskeletal: no arthralgias, no back pain and no myalgias.  Integumentary: no new skin lesions and no rashes.  Neurological: no difficulty walking, no headache, no limb weakness, no numbness and no tingling.  Psychiatric/Behavioral: no anxiety, no depression, no anhedonia and no substance use disorders.  Endocrine: no recent weight gain and no recent weight loss.  Hematologic/Lymphatic: no tendency for easy bruising and no swollen glands    Objective  "  Physical Exam  Patient in for hospital follow-up polyneuropathy numbness in the legs from the waist down feels like her buttocks are numb also she was told it was due to single genital lesion that tested positive for herpes she is states that she has not been with anyone but her ex- in the past 32 years never had an outbreak before that she can remember and this outbreak was minor she has had an MRI from her C-spine down the lumbar spine nothing significant was found. MRI of the brain negative also.  She was advised by neurology that she will just have to wait and see what happens she says she does have a little bit of feeling return and into her legs.  Thin woman in no acute distress numbness from the waist buttock down slowly resolving questionable etiology has another few days to go of the Valtrex she will finish that follow-up with us in a month or as needed  /72   Pulse 75   Resp 18   Ht 1.702 m (5' 7\")   Wt 59.7 kg (131 lb 9.6 oz)   SpO2 98%   BMI 20.61 kg/m²     Lab Results   Component Value Date    WBC 10.0 05/20/2024    HGB 13.1 05/20/2024    HCT 39.5 05/20/2024    MCV 91 05/20/2024     05/20/2024       Assessment/Plan   Problem List Items Addressed This Visit          Infectious Diseases    HSV infection       Neuro    Polyneuropathy    Bilateral leg numbness     Other Visit Diagnoses       Hospital discharge follow-up        Therapeutic drug monitoring                "

## 2024-05-24 ENCOUNTER — OFFICE VISIT (OUTPATIENT)
Dept: PRIMARY CARE | Facility: CLINIC | Age: 59
End: 2024-05-24
Payer: COMMERCIAL

## 2024-05-24 VITALS
DIASTOLIC BLOOD PRESSURE: 72 MMHG | OXYGEN SATURATION: 98 % | BODY MASS INDEX: 20.65 KG/M2 | HEART RATE: 75 BPM | RESPIRATION RATE: 18 BRPM | HEIGHT: 67 IN | SYSTOLIC BLOOD PRESSURE: 120 MMHG | WEIGHT: 131.6 LBS

## 2024-05-24 DIAGNOSIS — Z09 HOSPITAL DISCHARGE FOLLOW-UP: ICD-10-CM

## 2024-05-24 DIAGNOSIS — Z51.81 THERAPEUTIC DRUG MONITORING: ICD-10-CM

## 2024-05-24 DIAGNOSIS — B00.9 HSV INFECTION: ICD-10-CM

## 2024-05-24 DIAGNOSIS — G62.9 POLYNEUROPATHY: ICD-10-CM

## 2024-05-24 DIAGNOSIS — R73.9 HYPERGLYCEMIA: Primary | ICD-10-CM

## 2024-05-24 DIAGNOSIS — R20.0 BILATERAL LEG NUMBNESS: ICD-10-CM

## 2024-05-24 DIAGNOSIS — D72.828 OTHER ELEVATED WHITE BLOOD CELL (WBC) COUNT: ICD-10-CM

## 2024-05-24 DIAGNOSIS — G47.9 SLEEP DIFFICULTIES: ICD-10-CM

## 2024-05-24 LAB
BACTERIA CSF CULT: NORMAL
GRAM STN SPEC: NORMAL
GRAM STN SPEC: NORMAL

## 2024-05-24 PROCEDURE — 99213 OFFICE O/P EST LOW 20 MIN: CPT | Performed by: FAMILY MEDICINE

## 2024-05-24 PROCEDURE — 80307 DRUG TEST PRSMV CHEM ANLYZR: CPT

## 2024-05-24 PROCEDURE — 80349 CANNABINOIDS NATURAL: CPT

## 2024-05-24 PROCEDURE — 82570 ASSAY OF URINE CREATININE: CPT

## 2024-05-24 PROCEDURE — 80346 BENZODIAZEPINES1-12: CPT

## 2024-05-24 PROCEDURE — 80358 DRUG SCREENING METHADONE: CPT

## 2024-05-24 PROCEDURE — 80354 DRUG SCREENING FENTANYL: CPT

## 2024-05-24 PROCEDURE — 80373 DRUG SCREENING TRAMADOL: CPT

## 2024-05-24 PROCEDURE — 80365 DRUG SCREENING OXYCODONE: CPT

## 2024-05-24 PROCEDURE — 80368 SEDATIVE HYPNOTICS: CPT

## 2024-05-24 PROCEDURE — 1036F TOBACCO NON-USER: CPT | Performed by: FAMILY MEDICINE

## 2024-05-24 PROCEDURE — 80361 OPIATES 1 OR MORE: CPT

## 2024-05-24 RX ORDER — DIAZEPAM 10 MG/1
10 TABLET ORAL EVERY 12 HOURS PRN
Qty: 180 TABLET | Refills: 0 | Status: SHIPPED | OUTPATIENT
Start: 2024-05-24 | End: 2024-08-22

## 2024-05-25 LAB
AMPHETAMINES UR QL SCN: ABNORMAL
BARBITURATES UR QL SCN: ABNORMAL
BZE UR QL SCN: ABNORMAL
CANNABINOIDS UR QL SCN: ABNORMAL
CREAT UR-MCNC: 80.5 MG/DL (ref 20–320)
PCP UR QL SCN: ABNORMAL

## 2024-05-28 ENCOUNTER — TREATMENT (OUTPATIENT)
Dept: PHYSICAL THERAPY | Facility: CLINIC | Age: 59
End: 2024-05-28
Payer: COMMERCIAL

## 2024-05-28 DIAGNOSIS — M75.41 IMPINGEMENT SYNDROME OF RIGHT SHOULDER: ICD-10-CM

## 2024-05-28 DIAGNOSIS — G89.29 CHRONIC RIGHT SHOULDER PAIN: Primary | ICD-10-CM

## 2024-05-28 DIAGNOSIS — M25.511 CHRONIC RIGHT SHOULDER PAIN: Primary | ICD-10-CM

## 2024-05-28 DIAGNOSIS — S46.011D TRAUMATIC COMPLETE TEAR OF RIGHT ROTATOR CUFF, SUBSEQUENT ENCOUNTER: ICD-10-CM

## 2024-05-28 PROCEDURE — 97110 THERAPEUTIC EXERCISES: CPT | Mod: GP

## 2024-05-28 PROCEDURE — 97140 MANUAL THERAPY 1/> REGIONS: CPT | Mod: GP

## 2024-05-28 NOTE — PROGRESS NOTES
Physical Therapy Treatment      Patient Name: Beverly Rowan  MRN: [id number]  Today's Date: 5/28/2024  Visit #20/20  Time Calculation  Start Time: 1642  Stop Time: 1722  Time Calculation (min): 40 min      Assessment:  Beverly continues to demonstrated improvements in AROM. Tolerate progression of light resistance work today. Will continue to progress as tolerated.     Patient's response to session: Increased ROM/joint mobility    Patient is likely to benefit from skilled interventions to address their impairments, and treatment that includes: manual techniques to decrease pain and improve joint/soft-tissue mobility, as well as exercises to increase strength, endurance, and neuromotor control.     Plan:  Continue per POC.    Current Problem:   1. Chronic right shoulder pain        2. Traumatic complete tear of right rotator cuff, subsequent encounter  Follow Up In Physical Therapy      3. Impingement syndrome of right shoulder            Subjective   Beverly notes continued pain along her acromion process. Feels like her motion has improved but would like the pain to decrease.     Objective   Shoulder ROM (degrees)   Shoulder Flexion R/L: 150/170   Shoulder Internal Rotation R/L: 55/70   Shoulder External Rotation R/L: 80/80    Treatments:  PT Therapeutic Procedures Time Entry  Manual Therapy Time Entry: 15  Therapeutic Exercise Time Entry: 25    Therapeutic Exercise (61334):  HEP review  [facility].[employer]  Access Code: [email or web address]  Prepared by: Dexter Manlet  Wall slides DA -> SA  Standing IR w/ yellow TB  Standing ER iso w/ yellow TB  Standing rows w/ green TB  Reviewed past AA exercises to continue ROM progress  Scapular clocks  Horizontal ABD w/ yellow TB    Pulleys  Finger ladder -> ecc wall slide (31)  SA wall slide w/ sheet  Row @ 90 ABD w/ red  SH ext w/ red TB  Scapular clocks w/ red TB  Horizontal ABD w/ yellow TB    Manual Therapy (62788):  Scapular mobilizations: Upward rotation  STM:  Subscapularis, UT, rhomboids, pec minor/major  GHJ: Distraction w/ inferior and posterior glide: Grade II-III      Education and discussion on HEP and treatment regarding the benefits related to current condition, POC, pathophysiology, and precautions    *added to HEP

## 2024-05-30 ENCOUNTER — TREATMENT (OUTPATIENT)
Dept: PHYSICAL THERAPY | Facility: CLINIC | Age: 59
End: 2024-05-30
Payer: COMMERCIAL

## 2024-05-30 DIAGNOSIS — S46.011D TRAUMATIC COMPLETE TEAR OF RIGHT ROTATOR CUFF, SUBSEQUENT ENCOUNTER: ICD-10-CM

## 2024-05-30 PROCEDURE — 97110 THERAPEUTIC EXERCISES: CPT | Mod: GP

## 2024-05-30 PROCEDURE — 97140 MANUAL THERAPY 1/> REGIONS: CPT | Mod: GP

## 2024-05-30 NOTE — PROGRESS NOTES
Physical Therapy Treatment      Patient Name: Beverly Rowan  MRN: [id number]  Today's Date: 5/30/2024  Visit #21  Time Calculation  Start Time: 1730  Stop Time: 1815  Time Calculation (min): 45 min      Assessment:  Beverly presents to therapy w/ continued c/o anterior shoulder pain that has not improved since last session. Slight improvements noted upon completion of manual - especially after thoracic mobilizations. She continues to tolerate strength progressions.     Patient's response to session: Decreased pain    Patient is likely to benefit from skilled interventions to address their impairments, and treatment that includes: manual techniques to decrease pain and improve joint/soft-tissue mobility, as well as exercises to increase strength, endurance, and neuromotor control.     Plan:  Continue per POC.    Current Problem:   1. Traumatic complete tear of right rotator cuff, subsequent encounter  Follow Up In Physical Therapy          Subjective   Beverly continues to note anterior shoulder pain.    Objective       Treatments:  PT Therapeutic Procedures Time Entry  Manual Therapy Time Entry: 20  Therapeutic Exercise Time Entry: 25    Therapeutic Exercise (66062):  HEP review  [facility].[employer]  Access Code: [email or web address]  Prepared by: Dexter Manlet  Wall slides DA -> SA  Standing IR w/ yellow TB  Standing ER iso w/ yellow TB  Standing rows w/ green TB  Reviewed past AA exercises to continue ROM progress  Scapular clocks  Horizontal ABD w/ yellow TB    Arm bike: 4'  Pulleys  SH ext w/ wand   Serratus press w/ red TB  Standing row @ 90 ABD  Standing ER w/ red TB  Standing IR w/ red TB    Manual Therapy (47978):  Scapular mobilizations: Upward rotation  STM: Subscapularis, UT, rhomboids, pec minor/major  GHJ: Distraction w/ inferior and posterior glide: Grade II-III  CPA: T2-T8: Grade III-IV       Education and discussion on HEP and treatment regarding the benefits related to current condition, POC,  pathophysiology, and precautions    *added to HEP

## 2024-05-31 LAB
1OH-MIDAZOLAM UR CFM-MCNC: <25 NG/ML
6MAM UR CFM-MCNC: <25 NG/ML
7AMINOCLONAZEPAM UR CFM-MCNC: <25 NG/ML
A-OH ALPRAZ UR CFM-MCNC: <25 NG/ML
ALPRAZ UR CFM-MCNC: <25 NG/ML
CHLORDIAZEP UR CFM-MCNC: <25 NG/ML
CLONAZEPAM UR CFM-MCNC: <25 NG/ML
CODEINE UR CFM-MCNC: <50 NG/ML
DIAZEPAM UR CFM-MCNC: <25 NG/ML
EDDP UR CFM-MCNC: <25 NG/ML
FENTANYL UR CFM-MCNC: <2.5 NG/ML
HYDROCODONE CTO UR CFM-MCNC: <25 NG/ML
HYDROMORPHONE UR CFM-MCNC: <25 NG/ML
LORAZEPAM UR CFM-MCNC: <25 NG/ML
METHADONE UR CFM-MCNC: <25 NG/ML
MIDAZOLAM UR CFM-MCNC: <25 NG/ML
MORPHINE UR CFM-MCNC: <50 NG/ML
NORDIAZEPAM UR CFM-MCNC: 403 NG/ML
NORFENTANYL UR CFM-MCNC: <2.5 NG/ML
NORHYDROCODONE UR CFM-MCNC: <25 NG/ML
NOROXYCODONE UR CFM-MCNC: <25 NG/ML
NORTRAMADOL UR-MCNC: <50 NG/ML
OXAZEPAM UR CFM-MCNC: 933 NG/ML
OXYCODONE UR CFM-MCNC: <25 NG/ML
OXYMORPHONE UR CFM-MCNC: <25 NG/ML
TEMAZEPAM UR CFM-MCNC: >1000 NG/ML
TRAMADOL UR CFM-MCNC: <50 NG/ML
ZOLPIDEM UR CFM-MCNC: <25 NG/ML
ZOLPIDEM UR-MCNC: <25 NG/ML

## 2024-06-01 LAB — CARBOXYTHC UR-MCNC: 22 NG/ML

## 2024-06-03 ENCOUNTER — TELEPHONE (OUTPATIENT)
Dept: PRIMARY CARE | Facility: CLINIC | Age: 59
End: 2024-06-03
Payer: COMMERCIAL

## 2024-06-03 DIAGNOSIS — E03.8 OTHER SPECIFIED HYPOTHYROIDISM: ICD-10-CM

## 2024-06-03 NOTE — TELEPHONE ENCOUNTER
----- Message from Beverly Rowan sent at 6/3/2024  2:54 PM EDT -----  Regarding: LAB RESULTS  Contact: [phone]  Just one more thing. When I followed up with Dr Malone on May 24 after being discharged from [facility] he ordered some labs for me which I will be doing this week.  Can he add to that list to check my TSH  and T4 please? Thank you!! Beverly

## 2024-06-03 NOTE — TELEPHONE ENCOUNTER
[employer] message sent.    Chad Malone MD  [facility] [id number] [facility] Clinical Support Staff46 minutes ago (12:38 PM)       May be a very small amount of THC in the CBD product it is not enough to show chronic use

## 2024-06-03 NOTE — TELEPHONE ENCOUNTER
Please review UDS done on 5-24-24, and advise. She has been using CBD cream.      Beverly [id number] [id number] Clinical Support Staff (supporting Chad Malone MD)2 days ago       Dr. Malone- what is up with my urinalysis results?? I only take Valium at night if I need it. I do take 50 mg of Trazadone but as far as other opioids, benzos, amphetamines or hydrocodone I do NOT take.     You  Beverly Rowan1 hour ago (8:51 AM)       Yves Paul,  Have you used any CBD products such as vapes, gummies, lotions, rubs?       Beverly ZHAO [id number] [address] Clinical Support Staff (supporting Chad Malone MD)4 minutes ago (10:41 AM)     Ahhh yes…. I am using a CBD/THC cream on my shoulder I had surgery on.

## 2024-06-04 ENCOUNTER — TELEPHONE (OUTPATIENT)
Dept: PRIMARY CARE | Facility: CLINIC | Age: 59
End: 2024-06-04

## 2024-06-04 ENCOUNTER — TREATMENT (OUTPATIENT)
Dept: PHYSICAL THERAPY | Facility: CLINIC | Age: 59
End: 2024-06-04
Payer: COMMERCIAL

## 2024-06-04 ENCOUNTER — PATIENT OUTREACH (OUTPATIENT)
Dept: CARE COORDINATION | Facility: CLINIC | Age: 59
End: 2024-06-04

## 2024-06-04 DIAGNOSIS — G89.29 CHRONIC RIGHT SHOULDER PAIN: ICD-10-CM

## 2024-06-04 DIAGNOSIS — M75.41 IMPINGEMENT SYNDROME OF RIGHT SHOULDER: ICD-10-CM

## 2024-06-04 DIAGNOSIS — S46.011D TRAUMATIC COMPLETE TEAR OF RIGHT ROTATOR CUFF, SUBSEQUENT ENCOUNTER: ICD-10-CM

## 2024-06-04 DIAGNOSIS — M25.511 CHRONIC RIGHT SHOULDER PAIN: ICD-10-CM

## 2024-06-04 PROCEDURE — 97110 THERAPEUTIC EXERCISES: CPT | Mod: GP

## 2024-06-04 PROCEDURE — 97140 MANUAL THERAPY 1/> REGIONS: CPT | Mod: GP

## 2024-06-04 NOTE — PROGRESS NOTES
Physical Therapy Treatment      Patient Name: Beverly Rowan  MRN: [id number]  Today's Date: 6/4/2024  Visit #22  Time Calculation  Start Time: 0745  Stop Time: 0830  Time Calculation (min): 45 min    Assessment:  Beverly presents to therapy w/ mild improvements in shoulder flexion. Decreased ROM observed during ER @ 90 ABD - improving w/ manual. TTP and increased tone along R subscapularis. Improved tolerance to resistance work today w/ only slight UT compensations (better w/ verbal cueing).     Patient's response to session: No change in pain    Patient is likely to benefit from skilled interventions to address their impairments, and treatment that includes: manual techniques to decrease pain and improve joint/soft-tissue mobility, as well as exercises to increase strength, endurance, and neuromotor control.     Plan:  Continue per POC.    Current Problem:   1. Traumatic complete tear of right rotator cuff, subsequent encounter  Follow Up In Physical Therapy      2. Impingement syndrome of right shoulder  Follow Up In Physical Therapy      3. Chronic right shoulder pain  Follow Up In Physical Therapy          Subjective   Beverly notes that her shoulder is feeling better since last session. Stretching seems to help her anterior shoulder pain.     Objective   Shoulder ROM (degrees)   Shoulder Flexion R/L: 155/170   Shoulder Internal Rotation R/L: 55/70   Shoulder External Rotation R/L: 65/80    Treatments:  PT Therapeutic Procedures Time Entry  Manual Therapy Time Entry: 20  Therapeutic Exercise Time Entry: 25    Therapeutic Exercise (13008):  HEP review  [email or web address]  Access Code: [email or web address]  Prepared by: Dexter Clements  Wall slides DA -> SA  Standing IR w/ yellow TB  Standing ER iso w/ yellow TB  Standing rows w/ green TB  Reviewed past AA exercises to continue ROM progress  Scapular clocks  Horizontal ABD w/ yellow TB    Arm bike: 4' @ lvl 2  SA wall slides: Varying directions  DA wall slides w/ red  TB  Standing IR @ 45 degrees ABD: yellow TB  Standing ER @ 45 degrees ABD: yellow TB  Standing row @ 90 degrees ABD: Red TB  Scapular clocks w/ red TB  Serratus press at wall    Manual Therapy (29887):  GHJ: Posterior and inferior mobilizations: Grade III-IV  STM: Subscapularis, UT, lat pin and stretch  Shoulder Ranging: All directions  Manual sleeper stretch      Education and discussion on HEP and treatment regarding the benefits related to current condition, POC, pathophysiology, and precautions    *added to HEP

## 2024-06-04 NOTE — TELEPHONE ENCOUNTER
----- Message from Beverly LUEVANOAnthony Rowan sent at 6/4/2024  4:49 PM EDT -----  Regarding: PAPERWORK  Contact: [phone]  I’ve been  since April 2022 and on February 22 had my name legally changed to Beverly Rowan from [place]. I’m at work and will forward the legal document. My job at [employer] is Patient . Human Resources needs the paperwork bc they put my hospitalization as an ADA leave bc I have not been with the company for a year and any time someone is off for 3 or more days they require this paperwork to be filled out. I know- it’s a pain in the ass. Let me know if you need anything else. They basically want to see I got clearance to go back to work on May 27th with no restrictions. Thank you and I apologize for the hassle……. Beverly

## 2024-06-04 NOTE — PROGRESS NOTES
Call regarding appt. with PCP on 5/24/24 after hospitalization.  At time of outreach call the patient feels as if their condition has improved since last visit. Patient in good spirits. Patient trying to schedule a follow up with neurology. No new medications. Pt states she is still having occasional tingling and numbness but not as severe as what took her to the hospital last month.  Reviewed the PCP appointment with the pt and addressed any questions or concerns.

## 2024-06-06 ENCOUNTER — LAB (OUTPATIENT)
Dept: LAB | Facility: LAB | Age: 59
End: 2024-06-06
Payer: COMMERCIAL

## 2024-06-06 DIAGNOSIS — D72.828 OTHER ELEVATED WHITE BLOOD CELL (WBC) COUNT: ICD-10-CM

## 2024-06-06 DIAGNOSIS — E03.8 OTHER SPECIFIED HYPOTHYROIDISM: ICD-10-CM

## 2024-06-06 DIAGNOSIS — R73.9 HYPERGLYCEMIA: ICD-10-CM

## 2024-06-06 LAB
ANION GAP SERPL CALC-SCNC: 13 MMOL/L (ref 10–20)
BASOPHILS # BLD AUTO: 0.03 X10*3/UL (ref 0–0.1)
BASOPHILS NFR BLD AUTO: 0.7 %
BUN SERPL-MCNC: 14 MG/DL (ref 6–23)
CALCIUM SERPL-MCNC: 9.7 MG/DL (ref 8.6–10.6)
CHLORIDE SERPL-SCNC: 106 MMOL/L (ref 98–107)
CO2 SERPL-SCNC: 28 MMOL/L (ref 21–32)
CREAT SERPL-MCNC: 0.77 MG/DL (ref 0.5–1.05)
EGFRCR SERPLBLD CKD-EPI 2021: 89 ML/MIN/1.73M*2
EOSINOPHIL # BLD AUTO: 0.03 X10*3/UL (ref 0–0.7)
EOSINOPHIL NFR BLD AUTO: 0.7 %
ERYTHROCYTE [DISTWIDTH] IN BLOOD BY AUTOMATED COUNT: 13.5 % (ref 11.5–14.5)
GLUCOSE SERPL-MCNC: 96 MG/DL (ref 74–99)
HCT VFR BLD AUTO: 40 % (ref 36–46)
HGB BLD-MCNC: 13.6 G/DL (ref 12–16)
IMM GRANULOCYTES # BLD AUTO: 0.01 X10*3/UL (ref 0–0.7)
IMM GRANULOCYTES NFR BLD AUTO: 0.2 % (ref 0–0.9)
LYMPHOCYTES # BLD AUTO: 0.94 X10*3/UL (ref 1.2–4.8)
LYMPHOCYTES NFR BLD AUTO: 23.4 %
MCH RBC QN AUTO: 30.4 PG (ref 26–34)
MCHC RBC AUTO-ENTMCNC: 34 G/DL (ref 32–36)
MCV RBC AUTO: 90 FL (ref 80–100)
MONOCYTES # BLD AUTO: 0.26 X10*3/UL (ref 0.1–1)
MONOCYTES NFR BLD AUTO: 6.5 %
NEUTROPHILS # BLD AUTO: 2.74 X10*3/UL (ref 1.2–7.7)
NEUTROPHILS NFR BLD AUTO: 68.5 %
NRBC BLD-RTO: 0 /100 WBCS (ref 0–0)
PLATELET # BLD AUTO: 193 X10*3/UL (ref 150–450)
POTASSIUM SERPL-SCNC: 4.1 MMOL/L (ref 3.5–5.3)
RBC # BLD AUTO: 4.47 X10*6/UL (ref 4–5.2)
SODIUM SERPL-SCNC: 143 MMOL/L (ref 136–145)
T4 FREE SERPL-MCNC: 1.29 NG/DL (ref 0.78–1.48)
TSH SERPL-ACNC: 0.89 MIU/L (ref 0.44–3.98)
WBC # BLD AUTO: 4 X10*3/UL (ref 4.4–11.3)

## 2024-06-06 PROCEDURE — 36415 COLL VENOUS BLD VENIPUNCTURE: CPT

## 2024-06-06 PROCEDURE — 84439 ASSAY OF FREE THYROXINE: CPT

## 2024-06-06 PROCEDURE — 80048 BASIC METABOLIC PNL TOTAL CA: CPT

## 2024-06-06 PROCEDURE — 85025 COMPLETE CBC W/AUTO DIFF WBC: CPT

## 2024-06-06 PROCEDURE — 84443 ASSAY THYROID STIM HORMONE: CPT

## 2024-06-07 ENCOUNTER — TELEPHONE (OUTPATIENT)
Dept: PRIMARY CARE | Facility: CLINIC | Age: 59
End: 2024-06-07
Payer: COMMERCIAL

## 2024-06-07 NOTE — TELEPHONE ENCOUNTER
----- Message from Beverly Rowan sent at 6/7/2024  3:49 PM EDT -----  Regarding: PAPERWORK  Contact: [phone]  Attached is the file with paperwork. Please let me know you received it. Thank you!  Beverly

## 2024-06-07 NOTE — TELEPHONE ENCOUNTER
----- Message from Beverly Rowan sent at 6/7/2024  1:29 PM EDT -----  Regarding: PAPERWORK  Contact: [phone]  Drivers license with legal name change

## 2024-06-07 NOTE — TELEPHONE ENCOUNTER
MyChart letter sent    ----- Message from Chad Malone MD sent at 6/6/2024  8:32 PM EDT -----  Labs are normal.

## 2024-06-07 NOTE — TELEPHONE ENCOUNTER
----- Message from Beverly Rowan sent at 6/7/2024  3:49 PM EDT -----  Regarding: PAPERWORK  Contact: [phone]  Attached is the file with paperwork. Please let me know you received it. Thank you!  Beverly

## 2024-06-10 ENCOUNTER — TELEPHONE (OUTPATIENT)
Dept: PRIMARY CARE | Facility: CLINIC | Age: 59
End: 2024-06-10
Payer: COMMERCIAL

## 2024-06-10 NOTE — TELEPHONE ENCOUNTER
My chart message sent to patient. Need updated paperwork with it either being blank or adding on her legal name to the paperwork and sending it back to us.

## 2024-06-10 NOTE — LETTER
Becky 10, 2024     Patient: Beverly Rowan   YOB: 1965   Date of Visit: 6/10/2024       To Whom It May Concern:    Beverly Rowan was seen in my office on 05/24/2024. Patient was seen in my office after she was in the hospital from 05/18/2024 to 05/20/2024 due to Polyneuropathy. Beverly has been released to return to work on 05/27/2024 without restrictions.    If you have any questions or concerns, please don't hesitate to call.       Sincerely,         Chad Malone MD

## 2024-06-11 ENCOUNTER — LAB REQUISITION (OUTPATIENT)
Dept: LAB | Facility: HOSPITAL | Age: 59
End: 2024-06-11
Payer: COMMERCIAL

## 2024-06-11 DIAGNOSIS — N39.0 URINARY TRACT INFECTION, SITE NOT SPECIFIED: ICD-10-CM

## 2024-06-11 DIAGNOSIS — R31.0 GROSS HEMATURIA: ICD-10-CM

## 2024-06-11 PROCEDURE — 87086 URINE CULTURE/COLONY COUNT: CPT

## 2024-06-11 PROCEDURE — 87186 SC STD MICRODIL/AGAR DIL: CPT

## 2024-06-12 ENCOUNTER — APPOINTMENT (OUTPATIENT)
Dept: PHYSICAL THERAPY | Facility: CLINIC | Age: 59
End: 2024-06-12
Payer: COMMERCIAL

## 2024-06-13 ENCOUNTER — TREATMENT (OUTPATIENT)
Dept: PHYSICAL THERAPY | Facility: CLINIC | Age: 59
End: 2024-06-13
Payer: COMMERCIAL

## 2024-06-13 ENCOUNTER — APPOINTMENT (OUTPATIENT)
Dept: PHYSICAL THERAPY | Facility: CLINIC | Age: 59
End: 2024-06-13
Payer: COMMERCIAL

## 2024-06-13 DIAGNOSIS — M75.41 IMPINGEMENT SYNDROME OF RIGHT SHOULDER: ICD-10-CM

## 2024-06-13 DIAGNOSIS — G89.29 CHRONIC RIGHT SHOULDER PAIN: ICD-10-CM

## 2024-06-13 DIAGNOSIS — S46.011D TRAUMATIC COMPLETE TEAR OF RIGHT ROTATOR CUFF, SUBSEQUENT ENCOUNTER: ICD-10-CM

## 2024-06-13 DIAGNOSIS — M25.511 CHRONIC RIGHT SHOULDER PAIN: ICD-10-CM

## 2024-06-13 PROCEDURE — 97140 MANUAL THERAPY 1/> REGIONS: CPT | Mod: GP

## 2024-06-13 PROCEDURE — 97110 THERAPEUTIC EXERCISES: CPT | Mod: GP

## 2024-06-13 NOTE — PROGRESS NOTES
Physical Therapy Treatment      Patient Name: Beverly Rowan  MRN: [id number]  Today's Date: 6/13/2024  Visit #23  Time Calculation  Start Time: 1645  Stop Time: 1725  Time Calculation (min): 40 min    Insurance:  Visit Limit: 2/20 Authorized     Assessment:  Beverly presents to therapy w/ continued improvements in PROM. Still demonstrates impaired functional Ext+IR. Demonstrated good form and tolerance to resistance progressions for HEP. Follows up with orthopedics tomorrow for more guidance on lingering anterior shoulder pain.     Patient's response to session: Increased ROM/joint mobility    Patient is likely to benefit from skilled interventions to address their impairments, and treatment that includes: manual techniques to decrease pain and improve joint/soft-tissue mobility, as well as exercises to increase strength, endurance, and neuromotor control.     Plan:  Continue per POC.    Current Problem:   1. Traumatic complete tear of right rotator cuff, subsequent encounter  Follow Up In Physical Therapy      2. Impingement syndrome of right shoulder  Follow Up In Physical Therapy      3. Chronic right shoulder pain  Follow Up In Physical Therapy          Subjective   Beverly continues to note pain along her anterior shoulder (near acromion). Feels like she is making progress otherwise. Follow up with orthopedics tomorrow.     Objective   Shoulder Flexion R/L: 165/170  Shoulder Internal Rotation R/L: 60/70  Shoulder External Rotation R/L: 80/80    Treatments:  PT Therapeutic Procedures Time Entry  Manual Therapy Time Entry: 15  Therapeutic Exercise Time Entry: 25    Therapeutic Exercise (11221):  HEP reviewed: Completed in full: Handout provided  *Wall slides w/ yellow band loop  *Horizontal ABD w/ yellow TB  *Sleeper stretch at wall  *Active IR w/ green TB  *Active ER w/ green TB    UBE: 4' @ lvl 2  SA wall slide  90/90 iso IR walkouts  90/90 iso ER walkouts    Manual Therapy (42514):  GHJ: Posterior and inferior  mobilizations: Grade III-IV  STM: Subscapularis, UT, lat pin and stretch  Shoulder Ranging: All directions  Manual sleeper stretch      Education and discussion on HEP and treatment regarding the benefits related to current condition, POC, pathophysiology, and precautions    *added to HEP

## 2024-06-14 ENCOUNTER — OFFICE VISIT (OUTPATIENT)
Dept: ORTHOPEDIC SURGERY | Facility: CLINIC | Age: 59
End: 2024-06-14
Payer: COMMERCIAL

## 2024-06-14 DIAGNOSIS — S46.011A TRAUMATIC COMPLETE TEAR OF RIGHT ROTATOR CUFF, INITIAL ENCOUNTER: Primary | ICD-10-CM

## 2024-06-14 LAB — BACTERIA UR CULT: ABNORMAL

## 2024-06-14 PROCEDURE — 1036F TOBACCO NON-USER: CPT | Performed by: PHYSICIAN ASSISTANT

## 2024-06-14 PROCEDURE — 99213 OFFICE O/P EST LOW 20 MIN: CPT | Performed by: PHYSICIAN ASSISTANT

## 2024-06-18 ENCOUNTER — APPOINTMENT (OUTPATIENT)
Dept: PHYSICAL THERAPY | Facility: CLINIC | Age: 59
End: 2024-06-18
Payer: COMMERCIAL

## 2024-06-20 ENCOUNTER — TREATMENT (OUTPATIENT)
Dept: PHYSICAL THERAPY | Facility: CLINIC | Age: 59
End: 2024-06-20
Payer: COMMERCIAL

## 2024-06-20 ENCOUNTER — PATIENT OUTREACH (OUTPATIENT)
Dept: CARE COORDINATION | Facility: CLINIC | Age: 59
End: 2024-06-20

## 2024-06-20 DIAGNOSIS — M25.511 CHRONIC RIGHT SHOULDER PAIN: ICD-10-CM

## 2024-06-20 DIAGNOSIS — M75.41 IMPINGEMENT SYNDROME OF RIGHT SHOULDER: ICD-10-CM

## 2024-06-20 DIAGNOSIS — S46.011D TRAUMATIC COMPLETE TEAR OF RIGHT ROTATOR CUFF, SUBSEQUENT ENCOUNTER: ICD-10-CM

## 2024-06-20 DIAGNOSIS — G89.29 CHRONIC RIGHT SHOULDER PAIN: ICD-10-CM

## 2024-06-20 PROCEDURE — 97110 THERAPEUTIC EXERCISES: CPT | Mod: GP | Performed by: GENERAL ACUTE CARE HOSPITAL

## 2024-06-20 PROCEDURE — 97140 MANUAL THERAPY 1/> REGIONS: CPT | Mod: GP | Performed by: GENERAL ACUTE CARE HOSPITAL

## 2024-06-20 NOTE — PROGRESS NOTES
Patient Name: Beverly Rowan  MRN: [id number]  Today's Date: 6/20/2024     Current Problem:  1. Traumatic complete tear of right rotator cuff, subsequent encounter  Follow Up In Physical Therapy      2. Impingement syndrome of right shoulder  Follow Up In Physical Therapy      3. Chronic right shoulder pain  Follow Up In Physical Therapy          Visit 24/    Subjective:  Change in pain/ pain level: 0/10    Patient comments: unable to perform functional IR    Treatment:    Therapeutic exercise (19355):    UBE   Access Code: [email or web address]  URL: [email or web address]  Date: 06/20/2024  Prepared by: Renny Hinojosa    Exercises  - Sleeper Stretch (Mirrored)  - 1-2 x daily - 7 x weekly - 2 reps - 30 hold  - Doorway Pec Stretch at 90 Degrees Abduction  - 1-2 x daily - 7 x weekly - 20 reps  - Shoulder External Rotation and Scapular Retraction with Resistance  - 1-2 x daily - 7 x weekly - 20 reps  - Standing Low Trap Setting with Resistance at Wall  - 1-2 x daily - 7 x weekly - 20 reps  - Standing Shoulder Internal Rotation Stretch with Towel  - 1-2 x daily - 7 x weekly - 20 reps  Manual Therapy (73391):  Glenohumeral mobilization A-P Inferior Lateral grade 3/4 Multiple planes  Stretch elevation, IR, ER, cross body  Scapular mobilization all planes/ manual scapular stabilization  TPR subscapularis, Upper Trapezius, Pectoralis major, Pectoralis minor, latissimus dorsi    Assessment:  Patient notes improved pain levels resultant from activities in therapy session. Improved tissue quality noted as well as body mechanics demonstrated after cueing and corrections. Patient continues to require active therapy to progress functional capabilities with decreasing pain levels and improving mechanics with functional activities including progression of Home exercise program. Tolerance to today's treatment was good. Muscle fatigue noted.  Patient appears motivated and compliant this date, demonstrating a working  understanding of principals instructed and home program.    Plan:  Progress with functional strength as tolerated with respect to tissue healing. Manual therapy PRN to improve/maintain ROM, prevent adhesion, reduce pain.

## 2024-06-21 ENCOUNTER — TELEPHONE (OUTPATIENT)
Dept: NEUROLOGY | Facility: CLINIC | Age: 59
End: 2024-06-21
Payer: COMMERCIAL

## 2024-06-21 ENCOUNTER — TELEPHONE (OUTPATIENT)
Dept: PRIMARY CARE | Facility: CLINIC | Age: 59
End: 2024-06-21
Payer: COMMERCIAL

## 2024-06-21 NOTE — TELEPHONE ENCOUNTER
----- Message from Mely Armstrong RN sent at 6/20/2024  3:58 PM EDT -----  Regarding: TCM  Hello,    I spoke with Beverly for her one month TCM follow up call and she expressed concern that she cannot get in to see neurology until December. She is willing to see another neurologist but I believe she would need a referral for this. Would Dr Malone be able to put in a referral for Beverly? You can reach out to her with the answer about the neuro referral. Thank you so much for your help with this.    Mely

## 2024-06-21 NOTE — TELEPHONE ENCOUNTER
SPOKE WITH DR. DELACRUZ'S OFFICE - THEY PUT HER ON AN URGENT WAIT LIST AND THEY ARE ALSO GOING TO BE GETTING A MESSAGE TO THE DOCTOR DIRECTLY TO SEE IF SHE WOULD NEED TO SEE HER SOONER THAN DECEMBER.      LMOM FOR PATIENT WITH THE ABOVE INFORMATION - ANY QUESTIONS PLEASE GIVE US A CALL BACK.

## 2024-06-21 NOTE — TELEPHONE ENCOUNTER
Nancy from [facility] scheduling called to schedule appt since it was on her discharge notes to be seen in 2-4 wks for polyneuropathy.

## 2024-06-27 ENCOUNTER — TREATMENT (OUTPATIENT)
Dept: PHYSICAL THERAPY | Facility: CLINIC | Age: 59
End: 2024-06-27
Payer: COMMERCIAL

## 2024-06-27 DIAGNOSIS — M25.511 CHRONIC RIGHT SHOULDER PAIN: ICD-10-CM

## 2024-06-27 DIAGNOSIS — G89.29 CHRONIC RIGHT SHOULDER PAIN: ICD-10-CM

## 2024-06-27 DIAGNOSIS — M75.41 IMPINGEMENT SYNDROME OF RIGHT SHOULDER: ICD-10-CM

## 2024-06-27 DIAGNOSIS — S46.011D TRAUMATIC COMPLETE TEAR OF RIGHT ROTATOR CUFF, SUBSEQUENT ENCOUNTER: ICD-10-CM

## 2024-06-27 PROCEDURE — 97110 THERAPEUTIC EXERCISES: CPT | Mod: GP

## 2024-06-27 NOTE — PROGRESS NOTES
Physical Therapy Treatment      Patient Name: Beverly Rowan  MRN: [id number]  Today's Date: 6/27/2024  Visit #25  Time Calculation  Start Time: 1530  Stop Time: 1612  Time Calculation (min): 42 min    Insurance:  Visit Limit: 4/20 Authorized     Assessment:  Beverly presents to therapy w/ moderate improvements in her functional IR. She was able to tolerate progression of resistance work w/ slight to no discomfort. Will continue to progress as tolerated.     Patient's response to session: Decreased pain and Increased ROM/joint mobility    Patient is likely to benefit from skilled interventions to address their impairments, and treatment that includes: manual techniques to decrease pain and improve joint/soft-tissue mobility, as well as exercises to increase strength, endurance, and neuromotor control.     Plan:  Continue per POC.    Current Problem:   1. Traumatic complete tear of right rotator cuff, subsequent encounter  Follow Up In Physical Therapy      2. Impingement syndrome of right shoulder  Follow Up In Physical Therapy      3. Chronic right shoulder pain  Follow Up In Physical Therapy          Subjective   Beverly notes that her shoulder has been feeling better since she started using Voltaren. Notes improvements in functional IR    Objective   R IR AROM: T11    Treatments:  PT Therapeutic Procedures Time Entry  Therapeutic Exercise Time Entry: 42    Therapeutic Exercise (28781):  HEP review  - Sleeper Stretch (Mirrored)  - 1-2 x daily - 7 x weekly - 2 reps - 30 hold  - Doorway Pec Stretch at 90 Degrees Abduction  - 1-2 x daily - 7 x weekly - 20 reps  - Shoulder External Rotation and Scapular Retraction with Resistance  - 1-2 x daily - 7 x weekly - 20 reps  - Standing Low Trap Setting with Resistance at Wall  - 1-2 x daily - 7 x weekly - 20 reps  - Standing Shoulder Internal Rotation Stretch with Towel  - 1-2 x daily - 7 x weekly - 20 reps    90/90 ER walkouts blue tubing  90/90 IR walkouts blue tubing  Wall  slide -> low trap activation red TB  Resisted SA wall slide yellow TB  Modified plank -> shoulder taps -> middle trap activation 1#  UBE: 4' @ lvl 2      Education and discussion on HEP and treatment regarding the benefits related to current condition, POC, pathophysiology, and precautions    *added to HEP

## 2024-07-03 ENCOUNTER — APPOINTMENT (OUTPATIENT)
Dept: PHYSICAL THERAPY | Facility: CLINIC | Age: 59
End: 2024-07-03
Payer: COMMERCIAL

## 2024-07-09 ENCOUNTER — LAB REQUISITION (OUTPATIENT)
Dept: LAB | Facility: HOSPITAL | Age: 59
End: 2024-07-09
Payer: COMMERCIAL

## 2024-07-09 DIAGNOSIS — N39.0 URINARY TRACT INFECTION, SITE NOT SPECIFIED: ICD-10-CM

## 2024-07-09 PROCEDURE — 87186 SC STD MICRODIL/AGAR DIL: CPT

## 2024-07-09 PROCEDURE — 87086 URINE CULTURE/COLONY COUNT: CPT

## 2024-07-10 ENCOUNTER — TREATMENT (OUTPATIENT)
Dept: PHYSICAL THERAPY | Facility: CLINIC | Age: 59
End: 2024-07-10
Payer: COMMERCIAL

## 2024-07-10 DIAGNOSIS — M75.41 IMPINGEMENT SYNDROME OF RIGHT SHOULDER: ICD-10-CM

## 2024-07-10 DIAGNOSIS — G89.29 CHRONIC RIGHT SHOULDER PAIN: ICD-10-CM

## 2024-07-10 DIAGNOSIS — S46.011D TRAUMATIC COMPLETE TEAR OF RIGHT ROTATOR CUFF, SUBSEQUENT ENCOUNTER: Primary | ICD-10-CM

## 2024-07-10 DIAGNOSIS — M25.511 CHRONIC RIGHT SHOULDER PAIN: ICD-10-CM

## 2024-07-10 PROCEDURE — 97110 THERAPEUTIC EXERCISES: CPT | Mod: GP

## 2024-07-10 NOTE — PROGRESS NOTES
Physical Therapy Treatment      Patient Name: Beverly Rowan  MRN: [id number]  Today's Date: 7/10/2024  Visit #26       Insurance:  Visit Limit: 5/20    Assessment:  Beverly was able to tolerate resistance progressions today. Introduced light plyometric work today - w/ decreased motor control demonstrated on L. Reviewed HEP w/ handout provided.     Patient's response to session: Increase motor control    Patient is likely to benefit from skilled interventions to address their impairments, and treatment that includes: manual techniques to decrease pain and improve joint/soft-tissue mobility, as well as exercises to increase strength, endurance, and neuromotor control.     Plan:  Continue per POC.    Current Problem:   1. Traumatic complete tear of right rotator cuff, subsequent encounter        2. Impingement syndrome of right shoulder        3. Chronic right shoulder pain            Subjective   Beverly notes that her motion continues to improve. Would like to review home exercises to ensure proper form.     Objective       Treatments:       Therapeutic Exercise (32651):  HEP reviewed and completed in full  [facility].[employer]  Access Code: [email or web address]  -Sleeper stretch  -Standing IR towel  -Standing low trap setting  -90/90 ER walkout  -90/90 IR walkout  -Serratus press  -Shoulder taps    SA ROW red TB  UBE: 4' lvl 3  Prone Y  Prone T  Bodyblade: Neutral ER/IR, ABD, Flexion      Education and discussion on HEP and treatment regarding the benefits related to current condition, POC, pathophysiology, and precautions    *added to HEP

## 2024-07-11 ENCOUNTER — APPOINTMENT (OUTPATIENT)
Dept: NEUROLOGY | Facility: CLINIC | Age: 59
End: 2024-07-11
Payer: COMMERCIAL

## 2024-07-11 VITALS
HEIGHT: 67 IN | HEART RATE: 70 BPM | BODY MASS INDEX: 21.82 KG/M2 | SYSTOLIC BLOOD PRESSURE: 120 MMHG | TEMPERATURE: 97.3 F | DIASTOLIC BLOOD PRESSURE: 70 MMHG | WEIGHT: 139 LBS

## 2024-07-11 DIAGNOSIS — M54.2 CERVICALGIA: ICD-10-CM

## 2024-07-11 DIAGNOSIS — R20.0 BILATERAL LEG NUMBNESS: ICD-10-CM

## 2024-07-11 DIAGNOSIS — M54.12 CERVICAL RADICULOPATHY: ICD-10-CM

## 2024-07-11 DIAGNOSIS — G37.3 TRANSVERSE MYELITIS (MULTI): Primary | ICD-10-CM

## 2024-07-11 PROBLEM — G62.9 POLYNEUROPATHY: Status: RESOLVED | Noted: 2024-05-16 | Resolved: 2024-07-11

## 2024-07-11 PROBLEM — G62.9 PERIPHERAL POLYNEUROPATHY: Status: RESOLVED | Noted: 2024-05-16 | Resolved: 2024-07-11

## 2024-07-11 PROCEDURE — 1036F TOBACCO NON-USER: CPT | Performed by: STUDENT IN AN ORGANIZED HEALTH CARE EDUCATION/TRAINING PROGRAM

## 2024-07-11 PROCEDURE — 99214 OFFICE O/P EST MOD 30 MIN: CPT | Performed by: STUDENT IN AN ORGANIZED HEALTH CARE EDUCATION/TRAINING PROGRAM

## 2024-07-11 RX ORDER — NITROFURANTOIN 25; 75 MG/1; MG/1
100 CAPSULE ORAL EVERY 12 HOURS SCHEDULED
COMMUNITY
Start: 2024-06-11

## 2024-07-11 ASSESSMENT — LIFESTYLE VARIABLES
AUDIT-C TOTAL SCORE: 2
SKIP TO QUESTIONS 9-10: 0
HOW OFTEN DO YOU HAVE SIX OR MORE DRINKS ON ONE OCCASION: LESS THAN MONTHLY
HOW OFTEN DO YOU HAVE A DRINK CONTAINING ALCOHOL: MONTHLY OR LESS
HOW MANY STANDARD DRINKS CONTAINING ALCOHOL DO YOU HAVE ON A TYPICAL DAY: 1 OR 2

## 2024-07-11 ASSESSMENT — PATIENT HEALTH QUESTIONNAIRE - PHQ9
2. FEELING DOWN, DEPRESSED OR HOPELESS: NOT AT ALL
1. LITTLE INTEREST OR PLEASURE IN DOING THINGS: NOT AT ALL
SUM OF ALL RESPONSES TO PHQ9 QUESTIONS 1 & 2: 0

## 2024-07-11 NOTE — PROGRESS NOTES
Subjective     Beverly Rowan is a 58 y.o. year old female for hospital follow-up.     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 Treated with IV solumedrol 1g x3 days and IV acyclovir. She initially developed genital herpes infection April, treated with acyclovir with resolution of the lesions. Two weeks later, developed bilateral lower extremity numbness and saddle anesthesia. On exam, she had diffuse hyperreflexia and possible meningismus, but no other focal weakness or sensory loss/sensory level. CSF showed a lymphocytic pleocytosis (WBC 56,), HSV PCR negative. MRI  brain, cervical, thoracic and lumbar spine with and without contrast showed no myelitis. HIV, syphilis were also negative.     She followed up with her GYN. She had HSV-1 and 2 IgG positive which indicated she has had past infections. She flexes her head down and feels electrical current going down her back and legs primarily on the left side. She describes Lhermitte's phenomenon. The genital sensation is much better but still has some numbness on the calf muscles or backs of her legs. She has occasional swelling in her calves bilaterally.     Patient Active Problem List   Diagnosis    Impingement syndrome of right shoulder    Primary osteoarthritis of right knee    Traumatic rotator cuff tear, right, initial encounter    Chronic right shoulder pain    Anxiety    Hypothyroidism    HSV infection    Bilateral leg numbness    Transverse myelitis (Multi)       Objective   Neurological Exam  Mental Status  Awake, alert and oriented to person, place and time. Speech is normal.    Cranial Nerves  CN III, IV, VI: Extraocular movements intact bilaterally.  CN VII: Full and symmetric facial movement.  CN VIII: Hearing is normal.    Motor   Strength is 5/5 throughout all four extremities.    Sensory  Light touch is normal in upper and lower extremities.     Reflexes                                            Right                       Left  Brachioradialis                    2+                         2+  Biceps                                 2+                         2+  Triceps                                2+                         2+  Patellar                                2+                         2+  Achilles                                2+                         2+  Right Plantar: downgoing  Left Plantar: downgoing    Gait  Casual gait is normal including stance, stride, and arm swing.    Physical Exam  Eyes:      Extraocular Movements: Extraocular movements intact.   Neurological:      Motor: Motor strength is normal.     Deep Tendon Reflexes:      Reflex Scores:       Tricep reflexes are 2+ on the right side and 2+ on the left side.       Bicep reflexes are 2+ on the right side and 2+ on the left side.       Brachioradialis reflexes are 2+ on the right side and 2+ on the left side.       Patellar reflexes are 2+ on the right side and 2+ on the left side.       Achilles reflexes are 2+ on the right side and 2+ on the left side.  Psychiatric:         Speech: Speech normal.         Assessment/Plan   Diagnoses and all orders for this visit:  Transverse myelitis (Multi)  -     XR cervical spine complete 4-5 views; Future  Bilateral leg numbness  Cervicalgia  -     XR cervical spine complete 4-5 views; Future  Cervical radiculopathy  -     XR cervical spine complete 4-5 views; Future    Transverse myelitis post HSV-2 genital infection: s/p IV steroids x3 days and IV acylovir. Anticipate she will continue to improve clinically   Cervicalgia, Lhermitte's phenomenon: unclear if this is a cervical radiculopathy vs residual Lhermitt'es from the TM. Will check neck flexion/extension xrays.     Follow-up in 6 months

## 2024-07-12 LAB — BACTERIA UR CULT: ABNORMAL

## 2024-07-17 ENCOUNTER — APPOINTMENT (OUTPATIENT)
Dept: PHYSICAL THERAPY | Facility: CLINIC | Age: 59
End: 2024-07-17
Payer: COMMERCIAL

## 2024-07-24 ENCOUNTER — TREATMENT (OUTPATIENT)
Dept: PHYSICAL THERAPY | Facility: CLINIC | Age: 59
End: 2024-07-24
Payer: COMMERCIAL

## 2024-07-24 DIAGNOSIS — M75.41 IMPINGEMENT SYNDROME OF RIGHT SHOULDER: ICD-10-CM

## 2024-07-24 DIAGNOSIS — G89.29 CHRONIC RIGHT SHOULDER PAIN: ICD-10-CM

## 2024-07-24 DIAGNOSIS — S46.011D TRAUMATIC COMPLETE TEAR OF RIGHT ROTATOR CUFF, SUBSEQUENT ENCOUNTER: ICD-10-CM

## 2024-07-24 DIAGNOSIS — M25.511 CHRONIC RIGHT SHOULDER PAIN: ICD-10-CM

## 2024-07-24 PROCEDURE — 97110 THERAPEUTIC EXERCISES: CPT | Mod: GP

## 2024-07-24 NOTE — PROGRESS NOTES
Physical Therapy Treatment      Patient Name: Beverly Rowan  MRN: [id number]  Today's Date: 7/24/2024  Visit #27  Time Calculation  Start Time: 1725  Stop Time: 1805  Time Calculation (min): 40 min    Insurance:  Visit Limit: 6/20    Assessment:  Beverly continues to make great strides in her shoulder strength and control. No changes in pain noted during today's session. She will follow up with Ortho next week.    Patient's response to session: Increased ROM/joint mobility and Increase motor control    Patient is likely to benefit from skilled interventions to address their impairments, and treatment that includes: manual techniques to decrease pain and improve joint/soft-tissue mobility, as well as exercises to increase strength, endurance, and neuromotor control.     Plan:  Continue per POC.    Current Problem:   1. Traumatic complete tear of right rotator cuff, subsequent encounter  Follow Up In Physical Therapy      2. Impingement syndrome of right shoulder  Follow Up In Physical Therapy      3. Chronic right shoulder pain  Follow Up In Physical Therapy          Subjective   Beverly notes that she continues to make improvements. Pain is minimal.     Objective       Treatments:  PT Therapeutic Procedures Time Entry  Therapeutic Exercise Time Entry: 40    Therapeutic Exercise (89864):  HEP review  [facility]  Access Code: [email or web address]  -Sleeper stretch  -Standing IR towel  -Standing low trap setting  -90/90 ER walkout  -90/90 IR walkout  -Serratus press  -Shoulder taps     SA ROW red TB  Cable IR: 5lbs  Cable ER: 2.5 lbs  UBE: 4' lvl 3  Quadruped Y w/ 3lbs  Quadruped T w/ 3 lbs  Wall slides -> lift off w/ red TB  Bodyblade: Neutral ER/IR, ABD, Flexion  ER perturbations w/ red TB  IR perturbations w/ red TB  ABD perturbations w/ red TB    Education and discussion on HEP and treatment regarding the benefits related to current condition, POC, pathophysiology, and precautions    *added to HEP

## 2024-07-26 ENCOUNTER — OFFICE VISIT (OUTPATIENT)
Dept: ORTHOPEDIC SURGERY | Facility: CLINIC | Age: 59
End: 2024-07-26
Payer: COMMERCIAL

## 2024-07-26 DIAGNOSIS — S46.011A TRAUMATIC COMPLETE TEAR OF RIGHT ROTATOR CUFF, INITIAL ENCOUNTER: ICD-10-CM

## 2024-07-26 PROCEDURE — 99213 OFFICE O/P EST LOW 20 MIN: CPT | Performed by: ORTHOPAEDIC SURGERY

## 2024-07-26 NOTE — PROGRESS NOTES
History: Beverly is here for her right shoulder.  She is 5 months from an arthroscopic repair of a large rotator cuff tear.  She feels she is made excellent progress over the last month.  She continues to work hard in therapy.    Past medical history: Multiple  Medications: Multiple  Allergies: No known drug allergies    Please refer to the intake H&P regarding the patient's review of systems, family history and social history as was done today    HEENT: Normal  Lungs: Clear to auscultation  Heart: RRR  Abdomen: Soft, nontender  Skin: clear  Extremity: She has full overemotional the right shoulder with minimal Scap elevation.  She has no significant tenderness around the AC joint or shoulder with palpation.  She has 5-5 abduction and supraspinatus strength.  She has 5 out of 5 rotational strength.  She does lack about 3 levels of internal rotation.  No numbness.  Contralateral exam is normal for strength, motion, stability and neurovascular assessment.    Radiographs: Previous x-rays show stable alignment of the glenohumeral joint    Assessment: Stable right rotator cuff repair 5 months out    Plan: She had a fairly large tear and has made excellent progress over the last month.  She still lacking a bit of rotation but understands this is always the last thing to return.  She would like to continue doing therapy for another month given her improvement.  She can then convert to home program.  I would be happy to see her back with any problems or questions.  All questions were answered today with the patient.    This note was generated with voice recognition software and may contain grammatical errors.

## 2024-07-31 ENCOUNTER — TREATMENT (OUTPATIENT)
Dept: PHYSICAL THERAPY | Facility: CLINIC | Age: 59
End: 2024-07-31
Payer: COMMERCIAL

## 2024-07-31 DIAGNOSIS — M25.511 CHRONIC RIGHT SHOULDER PAIN: ICD-10-CM

## 2024-07-31 DIAGNOSIS — M75.41 IMPINGEMENT SYNDROME OF RIGHT SHOULDER: ICD-10-CM

## 2024-07-31 DIAGNOSIS — G89.29 CHRONIC RIGHT SHOULDER PAIN: ICD-10-CM

## 2024-07-31 DIAGNOSIS — S46.011D TRAUMATIC COMPLETE TEAR OF RIGHT ROTATOR CUFF, SUBSEQUENT ENCOUNTER: ICD-10-CM

## 2024-07-31 PROCEDURE — 97110 THERAPEUTIC EXERCISES: CPT | Mod: GP

## 2024-07-31 NOTE — PROGRESS NOTES
"Physical Therapy Treatment      Patient Name: Beverly Rowan  MRN: [id number]  Today's Date: 7/31/2024  Visit #28  Time Calculation  Start Time: 1735  Stop Time: 1815  Time Calculation (min): 40 min    Insurance:  Visit Limit: 7/20    Assessment:  Beverly continues to demonstrate maintained AROM and improved motor control. Mild fatigue noted towards end of session. Reviewed HEP.     Patient's response to session: Decreased pain, Increased ROM/joint mobility, and Increase motor control    Patient is likely to benefit from skilled interventions to address their impairments, and treatment that includes: manual techniques to decrease pain and improve joint/soft-tissue mobility, as well as exercises to increase strength, endurance, and neuromotor control.     Plan:  Continue per POC.    Current Problem:   1. Traumatic complete tear of right rotator cuff, subsequent encounter  Follow Up In Physical Therapy      2. Impingement syndrome of right shoulder  Follow Up In Physical Therapy      3. Chronic right shoulder pain  Follow Up In Physical Therapy          Subjective   Beverly notes that she is \"almost back.\" Would like to continue through August and see where she is at.     Objective       Treatments:  PT Therapeutic Procedures Time Entry  Therapeutic Exercise Time Entry: 40    Therapeutic Exercise (91974):  HEP review  HEP review  [facility]  Access Code: [email or web address]  -Sleeper stretch  -Standing IR towel  -Standing low trap setting  -90/90 ER walkout  -90/90 IR walkout  -Serratus press  -Shoulder taps    SA ROW red TB  UBE: 4' lvl 3  Band loop perturbations + flexion  Quadruped Y w/ 3lbs  Quadruped T w/ 3 lbs  Wall slides -> lift off w/ red TB  Bodyblade: Neutral ER/IR, ABD, Flexion  ER perturbations w/ red TB  IR perturbations w/ red TB  ABD perturbations w/ red TB    Education and discussion on HEP and treatment regarding the benefits related to current condition, POC, pathophysiology, and " precautions    *added to HEP

## 2024-08-03 ENCOUNTER — HOSPITAL ENCOUNTER (OUTPATIENT)
Dept: RADIOLOGY | Facility: HOSPITAL | Age: 59
Discharge: HOME | End: 2024-08-03
Payer: COMMERCIAL

## 2024-08-03 DIAGNOSIS — M54.12 CERVICAL RADICULOPATHY: ICD-10-CM

## 2024-08-03 DIAGNOSIS — M54.2 CERVICALGIA: ICD-10-CM

## 2024-08-03 DIAGNOSIS — G37.3 TRANSVERSE MYELITIS (MULTI): ICD-10-CM

## 2024-08-03 PROCEDURE — 72052 X-RAY EXAM NECK SPINE 6/>VWS: CPT | Performed by: RADIOLOGY

## 2024-08-03 PROCEDURE — 72052 X-RAY EXAM NECK SPINE 6/>VWS: CPT

## 2024-08-16 ENCOUNTER — PATIENT OUTREACH (OUTPATIENT)
Dept: CARE COORDINATION | Facility: CLINIC | Age: 59
End: 2024-08-16
Payer: COMMERCIAL

## 2024-08-16 NOTE — PROGRESS NOTES
Final call. Called and spoke with patient to address any questions or concerns regarding hospitalization.   Patient reports their condition has improved.   Patient encouraged to keep my contact information in case any needs arise.

## 2024-09-04 ENCOUNTER — TREATMENT (OUTPATIENT)
Dept: PHYSICAL THERAPY | Facility: CLINIC | Age: 59
End: 2024-09-04
Payer: COMMERCIAL

## 2024-09-04 DIAGNOSIS — G89.29 CHRONIC RIGHT SHOULDER PAIN: ICD-10-CM

## 2024-09-04 DIAGNOSIS — S46.011D TRAUMATIC COMPLETE TEAR OF RIGHT ROTATOR CUFF, SUBSEQUENT ENCOUNTER: ICD-10-CM

## 2024-09-04 DIAGNOSIS — M75.41 IMPINGEMENT SYNDROME OF RIGHT SHOULDER: ICD-10-CM

## 2024-09-04 DIAGNOSIS — M25.511 CHRONIC RIGHT SHOULDER PAIN: ICD-10-CM

## 2024-09-04 PROCEDURE — 97110 THERAPEUTIC EXERCISES: CPT | Mod: GP

## 2024-09-09 ENCOUNTER — TREATMENT (OUTPATIENT)
Dept: PHYSICAL THERAPY | Facility: CLINIC | Age: 59
End: 2024-09-09
Payer: COMMERCIAL

## 2024-09-09 DIAGNOSIS — G89.29 CHRONIC RIGHT SHOULDER PAIN: ICD-10-CM

## 2024-09-09 DIAGNOSIS — S46.011D TRAUMATIC COMPLETE TEAR OF RIGHT ROTATOR CUFF, SUBSEQUENT ENCOUNTER: ICD-10-CM

## 2024-09-09 DIAGNOSIS — M75.41 IMPINGEMENT SYNDROME OF RIGHT SHOULDER: ICD-10-CM

## 2024-09-09 DIAGNOSIS — M25.511 CHRONIC RIGHT SHOULDER PAIN: ICD-10-CM

## 2024-09-09 PROCEDURE — 97110 THERAPEUTIC EXERCISES: CPT | Mod: GP | Performed by: GENERAL ACUTE CARE HOSPITAL

## 2024-09-09 PROCEDURE — 97140 MANUAL THERAPY 1/> REGIONS: CPT | Mod: GP | Performed by: GENERAL ACUTE CARE HOSPITAL

## 2024-09-09 NOTE — PROGRESS NOTES
Patient Name: Beverly Rowan  MRN: [id number]  Today's Date: 9/9/2024     Current Problem:  1. Traumatic complete tear of right rotator cuff, subsequent encounter  Follow Up In Physical Therapy      2. Impingement syndrome of right shoulder  Follow Up In Physical Therapy      3. Chronic right shoulder pain  Follow Up In Physical Therapy          Visit 9/20    Subjective:  Change in pain/ pain level: 0/10  Change in function: nearly full function, end ROM ER and IR tight and uncoordianted     Treatment:    Therapeutic exercise (37966):  HEP review education and discharge instruction  Manual Therapy (67420):  Glenohumeral mobilization A-P Inferior Lateral grade 3/4 Multiple planes  Stretch elevation, IR, ER, cross body  Scapular mobilization all planes/ manual scapular stabilization  TPR subscapularis, Upper Trapezius, Pectoralis major, Pectoralis minor, latissimus dorsi  Thoracic P-A Rib P-a       Assessment:  Reviewed with patient discharge HEP, patient demonstrated [place] through teach back of exercises. My email and phone number was provided to patient to follow up if necessary. (Phone: [phone], email [email or web address]. )Patient has achieved most of pertinent goals and wishes to be discharged at this time.    Plan:  Discharge

## 2024-10-16 ENCOUNTER — OFFICE VISIT (OUTPATIENT)
Dept: PRIMARY CARE | Facility: CLINIC | Age: 59
End: 2024-10-16
Payer: COMMERCIAL

## 2024-10-16 VITALS
DIASTOLIC BLOOD PRESSURE: 70 MMHG | HEIGHT: 67 IN | HEART RATE: 75 BPM | OXYGEN SATURATION: 95 % | WEIGHT: 143 LBS | TEMPERATURE: 97.7 F | BODY MASS INDEX: 22.44 KG/M2 | SYSTOLIC BLOOD PRESSURE: 100 MMHG

## 2024-10-16 DIAGNOSIS — R53.83 OTHER FATIGUE: ICD-10-CM

## 2024-10-16 DIAGNOSIS — Z78.0 MENOPAUSE: Primary | ICD-10-CM

## 2024-10-16 DIAGNOSIS — E03.8 OTHER SPECIFIED HYPOTHYROIDISM: ICD-10-CM

## 2024-10-16 PROCEDURE — 99213 OFFICE O/P EST LOW 20 MIN: CPT | Performed by: FAMILY MEDICINE

## 2024-10-16 PROCEDURE — 1036F TOBACCO NON-USER: CPT | Performed by: FAMILY MEDICINE

## 2024-10-16 PROCEDURE — 3008F BODY MASS INDEX DOCD: CPT | Performed by: FAMILY MEDICINE

## 2024-10-16 NOTE — PROGRESS NOTES
Subjective   Patient ID: Beverly Rowan is a 58 y.o. female who presents for Hypothyroidism and Anxiety.  HPI    Patient presents in the office today to discuss having her thyroid checked. Patient just had labs for this completed on 6/6/24. Patient states she has been feeling tired,noticed weight gain, always cold, constipation, increased stress . Ongoing X 5 months.     Patient declines the flu vaccine today. Will get it at work    Anxiety follow up  Taking the Diazepam  Working well    80% effective   Denies any side effects   [place]AR[place] reviewed today   CSA 5/24/24  Last took last night      Review of Systems  Constitutional:  no chills, no fever and no night sweats.  Eyes: no blurred vision and no eyesight problems.  ENT: no hearing loss, no nasal congestion, no hoarseness and no sore throat.  Neck: no mass (es) and no swelling.  Cardiovascular: no chest pain, no intermittent leg claudication, no lower extremity edema, no palpitation and no syncope.  Respiratory: no cough, no shortness of breath during exertion, no shortness of breath at rest and no wheezing.  Gastrointestinal: no abdominal pain, no blood in stools, no constipation, no diarrhea, no melena, no nausea, no rectal pain and no vomiting.  Genitourinary: no dysuria, no change in urinary frequency, no urinary hesitancy and no feelings of urinary urgency.  Musculoskeletal: no arthralgias, no back pain and no myalgias.  Integumentary: no new skin lesions and no rashes.  Neurological: no difficulty walking, no headache, no limb weakness, no numbness and no tingling.  Psychiatric/Behavioral: no anxiety, no depression, no anhedonia and no substance use disorders.  Endocrine: no recent weight gain and no recent weight loss.  Hematologic/Lymphatic: no tendency for easy bruising and no swollen glands    Objective   Physical Exam  Patient in for follow-up increased fatigue wants her thyroid rechecked also more menopausal symptoms would like to get her hormone  "levels checked.  Increase stress at work short staffed and ex- still causing some issues with her children are upset overall she thinks that will settle down.  No new complaints physical exam well-developed well-nourished woman in no acute distress physical exam today's office visit constitutional alert and oriented x3.    Head is atraumatic HEENT is within normal limits.    Neck supple no masses full range of motion.    Thyroid is normal in size no thyromegaly there is no carotid bruits.    Pulmonary exam shows clear to auscultation no respiratory distress.    Cardiovascular shows no murmur rub or gallop.  Regular rate and rhythm.    Abdominal exam soft nontender no hepatosplenomegaly or masses normal bowel sounds no rebound no guarding.    Musculoskeletal exam no joint pain no muscle pain full range of motion.    Psych exam normal mood and affect.    Dermatologic exam no skin lesions no rash no blemishes.    Neuro exam is no focal deficits.  Normal exam.    Extremities no edema normal pulses normal capillary refill.  Anxiety mental status history of mild anxiety depression stable with medication overall no new physical complaints.  /70   Pulse 75   Temp 36.5 °C (97.7 °F) (Temporal)   Ht 1.702 m (5' 7\")   Wt 64.9 kg (143 lb)   SpO2 95%   BMI 22.40 kg/m²     Lab Results   Component Value Date    WBC 4.0 (L) 06/06/2024    HGB 13.6 06/06/2024    HCT 40.0 06/06/2024    MCV 90 06/06/2024     06/06/2024       Assessment/Plan plan get labs follow-up we have the results if doing well routine recheck 3 months.  Problem List Items Addressed This Visit          Endocrine/Metabolic    Hypothyroidism    Relevant Orders    Thyroxine, Free    Thyroid Stimulating Hormone     Other Visit Diagnoses       Menopause    -  Primary    Relevant Orders    FSH    Luteinizing hormone    Other fatigue        Relevant Orders    Vitamin B12    Vitamin D 25-Hydroxy,Total (for eval of Vitamin D levels)          "

## 2024-10-17 ENCOUNTER — APPOINTMENT (OUTPATIENT)
Dept: PRIMARY CARE | Facility: CLINIC | Age: 59
End: 2024-10-17
Payer: COMMERCIAL

## 2024-10-18 ENCOUNTER — TELEPHONE (OUTPATIENT)
Dept: PRIMARY CARE | Facility: CLINIC | Age: 59
End: 2024-10-18

## 2024-10-18 ENCOUNTER — TELEPHONE (OUTPATIENT)
Dept: PRIMARY CARE | Facility: CLINIC | Age: 59
End: 2024-10-18
Payer: COMMERCIAL

## 2024-10-18 ENCOUNTER — LAB (OUTPATIENT)
Dept: LAB | Facility: LAB | Age: 59
End: 2024-10-18
Payer: COMMERCIAL

## 2024-10-18 DIAGNOSIS — Z78.0 MENOPAUSE: ICD-10-CM

## 2024-10-18 DIAGNOSIS — R53.83 OTHER FATIGUE: ICD-10-CM

## 2024-10-18 DIAGNOSIS — E03.8 OTHER SPECIFIED HYPOTHYROIDISM: ICD-10-CM

## 2024-10-18 PROCEDURE — 84439 ASSAY OF FREE THYROXINE: CPT

## 2024-10-18 PROCEDURE — 82607 VITAMIN B-12: CPT

## 2024-10-18 PROCEDURE — 83001 ASSAY OF GONADOTROPIN (FSH): CPT

## 2024-10-18 PROCEDURE — 82306 VITAMIN D 25 HYDROXY: CPT

## 2024-10-18 PROCEDURE — 83002 ASSAY OF GONADOTROPIN (LH): CPT

## 2024-10-18 PROCEDURE — 36415 COLL VENOUS BLD VENIPUNCTURE: CPT

## 2024-10-18 PROCEDURE — 84443 ASSAY THYROID STIM HORMONE: CPT

## 2024-10-18 NOTE — TELEPHONE ENCOUNTER
CALLED IN STATING SHE FOUND A CHIROPRACTOR WHO MAY BE ABLE TO HELP HER WITH THE SEVERE NECK PAIN AND NUMBNESS BUT HE NEEDS A LETTER FROM HER PCP SAYING IT IS OK TO TREAT HER.      I SAW THAT THIS WASN'T AN ISSUE THAT OUR OFFICE HAS REALLY TREATED BUT SHE SAID THAT THE DOCTOR IS AWARE OF THE ONGOING ISSUES AND ITS NOT A REFERRAL JUST A LETTER FOR TREATMENT.    SHE WILL ALSO BE CALLING HER NEUROLOGIST TO GET A LETTER FROM THEM TOO    PLEASE ADVISE  REQUESTING LETTER SENT THROUGH [employer]

## 2024-10-18 NOTE — LETTER
October 18, 2024     Beverly Rowan    Patient: Beverly Rowan   YOB: 1965   Date of Visit: 10/18/2024       Dear Dr. Beverly Rowan:    I am referring my patient, Beverly Rowan, to you for evaluation for her chronic back and neck pain.    I appreciate your assistance in her care and look forward to your findings and recommendations.         Sincerely,                           Chad Malone MD        CC: No Recipients

## 2024-10-19 LAB
25(OH)D3 SERPL-MCNC: 86 NG/ML (ref 30–100)
FSH SERPL-ACNC: 69.4 IU/L
LH SERPL-ACNC: 37.8 IU/L
T4 FREE SERPL-MCNC: 1.11 NG/DL (ref 0.78–1.48)
TSH SERPL-ACNC: 0.86 MIU/L (ref 0.44–3.98)
VIT B12 SERPL-MCNC: >2000 PG/ML (ref 211–911)

## 2024-10-22 ENCOUNTER — TELEPHONE (OUTPATIENT)
Dept: PRIMARY CARE | Facility: CLINIC | Age: 59
End: 2024-10-22
Payer: COMMERCIAL

## 2024-10-22 NOTE — TELEPHONE ENCOUNTER
----- Message from Chad Malone sent at 10/21/2024  5:32 PM EDT -----  Labs show patient in menopausal range with ovarian hormones B12 level elevated if she is taking B12 supplements she needs to stop

## 2024-11-01 DIAGNOSIS — E03.9 HYPOTHYROIDISM, UNSPECIFIED TYPE: ICD-10-CM

## 2024-11-01 RX ORDER — LEVOTHYROXINE SODIUM 100 UG/1
TABLET ORAL
Qty: 90 TABLET | Refills: 1 | Status: SHIPPED | OUTPATIENT
Start: 2024-11-01

## 2024-12-18 ENCOUNTER — APPOINTMENT (OUTPATIENT)
Dept: NEUROLOGY | Facility: CLINIC | Age: 59
End: 2024-12-18
Payer: COMMERCIAL

## 2024-12-18 VITALS
TEMPERATURE: 97.1 F | BODY MASS INDEX: 22.4 KG/M2 | HEART RATE: 79 BPM | DIASTOLIC BLOOD PRESSURE: 76 MMHG | SYSTOLIC BLOOD PRESSURE: 124 MMHG | HEIGHT: 67 IN

## 2024-12-18 DIAGNOSIS — G62.9 SMALL FIBER NEUROPATHY: ICD-10-CM

## 2024-12-18 DIAGNOSIS — R20.0 BILATERAL LEG NUMBNESS: ICD-10-CM

## 2024-12-18 DIAGNOSIS — M54.2 CERVICALGIA: ICD-10-CM

## 2024-12-18 DIAGNOSIS — G37.3 TRANSVERSE MYELITIS (MULTI): Primary | ICD-10-CM

## 2024-12-18 PROCEDURE — 99214 OFFICE O/P EST MOD 30 MIN: CPT | Performed by: STUDENT IN AN ORGANIZED HEALTH CARE EDUCATION/TRAINING PROGRAM

## 2024-12-18 PROCEDURE — 1036F TOBACCO NON-USER: CPT | Performed by: STUDENT IN AN ORGANIZED HEALTH CARE EDUCATION/TRAINING PROGRAM

## 2024-12-18 ASSESSMENT — LIFESTYLE VARIABLES
HOW MANY STANDARD DRINKS CONTAINING ALCOHOL DO YOU HAVE ON A TYPICAL DAY: 1 OR 2
SKIP TO QUESTIONS 9-10: 0
AUDIT-C TOTAL SCORE: 3
HOW OFTEN DO YOU HAVE SIX OR MORE DRINKS ON ONE OCCASION: LESS THAN MONTHLY
HOW OFTEN DO YOU HAVE A DRINK CONTAINING ALCOHOL: 2-4 TIMES A MONTH

## 2024-12-18 ASSESSMENT — PATIENT HEALTH QUESTIONNAIRE - PHQ9
1. LITTLE INTEREST OR PLEASURE IN DOING THINGS: NOT AT ALL
SUM OF ALL RESPONSES TO PHQ9 QUESTIONS 1 & 2: 0
2. FEELING DOWN, DEPRESSED OR HOPELESS: NOT AT ALL

## 2024-12-18 NOTE — PROGRESS NOTES
[facility]   Referring: No ref. provider found  PCP: Chad Malone MD  Chief Complaint   Patient presents with    Hospital Follow-up     Numbness in lower extremity        Subjective   Beverly Rowan is a 59 y.o. year old female presenting for visit for follow-up .     She was last seen 7/11/2024.     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 Treated with IV solumedrol 1g x3 days and IV acyclovir. She initially developed genital herpes infection April, treated with acyclovir with resolution of the lesions. Two weeks later, developed bilateral lower extremity numbness and saddle anesthesia. On exam, she had diffuse hyperreflexia and possible meningismus, but no other focal weakness or sensory loss/sensory level. CSF showed a lymphocytic pleocytosis (WBC 56,), HSV PCR negative. MRI brain, cervical, thoracic and lumbar spine with and without contrast showed no myelitis. HIV, syphilis were also negative.     She is doing better. She started seeing a chiropractor for her neck which is helping with realignment. She has some stiffness after an adjustment. She still has an electrical current that travels down her left side. She has occasional numbness and tingling of her feet. This can come and go. She also has some burning in her toes. She initially had numbness in her calves and feet that has greatly improved.      Patient Active Problem List   Diagnosis    Impingement syndrome of right shoulder    Primary osteoarthritis of right knee    Traumatic rotator cuff tear, right, initial encounter    Chronic right shoulder pain    Anxiety    Hypothyroidism    HSV infection    Bilateral leg numbness    Transverse myelitis (Multi)    Cervicalgia    Small fiber neuropathy       Objective   Neurological Exam  Mental Status  Awake, alert and oriented to person, place and time. Speech is normal.    Cranial Nerves  CN III, IV, VI: Extraocular movements intact  bilaterally.  CN VII: Full and symmetric facial movement.  CN VIII: Hearing is normal.    Motor   Strength is 5/5 throughout all four extremities.    Sensory  Light touch is normal in upper and lower extremities. Pinprick is normal in upper and lower extremities.     Gait  Casual gait is normal including stance, stride, and arm swing.    Physical Exam  Eyes:      Extraocular Movements: Extraocular movements intact.   Neurological:      Motor: Motor strength is normal.  Psychiatric:         Speech: Speech normal.         Assessment/Plan   Diagnoses and all orders for this visit:  Transverse myelitis (Multi)  -     EMG & nerve conduction; Future  -     Vitamin B6; Future  -     Serum Protein Electrophoresis + Immunofixation; Future  -     Urine Protein Electrophoresis + Immunofixation; Future  -     JANENE + AMOS Panel; Future  -     Cryoglobulin Screen with reflex to Profile; Future  -     Celiac Panel; Future  Bilateral leg numbness  Cervicalgia  Small fiber neuropathy  -     EMG & nerve conduction; Future  -     Vitamin B6; Future  -     Serum Protein Electrophoresis + Immunofixation; Future  -     Urine Protein Electrophoresis + Immunofixation; Future  -     JANENE + AMOS Panel; Future  -     Cryoglobulin Screen with reflex to Profile; Future  -     Celiac Panel; Future    Transverse myelitis post HSV-2 genital infection: s/p IV steroids x3 days and IV acylovir. Anticipate she will continue to improve clinically   Cervicalgia, Lhermitte's phenomenon: unclear if this is a cervical radiculopathy vs residual Lhermitt'es from the TM. Will check neck flexion/extension xrays.      Follow-up in 6 months     There are no Patient Instructions on file for this visit.

## 2024-12-20 ENCOUNTER — LAB (OUTPATIENT)
Dept: LAB | Facility: LAB | Age: 59
End: 2024-12-20
Payer: COMMERCIAL

## 2024-12-20 DIAGNOSIS — G62.9 SMALL FIBER NEUROPATHY: ICD-10-CM

## 2024-12-20 DIAGNOSIS — G37.3 TRANSVERSE MYELITIS (MULTI): ICD-10-CM

## 2024-12-20 PROCEDURE — 84155 ASSAY OF PROTEIN SERUM: CPT

## 2024-12-20 PROCEDURE — 84156 ASSAY OF PROTEIN URINE: CPT

## 2024-12-20 PROCEDURE — 84165 PROTEIN E-PHORESIS SERUM: CPT

## 2024-12-20 PROCEDURE — 86038 ANTINUCLEAR ANTIBODIES: CPT

## 2024-12-20 PROCEDURE — 86335 IMMUNFIX E-PHORSIS/URINE/CSF: CPT

## 2024-12-20 PROCEDURE — 86334 IMMUNOFIX E-PHORESIS SERUM: CPT

## 2024-12-20 PROCEDURE — 83516 IMMUNOASSAY NONANTIBODY: CPT

## 2024-12-20 PROCEDURE — 86225 DNA ANTIBODY NATIVE: CPT

## 2024-12-20 PROCEDURE — 86235 NUCLEAR ANTIGEN ANTIBODY: CPT

## 2024-12-20 PROCEDURE — 84166 PROTEIN E-PHORESIS/URINE/CSF: CPT

## 2024-12-21 LAB
CENTROMERE B AB SER-ACNC: <0.2 AI
CHROMATIN AB SERPL-ACNC: <0.2 AI
DSDNA AB SER-ACNC: <1 IU/ML
ENA JO1 AB SER QL IA: <0.2 AI
ENA RNP AB SER IA-ACNC: <0.2 AI
ENA SCL70 AB SER QL IA: <0.2 AI
ENA SM AB SER IA-ACNC: <0.2 AI
ENA SM+RNP AB SER QL IA: <0.2 AI
ENA SS-A AB SER IA-ACNC: <0.2 AI
ENA SS-B AB SER IA-ACNC: <0.2 AI
GLIADIN PEPTIDE IGA SER IA-ACNC: <1 U/ML
PROT SERPL-MCNC: 7.3 G/DL (ref 6.4–8.2)
PROT UR-ACNC: 4 MG/DL (ref 5–25)
RIBOSOMAL P AB SER-ACNC: <0.2 AI
TTG IGA SER IA-ACNC: <1 U/ML

## 2024-12-23 LAB
ANA PATTERN: ABNORMAL
ANA SER QL HEP2 SUBST: POSITIVE
ANA TITR SER IF: ABNORMAL {TITER}
GLIADIN PEPTIDE IGG SER IA-ACNC: <0.56 FLU (ref 0–4.99)
TTG IGG SER IA-ACNC: <0.82 FLU (ref 0–4.99)

## 2024-12-24 LAB — PYRIDOXAL PHOS SERPL-SCNC: 278.5 NMOL/L (ref 20–125)

## 2024-12-26 LAB
ALBUMIN MFR UR ELPH: 35.4 %
ALBUMIN: 4.7 G/DL (ref 3.4–5)
ALPHA 1 GLOBULIN: 0.3 G/DL (ref 0.2–0.6)
ALPHA 2 GLOBULIN: 0.6 G/DL (ref 0.4–1.1)
ALPHA1 GLOB MFR UR ELPH: 9.9 %
ALPHA2 GLOB MFR UR ELPH: 19.1 %
B-GLOBULIN MFR UR ELPH: 14.7 %
BETA GLOBULIN: 0.9 G/DL (ref 0.5–1.2)
GAMMA GLOB MFR UR ELPH: 20.9 %
GAMMA GLOBULIN: 0.8 G/DL (ref 0.5–1.4)
IMMUNOFIXATION COMMENT: NORMAL
IMMUNOFIXATION COMMENT: NORMAL
PATH REVIEW - SERUM IMMUNOFIXATION: NORMAL
PATH REVIEW - URINE IMMUNOFIXATION: NORMAL
PATH REVIEW-SERUM PROTEIN ELECTROPHORESIS: NORMAL
PATH REVIEW-URINE PROTEIN ELECTROPHORESIS: NORMAL
PROTEIN ELECTROPHORESIS COMMENT: NORMAL
URINE ELECTROPHORESIS COMMENT: NORMAL

## 2025-03-05 ENCOUNTER — OFFICE VISIT (OUTPATIENT)
Dept: ORTHOPEDIC SURGERY | Facility: CLINIC | Age: 60
End: 2025-03-05
Payer: COMMERCIAL

## 2025-03-05 DIAGNOSIS — Z98.890 STATUS POST RIGHT ROTATOR CUFF REPAIR: Primary | ICD-10-CM

## 2025-03-05 PROCEDURE — 99213 OFFICE O/P EST LOW 20 MIN: CPT | Performed by: ORTHOPAEDIC SURGERY

## 2025-03-05 PROCEDURE — 1036F TOBACCO NON-USER: CPT | Performed by: ORTHOPAEDIC SURGERY

## 2025-03-05 PROCEDURE — 99214 OFFICE O/P EST MOD 30 MIN: CPT | Performed by: ORTHOPAEDIC SURGERY

## 2025-03-05 RX ORDER — DICLOFENAC SODIUM 75 MG/1
75 TABLET, DELAYED RELEASE ORAL 2 TIMES DAILY
Qty: 60 TABLET | Refills: 2 | Status: SHIPPED | OUTPATIENT
Start: 2025-03-05 | End: 2025-06-03

## 2025-03-05 NOTE — PROGRESS NOTES
History: Beverly is here for her right shoulder.  She is 1 year from an arthroscopic repair of a large rotator cuff tear. She notes that Monday she woke up and could not move her shoulder. Prior to this, her shoulder was doing well despite some pain and stiffness with specific activities. She has some mechanical symptoms with clicking. She denies numbness and tingling down the arm.     Past medical history: Multiple  Medications: Multiple  Allergies: No known drug allergies    Please refer to the intake H&P regarding the patient's review of systems, family history and social history as was done today    HEENT: Normal  Lungs: Clear to auscultation  Heart: RRR  Abdomen: Soft, nontender  Skin: clear  Extremity: She get the arm fully overhead.  She has 5 out of 5 abduction and supraspinatus strength with mild pain during stressing.  She has 5 out of 5 rotational strength again with mild pain.  There is some pain with impingement overs.  Active internal rotation is also bit limited and causing some pain.  No numbness.  Contralateral exam is normal for strength, motion, stability and neurovascular assessment.    Radiographs: Previous x-rays show stable alignment of the glenohumeral joint    Assessment: Stable right rotator cuff repair, 1 year out with recent increase in pain    Plan: She returns today with some increased pain and stiffness since waking up on Monday. She understands that she probably irritated the shoulder which is causing the stiffness. We discussed several options and modalities for treatment. We gave her a prescription trial of Diclofenac today. She can continue working on her own therapy at home as well as icing throughout the day. The shoulder does not bother her enough for a cortisone injection. She can follow-up in a month if pain persists and we could consider injection at that time.  We could also pursue MRI imaging although her pain and dysfunction is not bad enough to consider further surgery  at this point.  She denies the need for any work restrictions today.   All questions were answered today with the patient.    This note was generated with voice recognition software and may contain grammatical errors.    Scribe Attestation  By signing my name below, [employer] Scribe   attest that this documentation has been prepared under the direction and in the presence of Daron Condon MD.

## 2025-03-13 ENCOUNTER — APPOINTMENT (OUTPATIENT)
Dept: PRIMARY CARE | Facility: CLINIC | Age: 60
End: 2025-03-13
Payer: COMMERCIAL

## 2025-03-13 VITALS
TEMPERATURE: 98.2 F | HEART RATE: 74 BPM | OXYGEN SATURATION: 97 % | SYSTOLIC BLOOD PRESSURE: 136 MMHG | DIASTOLIC BLOOD PRESSURE: 74 MMHG | WEIGHT: 143 LBS | HEIGHT: 67 IN | BODY MASS INDEX: 22.44 KG/M2

## 2025-03-13 DIAGNOSIS — G47.9 SLEEP DIFFICULTIES: ICD-10-CM

## 2025-03-13 DIAGNOSIS — M25.511 ACUTE PAIN OF RIGHT SHOULDER: ICD-10-CM

## 2025-03-13 PROCEDURE — 1036F TOBACCO NON-USER: CPT | Performed by: FAMILY MEDICINE

## 2025-03-13 PROCEDURE — 3008F BODY MASS INDEX DOCD: CPT | Performed by: FAMILY MEDICINE

## 2025-03-13 PROCEDURE — 99213 OFFICE O/P EST LOW 20 MIN: CPT | Performed by: FAMILY MEDICINE

## 2025-03-13 RX ORDER — DIAZEPAM 10 MG/1
10 TABLET ORAL EVERY 12 HOURS PRN
Qty: 180 TABLET | Refills: 0 | Status: SHIPPED | OUTPATIENT
Start: 2025-03-13 | End: 2025-06-11

## 2025-03-13 RX ORDER — DIAZEPAM 10 MG/1
10 TABLET ORAL EVERY 12 HOURS PRN
Qty: 15 TABLET | Refills: 0 | Status: SHIPPED | OUTPATIENT
Start: 2025-03-13 | End: 2025-03-21

## 2025-03-13 RX ORDER — IBUPROFEN 800 MG/1
800 TABLET ORAL 3 TIMES DAILY PRN
Qty: 90 TABLET | Refills: 2 | Status: SHIPPED | OUTPATIENT
Start: 2025-03-13 | End: 2026-03-13

## 2025-03-13 NOTE — PROGRESS NOTES
Subjective   Patient ID: Beverly Rowan is a 59 y.o. female who presents for No chief complaint on file..  HPI  Patient presents in the office today with concerns of Haven’t been feeling the greatest lately- off and on fevers, body aches, weight gain, fatigue, dizziness, vocal change and muscle cramps. Ongoing X months. Has tried ibuprofen for fever.  Review of Systems  Constitutional:  no chills, no fever and no night sweats.  Eyes: no blurred vision and no eyesight problems.  ENT: no hearing loss, no nasal congestion, no hoarseness and no sore throat.  Neck: no mass (es) and no swelling.  Cardiovascular: no chest pain, no intermittent leg claudication, no lower extremity edema, no palpitation and no syncope.  Respiratory: no cough, no shortness of breath during exertion, no shortness of breath at rest and no wheezing.  Gastrointestinal: no abdominal pain, no blood in stools, no constipation, no diarrhea, no melena, no nausea, no rectal pain and no vomiting.  Genitourinary: no dysuria, no change in urinary frequency, no urinary hesitancy and no feelings of urinary urgency.  Musculoskeletal: no arthralgias, no back pain and no myalgias.  Integumentary: no new skin lesions and no rashes.  Neurological: no difficulty walking, no headache, no limb weakness, no numbness and no tingling.  Psychiatric/Behavioral: no anxiety, no depression, no anhedonia and no substance use disorders.  Endocrine: no recent weight gain and no recent weight loss.  Hematologic/Lymphatic: no tendency for easy bruising and no swollen glands    Objective   Physical Exam  Patient states she is still not feeling well flinching neurology's who has been doing a bunch of different test recently had a cryoglobulin test on the results are not back yet patient states she supposed to have EMG nerve conduction done in the upper lower extremities has not been done yet encouraged her to get that scheduled intermittent fever spikes lasting less than 24 hours but  no other specific illness just not feeling right.  Well-developed well-nourished female in no acute distress she has neck stiffness and trapezius tightness she is seeing a chiropractor for that if she flexes her neck chin to her chest she gets some numbness and tingling in the neck and upper arms which resolves when she lifts her head back up.  None she has no peripheral edema lungs are clear cardiac and abdominal exams are benign.  There were no vitals taken for this visit.    Lab Results   Component Value Date    WBC 4.0 (L) 06/06/2024    HGB 13.6 06/06/2024    HCT 40.0 06/06/2024    MCV 90 06/06/2024     06/06/2024       Assessment/Plan plan is to refer her back to neurology for further evaluation and follow-up with us if there is anything more she needs for me.  Problem List Items Addressed This Visit    None

## 2025-03-17 ENCOUNTER — TELEPHONE (OUTPATIENT)
Dept: PRIMARY CARE | Facility: CLINIC | Age: 60
End: 2025-03-17
Payer: COMMERCIAL

## 2025-03-17 DIAGNOSIS — G47.9 SLEEP DIFFICULTIES: ICD-10-CM

## 2025-03-17 RX ORDER — DIAZEPAM 10 MG/1
10 TABLET ORAL EVERY 12 HOURS PRN
Qty: 15 TABLET | Refills: 0 | Status: SHIPPED | OUTPATIENT
Start: 2025-03-17 | End: 2025-03-18 | Stop reason: SDUPTHER

## 2025-03-17 NOTE — TELEPHONE ENCOUNTER
Received a fax from [facility] requesting prescription clarification request for Diazepam 10 mg    Please resend a New RX that includes the minimum day supply the RX is intended to last per [place] state laws. (See scanned in RX clarification request)

## 2025-03-18 DIAGNOSIS — G37.3 TRANSVERSE MYELITIS (MULTI): Primary | ICD-10-CM

## 2025-03-18 DIAGNOSIS — G47.9 SLEEP DIFFICULTIES: ICD-10-CM

## 2025-03-18 LAB
CRYOCRIT SER SPUN WESTERGREN: ABNORMAL %
CRYOGLOB SER QL: POSITIVE
CRYOGLOBULIN [PRESENCE] IN SERUM BY IMMUNE DIFFUSION (ID): ABNORMAL
CRYOPROTEINS [INTERPRETATION] IN SERUM OR PLASMA BY ELECTROPHORESIS AGAROSE GEL: ABNORMAL
RHEUMATOID FACT SERPL-ACNC: 11 IU/ML

## 2025-03-18 RX ORDER — DIAZEPAM 10 MG/1
10 TABLET ORAL EVERY 12 HOURS PRN
Qty: 15 TABLET | Refills: 0 | Status: SHIPPED | OUTPATIENT
Start: 2025-03-18 | End: 2025-03-26

## 2025-03-18 NOTE — TELEPHONE ENCOUNTER
Beverly ZHAO [id number] [id number] Clinical Support Staff (supporting Chad Malone MD)25 minutes ago (2:38 PM)       I met with Dr. Malone on March 13 and requested a refill on the Valium to be sent to [facility] and 5 10mg to be sent to [facility] in [place] to tide me over til I get the refill [facility]. Do you know if he did this bc I haven’t heard from either pharmacy? Thank you!!      PLEASE FIX SHORT SUPPLY. CANCEL SHORT SUPPLY TO [facility] AND SEND THE SHORT SUPPLY TO [facility]. MED PENDED. KEEP THE ORIGINAL 30 DAY SUPPLY TO [facility]

## 2025-03-21 DIAGNOSIS — D89.1 CRYOGLOBULINEMIA (CMS-HCC): ICD-10-CM

## 2025-03-21 DIAGNOSIS — G62.9 SMALL FIBER NEUROPATHY: Primary | ICD-10-CM

## 2025-03-24 PROCEDURE — 83521 IG LIGHT CHAINS FREE EACH: CPT

## 2025-03-25 ENCOUNTER — LAB (OUTPATIENT)
Dept: LAB | Facility: HOSPITAL | Age: 60
End: 2025-03-25
Payer: COMMERCIAL

## 2025-03-25 LAB
KAPPA LC SERPL-MCNC: 1.79 MG/DL (ref 0.33–1.94)
KAPPA LC/LAMBDA SER: 1.42 {RATIO} (ref 0.26–1.65)
LAMBDA LC SERPL-MCNC: 1.26 MG/DL (ref 0.57–2.63)

## 2025-03-31 DIAGNOSIS — G47.9 SLEEP DIFFICULTIES: ICD-10-CM

## 2025-03-31 RX ORDER — DIAZEPAM 10 MG/1
10 TABLET ORAL EVERY 12 HOURS PRN
Qty: 180 TABLET | Refills: 0 | Status: SHIPPED | OUTPATIENT
Start: 2025-03-31 | End: 2025-06-29

## 2025-03-31 NOTE — TELEPHONE ENCOUNTER
Please advise. Medication pended     Beverly Rowan [id number] [id number] Clinical Support Staff  Phone Number: [phone]     [facility] still has NOT received the new script for the Valium 10 mg. You sent over the script for the 15 day supply not the 180 day supply. The 15 day supply has already been filled at the local [facility]. Please send over the new one ASAP as this is now going on 3 weeks. As of today 3/31/25 the pharmacy at [facility] has not received anything in regards to this medication. I appreciate your immediate action. Thank you! Beverly

## 2025-04-09 ENCOUNTER — OFFICE VISIT (OUTPATIENT)
Dept: HEMATOLOGY/ONCOLOGY | Facility: CLINIC | Age: 60
End: 2025-04-09
Payer: COMMERCIAL

## 2025-04-09 VITALS
TEMPERATURE: 97.7 F | DIASTOLIC BLOOD PRESSURE: 76 MMHG | OXYGEN SATURATION: 95 % | SYSTOLIC BLOOD PRESSURE: 124 MMHG | HEART RATE: 83 BPM | RESPIRATION RATE: 16 BRPM

## 2025-04-09 DIAGNOSIS — D89.1 CRYOGLOBULINEMIA (CMS-HCC): ICD-10-CM

## 2025-04-09 DIAGNOSIS — E03.9 HYPOTHYROIDISM, UNSPECIFIED TYPE: ICD-10-CM

## 2025-04-09 DIAGNOSIS — G62.9 SMALL FIBER NEUROPATHY: ICD-10-CM

## 2025-04-09 DIAGNOSIS — R77.8 ABNORMAL SPEP: Primary | ICD-10-CM

## 2025-04-09 DIAGNOSIS — F41.9 ANXIETY: ICD-10-CM

## 2025-04-09 DIAGNOSIS — G37.3 TRANSVERSE MYELITIS (MULTI): ICD-10-CM

## 2025-04-09 PROCEDURE — 99204 OFFICE O/P NEW MOD 45 MIN: CPT | Performed by: INTERNAL MEDICINE

## 2025-04-09 PROCEDURE — 99214 OFFICE O/P EST MOD 30 MIN: CPT | Performed by: INTERNAL MEDICINE

## 2025-04-09 ASSESSMENT — PAIN SCALES - GENERAL: PAINLEVEL_OUTOF10: 0-NO PAIN

## 2025-04-09 NOTE — PROGRESS NOTES
Patient ID: Beverly Rowan is a 59 y.o. female.  Referring Physician: Corina Vazquez MD  [address], [place] [address]  Primary Care Provider: Chad Malone MD  Visit Type: Initial Visit      Subjective    HPI I was referred here by Dr Vazquez  I was diagnosed with transverse myelitis last year  I have been having sweats and low grade fevers for the last couple months    Review of Systems   Constitutional:  Positive for fatigue and fever.   HENT:  Negative.     Eyes: Negative.    Respiratory: Negative.     Cardiovascular: Negative.    Gastrointestinal: Negative.    Endocrine: Negative.    Genitourinary: Negative.     Musculoskeletal: Negative.    Skin: Negative.    Neurological: Negative.    Hematological: Negative.    Psychiatric/Behavioral: Negative.          Objective   BSA: There is no height or weight on file to calculate BSA.  /76 (BP Location: Right arm)   Pulse 83   Temp 36.5 °C (97.7 °F) (Temporal)   Resp 16   SpO2 95%      has a past medical history of Anxiety (2018), Arthritis, Depression, Disease of thyroid gland (1995), Dorsalgia, unspecified (02/12/2020), Encounter for other screening for malignant neoplasm of breast (05/19/2021), Fracture of one rib, left side, subsequent encounter for fracture with routine healing (07/23/2018), Hypothyroidism, Nausea (05/24/2019), Other injury of unspecified body region, initial encounter (06/21/2021), Other malaise (10/22/2020), Other microscopic hematuria (05/24/2019), Pain in thoracic spine (02/24/2020), Personal history of other drug therapy (10/22/2020), Personal history of other infectious and parasitic diseases (05/19/2021), Personal history of other specified conditions (06/27/2022), Personal history of urinary (tract) infections (05/19/2021), PTSD (post-traumatic stress disorder), Right lower quadrant pain (11/27/2019), Stiffness of unspecified hip, not elsewhere classified (02/24/2020), and Unspecified symptoms  and signs involving the genitourinary system (05/19/2021).   has a past surgical history that includes Other surgical history (06/18/2021); [place] tooth extraction; and Foot surgery.  Family History   Problem Relation Name Age of Onset    Alcohol abuse Father Marco Rowan     Heart disease Father Marco Rowan     Hypertension Father Marco Rowan      Oncology History    No history exists.       Beverly Rowan  reports that she has quit smoking. Her smoking use included cigarettes. She started smoking about 39 years ago. She has never used smokeless tobacco.  She  reports current alcohol use.  She  reports current drug use. Drug: Marijuana.    Physical Exam  Vitals reviewed.   Constitutional:       Appearance: Normal appearance.   HENT:      Head: Normocephalic.      Mouth/Throat:      Mouth: Mucous membranes are moist.   Eyes:      Extraocular Movements: Extraocular movements intact.      Pupils: Pupils are equal, round, and reactive to light.   Cardiovascular:      Rate and Rhythm: Normal rate and regular rhythm.      Pulses: Normal pulses.      Heart sounds: Normal heart sounds.   Pulmonary:      Effort: Pulmonary effort is normal.      Breath sounds: Normal breath sounds.   Abdominal:      General: Bowel sounds are normal.      Palpations: Abdomen is soft.   Musculoskeletal:         General: Normal range of motion.      Cervical back: Normal range of motion and neck supple.   Skin:     General: Skin is warm.   Neurological:      General: No focal deficit present.      Mental Status: She is alert and oriented to person, place, and time.   Psychiatric:         Mood and Affect: Mood normal.         Behavior: Behavior normal.         WBC   Date/Time Value Ref Range Status   06/06/2024 10:25 AM 4.0 (L) 4.4 - 11.3 x10*3/uL Final   05/20/2024 06:59 AM 10.0 4.4 - 11.3 x10*3/uL Final   05/19/2024 06:38 AM 7.3 4.4 - 11.3 x10*3/uL Final     nRBC   Date Value Ref Range Status   06/06/2024 0.0 0.0 - 0.0 /100 WBCs Final  "  05/20/2024 0.0 0.0 - 0.0 /100 WBCs Final   05/19/2024 0.0 0.0 - 0.0 /100 WBCs Final     RBC   Date Value Ref Range Status   06/06/2024 4.47 4.00 - 5.20 x10*6/uL Final   05/20/2024 4.35 4.00 - 5.20 x10*6/uL Final   05/19/2024 4.44 4.00 - 5.20 x10*6/uL Final     Hemoglobin   Date Value Ref Range Status   06/06/2024 13.6 12.0 - 16.0 g/dL Final   05/20/2024 13.1 12.0 - 16.0 g/dL Final   05/19/2024 13.2 12.0 - 16.0 g/dL Final     Hematocrit   Date Value Ref Range Status   06/06/2024 40.0 36.0 - 46.0 % Final   05/20/2024 39.5 36.0 - 46.0 % Final   05/19/2024 38.4 36.0 - 46.0 % Final     MCV   Date/Time Value Ref Range Status   06/06/2024 10:25 AM 90 80 - 100 fL Final   05/20/2024 06:59 AM 91 80 - 100 fL Final   05/19/2024 06:38 AM 87 80 - 100 fL Final     MCH   Date/Time Value Ref Range Status   06/06/2024 10:25 AM 30.4 26.0 - 34.0 pg Final   05/20/2024 06:59 AM 30.1 26.0 - 34.0 pg Final   05/19/2024 06:38 AM 29.7 26.0 - 34.0 pg Final     MCHC   Date/Time Value Ref Range Status   06/06/2024 10:25 AM 34.0 32.0 - 36.0 g/dL Final   05/20/2024 06:59 AM 33.2 32.0 - 36.0 g/dL Final   05/19/2024 06:38 AM 34.4 32.0 - 36.0 g/dL Final     RDW   Date/Time Value Ref Range Status   06/06/2024 10:25 AM 13.5 11.5 - 14.5 % Final   05/20/2024 06:59 AM 12.4 11.5 - 14.5 % Final   05/19/2024 06:38 AM 12.3 11.5 - 14.5 % Final     Platelets   Date/Time Value Ref Range Status   06/06/2024 10:25  150 - 450 x10*3/uL Final   05/20/2024 06:59  150 - 450 x10*3/uL Final   05/19/2024 06:38  150 - 450 x10*3/uL Final     No results found for: \"MPV\"  Neutrophils %   Date/Time Value Ref Range Status   06/06/2024 10:25 AM 68.5 40.0 - 80.0 % Final   05/16/2024 02:23 PM 70.4 40.0 - 80.0 % Final   02/08/2024 05:20 PM 69.2 40.0 - 80.0 % Final     Immature Granulocytes %, Automated   Date/Time Value Ref Range Status   06/06/2024 10:25 AM 0.2 0.0 - 0.9 % Final     Comment:     Immature Granulocyte Count (IG) includes promyelocytes, myelocytes " and metamyelocytes but does not include bands. Percent differential counts (%) should be interpreted in the context of the absolute cell counts (cells/UL).   05/16/2024 02:23 PM 0.2 0.0 - 0.9 % Final     Comment:     Immature Granulocyte Count (IG) includes promyelocytes, myelocytes and metamyelocytes but does not include bands. Percent differential counts (%) should be interpreted in the context of the absolute cell counts (cells/UL).   02/08/2024 05:20 PM 0.2 0.0 - 0.9 % Final     Comment:     Immature Granulocyte Count (IG) includes promyelocytes, myelocytes and metamyelocytes but does not include bands. Percent differential counts (%) should be interpreted in the context of the absolute cell counts (cells/UL).     Lymphocytes %   Date/Time Value Ref Range Status   06/06/2024 10:25 AM 23.4 13.0 - 44.0 % Final   05/16/2024 02:23 PM 21.9 13.0 - 44.0 % Final   02/08/2024 05:20 PM 23.4 13.0 - 44.0 % Final     Monocytes %   Date/Time Value Ref Range Status   06/06/2024 10:25 AM 6.5 2.0 - 10.0 % Final   05/16/2024 02:23 PM 6.7 2.0 - 10.0 % Final   02/08/2024 05:20 PM 6.4 2.0 - 10.0 % Final     Eosinophils %   Date/Time Value Ref Range Status   06/06/2024 10:25 AM 0.7 0.0 - 6.0 % Final   05/16/2024 02:23 PM 0.4 0.0 - 6.0 % Final   02/08/2024 05:20 PM 0.4 0.0 - 6.0 % Final     Basophils %   Date/Time Value Ref Range Status   06/06/2024 10:25 AM 0.7 0.0 - 2.0 % Final   05/16/2024 02:23 PM 0.4 0.0 - 2.0 % Final   02/08/2024 05:20 PM 0.4 0.0 - 2.0 % Final     Neutrophils Absolute   Date/Time Value Ref Range Status   06/06/2024 10:25 AM 2.74 1.20 - 7.70 x10*3/uL Final     Comment:     Percent differential counts (%) should be interpreted in the context of the absolute cell counts (cells/uL).   05/16/2024 02:23 PM 3.48 1.20 - 7.70 x10*3/uL Final     Comment:     Percent differential counts (%) should be interpreted in the context of the absolute cell counts (cells/uL).   02/08/2024 05:20 PM 3.68 1.20 - 7.70 x10*3/uL Final      "Comment:     Percent differential counts (%) should be interpreted in the context of the absolute cell counts (cells/uL).     Immature Granulocytes Absolute, Automated   Date/Time Value Ref Range Status   06/06/2024 10:25 AM 0.01 0.00 - 0.70 x10*3/uL Final   05/16/2024 02:23 PM 0.01 0.00 - 0.70 x10*3/uL Final   02/08/2024 05:20 PM 0.01 0.00 - 0.70 x10*3/uL Final     Lymphocytes Absolute   Date/Time Value Ref Range Status   06/06/2024 10:25 AM 0.94 (L) 1.20 - 4.80 x10*3/uL Final   05/16/2024 02:23 PM 1.08 (L) 1.20 - 4.80 x10*3/uL Final   02/08/2024 05:20 PM 1.24 1.20 - 4.80 x10*3/uL Final     Monocytes Absolute   Date/Time Value Ref Range Status   06/06/2024 10:25 AM 0.26 0.10 - 1.00 x10*3/uL Final   05/16/2024 02:23 PM 0.33 0.10 - 1.00 x10*3/uL Final   02/08/2024 05:20 PM 0.34 0.10 - 1.00 x10*3/uL Final     Eosinophils Absolute   Date/Time Value Ref Range Status   06/06/2024 10:25 AM 0.03 0.00 - 0.70 x10*3/uL Final   05/16/2024 02:23 PM 0.02 0.00 - 0.70 x10*3/uL Final   02/08/2024 05:20 PM 0.02 0.00 - 0.70 x10*3/uL Final     Basophils Absolute   Date/Time Value Ref Range Status   06/06/2024 10:25 AM 0.03 0.00 - 0.10 x10*3/uL Final   05/16/2024 02:23 PM 0.02 0.00 - 0.10 x10*3/uL Final   02/08/2024 05:20 PM 0.02 0.00 - 0.10 x10*3/uL Final       No components found for: \"PT\"  aPTT   Date/Time Value Ref Range Status   08/30/2022 02:22 PM 32 26 - 39 sec Final     Comment:       THE APTT IS NO LONGER USED FOR MONITORING     UNFRACTIONATED HEPARIN THERAPY.    FOR MONITORING HEPARIN THERAPY,     USE THE HEPARIN ASSAY.       Medication Documentation Review Audit       Reviewed by Tamiko Holland MA (Medical Assistant) on 04/09/25 at 0821      Medication Order Taking? Sig Documenting Provider Last Dose Status   busPIRone (Buspar) 10 mg tablet [id number] Yes Take 1 tablet (10 mg) by mouth 2 times a day. Pt reports taking once in morning and once in late afternoon. Historical Provider, MD Taking Active   diazePAM (Valium) 10 mg " "tablet [id number]  Take 1 tablet (10 mg) by mouth every 12 hours if needed for anxiety for up to 8 days. Chad Malone MD   25 2359   diazePAM (Valium) 10 mg tablet [id number] Yes Take 1 tablet (10 mg) by mouth every 12 hours if needed for anxiety. Chad Malone MD  Active   diclofenac (Voltaren) 75 mg EC tablet [id number] Yes Take 1 tablet (75 mg) by mouth 2 times a day. Daron Condon MD  Active   ibuprofen 800 mg tablet [id number] Yes Take 1 tablet (800 mg) by mouth 3 times a day as needed for mild pain (1 - 3) (pain). Chad Malone MD  Active   LevoxyL 100 mcg tablet [id number] Yes TAKE ONE TABLET BY MOUTH EVERY MORNING BEFORE A MEAL (KATHLEEN) Chda Malone MD  Active   omeprazole (PriLOSEC) 20 mg DR capsule [id number] Yes Take 1 capsule (20 mg) by mouth once daily in the morning. Take before meals. Historical Provider, MD Taking Active   sertraline (Zoloft) 100 mg tablet [id number] Yes Take 2 tablets (200 mg) by mouth once daily. Patient reports taking 100mg in morning and 100mg at bedtime. Historical Provider, MD Taking Active   traZODone (Desyrel) 50 mg tablet [id number] Yes Take 1 tablet (50 mg) by mouth once daily at bedtime. Historical Provider, MD Taking Active                     Assessment/Plan    1) abnormal SPEP  -additional workup in 2024 was done post-transverse myelitis  -B6 278.5  -SPEP/IF with faint discrete IgM lambda band in gamma region  -UPEP/IF negative  -JANENE positive, homogeneous, 1:160, reflex AMOS panel negative  -celiac panel negative  -cryoglobulin screen: positive  -kappa LC 1.79, lambda LC 1.26  -unclear if her sweats and low grade fevers are related  -lymphoplasmacytic lymphoma and multiple myeloma are both associated with IgM M proteins, and while active disease for either would be associated with much higher bands rather than just a \"faint band\" the only way to definitively rule out hematologic malignancy would be with bone marrow " biopsy/aspiration  -she is in agreement to proceed with bmbx    2) transverse myelitis  -was admitted to [facility] in 4/2024 for 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

## 2025-04-12 PROBLEM — D89.1 CRYOGLOBULINEMIA (CMS-HCC): Status: ACTIVE | Noted: 2025-04-12

## 2025-04-12 PROBLEM — R77.8 ABNORMAL SPEP: Status: ACTIVE | Noted: 2025-04-12

## 2025-04-12 ASSESSMENT — ENCOUNTER SYMPTOMS
CARDIOVASCULAR NEGATIVE: 1
NEUROLOGICAL NEGATIVE: 1
HEMATOLOGIC/LYMPHATIC NEGATIVE: 1
PSYCHIATRIC NEGATIVE: 1
RESPIRATORY NEGATIVE: 1
FEVER: 1
MUSCULOSKELETAL NEGATIVE: 1
ENDOCRINE NEGATIVE: 1
EYES NEGATIVE: 1
GASTROINTESTINAL NEGATIVE: 1
FATIGUE: 1

## 2025-04-29 DIAGNOSIS — E03.9 HYPOTHYROIDISM, UNSPECIFIED TYPE: ICD-10-CM

## 2025-04-29 RX ORDER — LEVOTHYROXINE SODIUM 100 UG/1
TABLET ORAL
Qty: 90 TABLET | Refills: 1 | Status: SHIPPED | OUTPATIENT
Start: 2025-04-29

## 2025-04-30 DIAGNOSIS — R77.8 ABNORMAL SPEP: ICD-10-CM

## 2025-04-30 DIAGNOSIS — D89.1 CRYOGLOBULINEMIA (CMS-HCC): Primary | ICD-10-CM

## 2025-05-01 ENCOUNTER — PROCEDURE VISIT (OUTPATIENT)
Dept: HEMATOLOGY/ONCOLOGY | Facility: CLINIC | Age: 60
End: 2025-05-01
Payer: COMMERCIAL

## 2025-05-01 ENCOUNTER — APPOINTMENT (OUTPATIENT)
Dept: LAB | Facility: CLINIC | Age: 60
End: 2025-05-01
Payer: COMMERCIAL

## 2025-05-01 VITALS
HEART RATE: 77 BPM | TEMPERATURE: 98.1 F | RESPIRATION RATE: 18 BRPM | OXYGEN SATURATION: 99 % | SYSTOLIC BLOOD PRESSURE: 121 MMHG | DIASTOLIC BLOOD PRESSURE: 74 MMHG

## 2025-05-01 DIAGNOSIS — R77.8 ABNORMAL SPEP: ICD-10-CM

## 2025-05-01 DIAGNOSIS — D89.1 CRYOGLOBULINEMIA (CMS-HCC): ICD-10-CM

## 2025-05-01 LAB
BASOPHILS # BLD AUTO: 0.03 X10*3/UL (ref 0–0.1)
BASOPHILS NFR BLD AUTO: 0.6 %
EOSINOPHIL # BLD AUTO: 0.04 X10*3/UL (ref 0–0.7)
EOSINOPHIL NFR BLD AUTO: 0.7 %
ERYTHROCYTE [DISTWIDTH] IN BLOOD BY AUTOMATED COUNT: 12.8 % (ref 11.5–14.5)
HCT VFR BLD AUTO: 40.5 % (ref 36–46)
HGB BLD-MCNC: 13.7 G/DL (ref 12–16)
IMM GRANULOCYTES # BLD AUTO: 0.01 X10*3/UL (ref 0–0.7)
IMM GRANULOCYTES NFR BLD AUTO: 0.2 % (ref 0–0.9)
LYMPHOCYTES # BLD AUTO: 1.02 X10*3/UL (ref 1.2–4.8)
LYMPHOCYTES NFR BLD AUTO: 19.1 %
MCH RBC QN AUTO: 29.9 PG (ref 26–34)
MCHC RBC AUTO-ENTMCNC: 33.8 G/DL (ref 32–36)
MCV RBC AUTO: 88 FL (ref 80–100)
MONOCYTES # BLD AUTO: 0.4 X10*3/UL (ref 0.1–1)
MONOCYTES NFR BLD AUTO: 7.5 %
NEUTROPHILS # BLD AUTO: 3.85 X10*3/UL (ref 1.2–7.7)
NEUTROPHILS NFR BLD AUTO: 71.9 %
PLATELET # BLD AUTO: 188 X10*3/UL (ref 150–450)
RBC # BLD AUTO: 4.58 X10*6/UL (ref 4–5.2)
WBC # BLD AUTO: 5.4 X10*3/UL (ref 4.4–11.3)

## 2025-05-01 PROCEDURE — 85025 COMPLETE CBC W/AUTO DIFF WBC: CPT

## 2025-05-01 PROCEDURE — 38222 DX BONE MARROW BX & ASPIR: CPT

## 2025-05-01 PROCEDURE — 38222 DX BONE MARROW BX & ASPIR: CPT | Mod: LT

## 2025-05-01 PROCEDURE — 36415 COLL VENOUS BLD VENIPUNCTURE: CPT

## 2025-05-01 ASSESSMENT — PAIN SCALES - GENERAL: PAINLEVEL_OUTOF10: 0-NO PAIN

## 2025-05-01 NOTE — PROGRESS NOTES
Patient ID: Beverly Rowan is a 59 y.o. female.    Biopsy bone marrow    Date/Time: 5/1/2025 10:00 AM    Performed by: LAVONNE Espinal  Authorized by: LAVONNE Espinal    Consent:     Consent obtained:  Verbal and written    Consent given by:  Patient    Risks, benefits, and alternatives were discussed: yes      Risks discussed:  Bleeding, infection and pain  Universal protocol:     Procedure explained and questions answered to patient or proxy's satisfaction: yes      Relevant documents present and verified: yes      Test results available: yes      Imaging studies available: yes      Required blood products, implants, devices, and special equipment available: yes      Site/side marked: yes      Immediately prior to procedure, a time out was called: yes      Patient identity confirmed:  Verbally with patient  Indications:     Indications:  Abnormal SPEP, cryoglobulinemia  Pre-procedure details:     Skin preparation:  Chlorhexidine    Preparation: Patient was prepped and draped in the usual sterile fashion    Sedation:     Sedation type:  None  Anesthesia:     Anesthesia method:  Local infiltration    Local anesthetic:  Lidocaine 1% w/o epi  Procedure specific details:      The procedure was explained and potential complications were reviewed with the patient including the risks for bleeding, infection, & discomfort/pain at the bone marrow biopsy site. The patient was given time for questions regarding the procedure. The patient has agreed to proceed and an electronically signed consent was obtained.  The patient's most recent H&P were reviewed and relevant findings were noted.  The patient's allergies and relevant medications were reviewed. A pre-procedure time out was performed to properly identify the patient and the procedure to be performed.  The patient was placed in the prone position and the left posterior iliac crest bone marrow biopsy site was identified & marked.  Next, the skin at the  marked bone marrow biopsy site was cleansed with Chlorhexidine solution and sterile drapes were placed.  The skin, subcutaneous tissue, & periosteum below the marked bone marrow biopsy site were anesthetized using 10 ml of 1% Lidocaine solution.  Next, a Jamshidi needle was inserted at the marked biopsy site and slowly advanced into the posterior iliac crest.  Marrow aspirate and core biopsy were obtained and sent to pathology for review and testing. The needle was removed and sterile dressing was applied to the biopsy site. The patient tolerated the procedure well without incident. Prior to discharge, the biopsy site was inspected and the patient was given written post procedure care instructions. Patient instructed to leave bandage on for 24 hours and monitor site for the next 2-3 days for signs of infection or bleeding (fever, chills, pain, purulent drainage, warmth, swelling, redness, bleeding or bruise larger than a half dollar). Instructed to call the ordering provider for any concerns post procedure. Confirmed follow up appointment is scheduled with ordering provider to review results.     Small core on first attempt. Attempt 3x for larger core, unable to get larger core - sent small core to pathology.    Post-procedure details:     Procedure completion:  Tolerated well, no immediate complications    Sandi Ramirez MSN, APRN, A-GNP-C, AOCNP  [facility]  Division of Hematology & Medical Oncology   [place] [address]  Phone: [phone]  Available via Localist Secure Chat  [email or web address]

## 2025-05-07 LAB
CELL COUNT (BLOOD): 17.44 X10*3/UL
CELL POPULATIONS: NORMAL
DIAGNOSIS: NORMAL
FLOW DIFFERENTIAL: NORMAL
FLOW TEST ORDERED: NORMAL
LAB TEST METHOD: NORMAL
NUMBER OF CELLS COLLECTED: NORMAL PER TUBE
PATH REPORT.COMMENTS IMP SPEC: NORMAL
PATH REPORT.FINAL DX SPEC: NORMAL
PATH REPORT.GROSS SPEC: NORMAL
PATH REPORT.MICROSCOPIC SPEC OTHER STN: NORMAL
PATH REPORT.RELEVANT HX SPEC: NORMAL
PATH REPORT.TOTAL CANCER: NORMAL
PATH REPORT.TOTAL CANCER: NORMAL
SIGNATURE COMMENT: NORMAL
SPECIMEN VIABILITY: NORMAL

## 2025-05-09 LAB
CHROM ANALY OVERALL INTERP-IMP: NORMAL
ELECTRONICALLY SIGNED BY CYTOGENETICS: NORMAL
ELECTRONICALLY SIGNED BY: NORMAL
LYMPHOID NGS RESULTS: NORMAL

## 2025-05-12 LAB
ELECTRONICALLY SIGNED BY: ABNORMAL
MYD88 L265P INTERPRETATION: ABNORMAL
MYD88 L265P MUTATION RESULTS: ABNORMAL

## 2025-05-14 ENCOUNTER — LAB (OUTPATIENT)
Dept: LAB | Facility: CLINIC | Age: 60
End: 2025-05-14
Payer: COMMERCIAL

## 2025-05-14 ENCOUNTER — OFFICE VISIT (OUTPATIENT)
Dept: HEMATOLOGY/ONCOLOGY | Facility: CLINIC | Age: 60
End: 2025-05-14
Payer: COMMERCIAL

## 2025-05-14 VITALS
DIASTOLIC BLOOD PRESSURE: 82 MMHG | TEMPERATURE: 96.8 F | RESPIRATION RATE: 16 BRPM | HEART RATE: 77 BPM | SYSTOLIC BLOOD PRESSURE: 155 MMHG | OXYGEN SATURATION: 95 %

## 2025-05-14 DIAGNOSIS — F41.9 ANXIETY: ICD-10-CM

## 2025-05-14 DIAGNOSIS — C83.00 LYMPHOPLASMACYTIC LYMPHOMA (MULTI): ICD-10-CM

## 2025-05-14 DIAGNOSIS — G37.3 TRANSVERSE MYELITIS (MULTI): ICD-10-CM

## 2025-05-14 DIAGNOSIS — E03.9 HYPOTHYROIDISM, UNSPECIFIED TYPE: ICD-10-CM

## 2025-05-14 DIAGNOSIS — C83.00 LYMPHOPLASMACYTIC LYMPHOMA (MULTI): Primary | ICD-10-CM

## 2025-05-14 DIAGNOSIS — D89.1 CRYOGLOBULINEMIA: ICD-10-CM

## 2025-05-14 LAB
ALBUMIN SERPL BCP-MCNC: 4.5 G/DL (ref 3.4–5)
ALP SERPL-CCNC: 55 U/L (ref 33–136)
ALT SERPL W P-5'-P-CCNC: 12 U/L (ref 7–45)
ANION GAP SERPL CALC-SCNC: 13 MMOL/L (ref 10–20)
AST SERPL W P-5'-P-CCNC: 14 U/L (ref 9–39)
BASOPHILS # BLD AUTO: 0.02 X10*3/UL (ref 0–0.1)
BASOPHILS NFR BLD AUTO: 0.5 %
BILIRUB SERPL-MCNC: 0.4 MG/DL (ref 0–1.2)
BUN SERPL-MCNC: 12 MG/DL (ref 6–23)
CALCIUM SERPL-MCNC: 9.7 MG/DL (ref 8.6–10.3)
CHLORIDE SERPL-SCNC: 104 MMOL/L (ref 98–107)
CO2 SERPL-SCNC: 29 MMOL/L (ref 21–32)
CREAT SERPL-MCNC: 0.67 MG/DL (ref 0.5–1.05)
EGFRCR SERPLBLD CKD-EPI 2021: >90 ML/MIN/1.73M*2
EOSINOPHIL # BLD AUTO: 0.03 X10*3/UL (ref 0–0.7)
EOSINOPHIL NFR BLD AUTO: 0.7 %
ERYTHROCYTE [DISTWIDTH] IN BLOOD BY AUTOMATED COUNT: 12.4 % (ref 11.5–14.5)
GLUCOSE SERPL-MCNC: 81 MG/DL (ref 74–99)
HCT VFR BLD AUTO: 38 % (ref 36–46)
HGB BLD-MCNC: 12.8 G/DL (ref 12–16)
IMM GRANULOCYTES # BLD AUTO: 0 X10*3/UL (ref 0–0.7)
IMM GRANULOCYTES NFR BLD AUTO: 0 % (ref 0–0.9)
LYMPHOCYTES # BLD AUTO: 1.26 X10*3/UL (ref 1.2–4.8)
LYMPHOCYTES NFR BLD AUTO: 30.1 %
MCH RBC QN AUTO: 29.7 PG (ref 26–34)
MCHC RBC AUTO-ENTMCNC: 33.7 G/DL (ref 32–36)
MCV RBC AUTO: 88 FL (ref 80–100)
MONOCYTES # BLD AUTO: 0.3 X10*3/UL (ref 0.1–1)
MONOCYTES NFR BLD AUTO: 7.2 %
NEUTROPHILS # BLD AUTO: 2.58 X10*3/UL (ref 1.2–7.7)
NEUTROPHILS NFR BLD AUTO: 61.5 %
PLATELET # BLD AUTO: 184 X10*3/UL (ref 150–450)
POTASSIUM SERPL-SCNC: 3.7 MMOL/L (ref 3.5–5.3)
PROT SERPL-MCNC: 6.9 G/DL (ref 6.4–8.2)
RBC # BLD AUTO: 4.31 X10*6/UL (ref 4–5.2)
SODIUM SERPL-SCNC: 142 MMOL/L (ref 136–145)
WBC # BLD AUTO: 4.2 X10*3/UL (ref 4.4–11.3)

## 2025-05-14 PROCEDURE — 99214 OFFICE O/P EST MOD 30 MIN: CPT | Performed by: INTERNAL MEDICINE

## 2025-05-14 PROCEDURE — 85810 BLOOD VISCOSITY EXAMINATION: CPT

## 2025-05-14 PROCEDURE — 84165 PROTEIN E-PHORESIS SERUM: CPT

## 2025-05-14 PROCEDURE — 85025 COMPLETE CBC W/AUTO DIFF WBC: CPT

## 2025-05-14 PROCEDURE — 80053 COMPREHEN METABOLIC PANEL: CPT

## 2025-05-14 PROCEDURE — 36415 COLL VENOUS BLD VENIPUNCTURE: CPT

## 2025-05-14 PROCEDURE — 86334 IMMUNOFIX E-PHORESIS SERUM: CPT

## 2025-05-14 PROCEDURE — 82784 ASSAY IGA/IGD/IGG/IGM EACH: CPT

## 2025-05-14 PROCEDURE — 83521 IG LIGHT CHAINS FREE EACH: CPT

## 2025-05-14 PROCEDURE — 84155 ASSAY OF PROTEIN SERUM: CPT

## 2025-05-14 ASSESSMENT — PAIN SCALES - GENERAL: PAINLEVEL_OUTOF10: 0-NO PAIN

## 2025-05-14 NOTE — PATIENT INSTRUCTIONS
"We will check full \"waldenstroms\" panel which will help us determine frequency of followup at least for the next calendar year  "

## 2025-05-14 NOTE — PROGRESS NOTES
Patient ID: Beverly Rowan is a 59 y.o. female.  Referring Physician: Sonido Geller MD  [address]  [place] [address]  Primary Care Provider: Chad Malone MD  Visit Type: {Visit Type:84068}      Subjective    HPI    Review of Systems - Oncology     Objective   BSA: There is no height or weight on file to calculate BSA.  /82 (BP Location: Right arm, Patient Position: Sitting, BP Cuff Size: Adult)   Pulse 77   Temp 36 °C (96.8 °F) (Temporal)   Resp 16   SpO2 95%      has a past medical history of Anxiety (2018), Arthritis, Depression, Disease of thyroid gland (1995), Dorsalgia, unspecified (02/12/2020), Encounter for other screening for malignant neoplasm of breast (05/19/2021), Fracture of one rib, left side, subsequent encounter for fracture with routine healing (07/23/2018), Hypothyroidism, Nausea (05/24/2019), Other injury of unspecified body region, initial encounter (06/21/2021), Other malaise (10/22/2020), Other microscopic hematuria (05/24/2019), Pain in thoracic spine (02/24/2020), Personal history of other drug therapy (10/22/2020), Personal history of other infectious and parasitic diseases (05/19/2021), Personal history of other specified conditions (06/27/2022), Personal history of urinary (tract) infections (05/19/2021), PTSD (post-traumatic stress disorder), Right lower quadrant pain (11/27/2019), Stiffness of unspecified hip, not elsewhere classified (02/24/2020), and Unspecified symptoms and signs involving the genitourinary system (05/19/2021).   has a past surgical history that includes Other surgical history (06/18/2021); [place] tooth extraction; and Foot surgery.  Family History[1]  Oncology History    No history exists.       Beverly Rowan  reports that she has quit smoking. Her smoking use included cigarettes. She started smoking about 39 years ago. She has never used smokeless tobacco.  She  reports current alcohol use.  She  reports current drug use. Drug:  "Marijuana.    Physical Exam    WBC   Date/Time Value Ref Range Status   05/01/2025 09:11 AM 5.4 4.4 - 11.3 x10*3/uL Final   06/06/2024 10:25 AM 4.0 (L) 4.4 - 11.3 x10*3/uL Final   05/20/2024 06:59 AM 10.0 4.4 - 11.3 x10*3/uL Final     nRBC   Date Value Ref Range Status   06/06/2024 0.0 0.0 - 0.0 /100 WBCs Final   05/20/2024 0.0 0.0 - 0.0 /100 WBCs Final   05/19/2024 0.0 0.0 - 0.0 /100 WBCs Final     RBC   Date Value Ref Range Status   05/01/2025 4.58 4.00 - 5.20 x10*6/uL Final   06/06/2024 4.47 4.00 - 5.20 x10*6/uL Final   05/20/2024 4.35 4.00 - 5.20 x10*6/uL Final     Hemoglobin   Date Value Ref Range Status   05/01/2025 13.7 12.0 - 16.0 g/dL Final   06/06/2024 13.6 12.0 - 16.0 g/dL Final   05/20/2024 13.1 12.0 - 16.0 g/dL Final     Hematocrit   Date Value Ref Range Status   05/01/2025 40.5 36.0 - 46.0 % Final   06/06/2024 40.0 36.0 - 46.0 % Final   05/20/2024 39.5 36.0 - 46.0 % Final     MCV   Date/Time Value Ref Range Status   05/01/2025 09:11 AM 88 80 - 100 fL Final   06/06/2024 10:25 AM 90 80 - 100 fL Final   05/20/2024 06:59 AM 91 80 - 100 fL Final     MCH   Date/Time Value Ref Range Status   05/01/2025 09:11 AM 29.9 26.0 - 34.0 pg Final   06/06/2024 10:25 AM 30.4 26.0 - 34.0 pg Final   05/20/2024 06:59 AM 30.1 26.0 - 34.0 pg Final     MCHC   Date/Time Value Ref Range Status   05/01/2025 09:11 AM 33.8 32.0 - 36.0 g/dL Final   06/06/2024 10:25 AM 34.0 32.0 - 36.0 g/dL Final   05/20/2024 06:59 AM 33.2 32.0 - 36.0 g/dL Final     RDW   Date/Time Value Ref Range Status   05/01/2025 09:11 AM 12.8 11.5 - 14.5 % Final   06/06/2024 10:25 AM 13.5 11.5 - 14.5 % Final   05/20/2024 06:59 AM 12.4 11.5 - 14.5 % Final     Platelets   Date/Time Value Ref Range Status   05/01/2025 09:11  150 - 450 x10*3/uL Final   06/06/2024 10:25  150 - 450 x10*3/uL Final   05/20/2024 06:59  150 - 450 x10*3/uL Final     No results found for: \"MPV\"  Neutrophils %   Date/Time Value Ref Range Status   05/01/2025 09:11 AM 71.9 " 40.0 - 80.0 % Final   06/06/2024 10:25 AM 68.5 40.0 - 80.0 % Final   05/16/2024 02:23 PM 70.4 40.0 - 80.0 % Final     Immature Granulocytes %, Automated   Date/Time Value Ref Range Status   05/01/2025 09:11 AM 0.2 0.0 - 0.9 % Final     Comment:     Immature Granulocyte Count (IG) includes promyelocytes, myelocytes and metamyelocytes but does not include bands. Percent differential counts (%) should be interpreted in the context of the absolute cell counts (cells/UL).   06/06/2024 10:25 AM 0.2 0.0 - 0.9 % Final     Comment:     Immature Granulocyte Count (IG) includes promyelocytes, myelocytes and metamyelocytes but does not include bands. Percent differential counts (%) should be interpreted in the context of the absolute cell counts (cells/UL).   05/16/2024 02:23 PM 0.2 0.0 - 0.9 % Final     Comment:     Immature Granulocyte Count (IG) includes promyelocytes, myelocytes and metamyelocytes but does not include bands. Percent differential counts (%) should be interpreted in the context of the absolute cell counts (cells/UL).     Lymphocytes %   Date/Time Value Ref Range Status   05/01/2025 09:11 AM 19.1 13.0 - 44.0 % Final   06/06/2024 10:25 AM 23.4 13.0 - 44.0 % Final   05/16/2024 02:23 PM 21.9 13.0 - 44.0 % Final     Monocytes %   Date/Time Value Ref Range Status   05/01/2025 09:11 AM 7.5 2.0 - 10.0 % Final   06/06/2024 10:25 AM 6.5 2.0 - 10.0 % Final   05/16/2024 02:23 PM 6.7 2.0 - 10.0 % Final     Eosinophils %   Date/Time Value Ref Range Status   05/01/2025 09:11 AM 0.7 0.0 - 6.0 % Final   06/06/2024 10:25 AM 0.7 0.0 - 6.0 % Final   05/16/2024 02:23 PM 0.4 0.0 - 6.0 % Final     Basophils %   Date/Time Value Ref Range Status   05/01/2025 09:11 AM 0.6 0.0 - 2.0 % Final   06/06/2024 10:25 AM 0.7 0.0 - 2.0 % Final   05/16/2024 02:23 PM 0.4 0.0 - 2.0 % Final     Neutrophils Absolute   Date/Time Value Ref Range Status   05/01/2025 09:11 AM 3.85 1.20 - 7.70 x10*3/uL Final     Comment:     Percent differential counts  "(%) should be interpreted in the context of the absolute cell counts (cells/uL).   06/06/2024 10:25 AM 2.74 1.20 - 7.70 x10*3/uL Final     Comment:     Percent differential counts (%) should be interpreted in the context of the absolute cell counts (cells/uL).   05/16/2024 02:23 PM 3.48 1.20 - 7.70 x10*3/uL Final     Comment:     Percent differential counts (%) should be interpreted in the context of the absolute cell counts (cells/uL).     Immature Granulocytes Absolute, Automated   Date/Time Value Ref Range Status   05/01/2025 09:11 AM 0.01 0.00 - 0.70 x10*3/uL Final   06/06/2024 10:25 AM 0.01 0.00 - 0.70 x10*3/uL Final   05/16/2024 02:23 PM 0.01 0.00 - 0.70 x10*3/uL Final     Lymphocytes Absolute   Date/Time Value Ref Range Status   05/01/2025 09:11 AM 1.02 (L) 1.20 - 4.80 x10*3/uL Final   06/06/2024 10:25 AM 0.94 (L) 1.20 - 4.80 x10*3/uL Final   05/16/2024 02:23 PM 1.08 (L) 1.20 - 4.80 x10*3/uL Final     Monocytes Absolute   Date/Time Value Ref Range Status   05/01/2025 09:11 AM 0.40 0.10 - 1.00 x10*3/uL Final   06/06/2024 10:25 AM 0.26 0.10 - 1.00 x10*3/uL Final   05/16/2024 02:23 PM 0.33 0.10 - 1.00 x10*3/uL Final     Eosinophils Absolute   Date/Time Value Ref Range Status   05/01/2025 09:11 AM 0.04 0.00 - 0.70 x10*3/uL Final   06/06/2024 10:25 AM 0.03 0.00 - 0.70 x10*3/uL Final   05/16/2024 02:23 PM 0.02 0.00 - 0.70 x10*3/uL Final     Basophils Absolute   Date/Time Value Ref Range Status   05/01/2025 09:11 AM 0.03 0.00 - 0.10 x10*3/uL Final   06/06/2024 10:25 AM 0.03 0.00 - 0.10 x10*3/uL Final   05/16/2024 02:23 PM 0.02 0.00 - 0.10 x10*3/uL Final       No components found for: \"PT\"  aPTT   Date/Time Value Ref Range Status   08/30/2022 02:22 PM 32 26 - 39 sec Final     Comment:       THE APTT IS NO LONGER USED FOR MONITORING     UNFRACTIONATED HEPARIN THERAPY.    FOR MONITORING HEPARIN THERAPY,     USE THE HEPARIN ASSAY.         Assessment/Plan         {Assess/PlanSmartLinks:30814}         Sonido Geller, " MD                                [1]  Family History  Problem Relation Name Age of Onset   • Alcohol abuse Father Marco Rowan    • Heart disease Father Marco Rowan    • Hypertension Father Marco Rowan     "hours if needed for anxiety. Chad Malone MD  Active   diclofenac (Voltaren) 75 mg EC tablet [id number] Yes Take 1 tablet (75 mg) by mouth 2 times a day. Daron Condon MD  Active   ibuprofen 800 mg tablet [id number] Yes Take 1 tablet (800 mg) by mouth 3 times a day as needed for mild pain (1 - 3) (pain). Chad Malone MD  Active   LevoxyL 100 mcg tablet [id number] Yes TAKE ONE TABLET BY MOUTH EVERY MORNING BEFORE A MEAL (KATHLEEN) Chad Malone MD  Active   omeprazole (PriLOSEC) 20 mg DR capsule [id number] Yes Take 1 capsule (20 mg) by mouth once daily in the morning. Take before meals. Historical Provider, MD Taking Active   sertraline (Zoloft) 100 mg tablet [id number] Yes Take 2 tablets (200 mg) by mouth once daily. Patient reports taking 100mg in morning and 100mg at bedtime. Historical Provider, MD Taking Active   traZODone (Desyrel) 50 mg tablet [id number] Yes Take 1 tablet (50 mg) by mouth once daily at bedtime. Historical Provider, MD Taking Active                   Assessment/Plan    1) abnormal SPEP  -additional workup in 12/2024 was done post-transverse myelitis  -B6 278.5  -SPEP/IF with faint discrete IgM lambda band in gamma region  -UPEP/IF negative  -JANENE positive, homogeneous, 1:160, reflex AMOS panel negative  -celiac panel negative  -cryoglobulin screen: positive  -kappa LC 1.79, lambda LC 1.26  -unclear if her sweats and low grade fevers are related  -lymphoplasmacytic lymphoma and multiple myeloma are both associated with IgM M proteins, and while active disease for either would be associated with much higher bands rather than just a \"faint band\" the only way to definitively rule out hematologic malignancy would be with bone marrow biopsy/aspiration  -she is in agreement to proceed with bmbx  -here for interval followup to review bone marrow bx  -hypercellular bone marrow  (70%) with multifocal involvement by CD5+ B cell clonal process; normal maturing trilineage hematopoiesis; absent " iron stores; several lymphoid aggregates were noted on the clot section constituting roughly 10-15% of the marrow space; by flow cytometry a clonal lambda+, CD5+, CD23 dim, CD43 negative B cell population was detected; phenotype of the cells is atypical for the chronic lymphocytic leukemia due to low expression of CD23 and absence of CD43; a CD5+ lymphoplasmacytic lymphoma like process is favored as an IgM paraprotein is noted; the level of marrow involvement appears to be just above for that suggested for an MGUS  -MYD88 L265P mutation low level detected  -focused lymphoma panel none detected  -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  -this is a low grade non-Hodgkins lymphoma that does not require therapy at time of discovery, but may eventually need to be treated given its unique association with increased thrombotic tendency (on both the venous and arterial sides) due to the large IgM pentamer  -will track her labs serially over the next 12 months  -today will check CBC, COMP, SPEP/IF, quantitative immunoglobulins, serum FLC and serum viscosity     2) transverse myelitis  -was admitted to [facility] in 4/2024 for 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  -was treated with IV solumedrol and IV acyclovir  -had lumbar puncture done     3) anxiety  -on buspar  -on valium  -on zoloft     4) hypothyroidism  -on levoxyl     Problem List Items Addressed This Visit           ICD-10-CM    Cryoglobulinemia (CMS-HCC) D89.1     Other Visit Diagnoses         Codes      Lymphoplasmacytic lymphoma (Multi)     C83.00    Relevant Orders    CBC and Auto Differential (Completed)    Comprehensive Metabolic Panel (Completed)    Serum Protein Electrophoresis    Immunoglobulins (IgG, IgA, IgM) (Completed)    Viscosity, Serum (Completed)    [place]/Lambda Free Light Chain, Serum (Completed)    Clinic Appointment Request Virtual Est; LAYO QUE[facility]; [facility]  [id number]                 Sonido Geller MD                              [1]   Family History  Problem Relation Name Age of Onset    Alcohol abuse Father Marco Rowan     Heart disease Father Marco Rowan     Hypertension Father Marco Rowan

## 2025-05-15 LAB
HETEROPH AB SER QL: 1.82 CP (ref 1.5–1.8)
IGA SERPL-MCNC: 260 MG/DL (ref 70–400)
IGG SERPL-MCNC: 655 MG/DL (ref 700–1600)
IGM SERPL-MCNC: 319 MG/DL (ref 40–230)
KAPPA LC SERPL-MCNC: 1.47 MG/DL (ref 0.33–1.94)
KAPPA LC/LAMBDA SER: 1.41 {RATIO} (ref 0.26–1.65)
LAMBDA LC SERPL-MCNC: 1.04 MG/DL (ref 0.57–2.63)
PROT SERPL-MCNC: 7 G/DL (ref 6.4–8.2)

## 2025-05-16 LAB
PATH REPORT.ADDENDUM SPEC: NORMAL
PATH REPORT.COMMENTS IMP SPEC: NORMAL
PATH REPORT.FINAL DX SPEC: NORMAL
PATH REPORT.GROSS SPEC: NORMAL
PATH REPORT.MICROSCOPIC SPEC OTHER STN: NORMAL
PATH REPORT.RELEVANT HX SPEC: NORMAL
PATH REPORT.TOTAL CANCER: NORMAL

## 2025-05-19 PROBLEM — C83.00 LYMPHOPLASMACYTIC LYMPHOMA (MULTI): Status: ACTIVE | Noted: 2025-05-19

## 2025-05-19 ASSESSMENT — ENCOUNTER SYMPTOMS
RESPIRATORY NEGATIVE: 1
GASTROINTESTINAL NEGATIVE: 1
NEUROLOGICAL NEGATIVE: 1
HEMATOLOGIC/LYMPHATIC NEGATIVE: 1
CONSTITUTIONAL NEGATIVE: 1
NERVOUS/ANXIOUS: 1
CARDIOVASCULAR NEGATIVE: 1
MUSCULOSKELETAL NEGATIVE: 1
ENDOCRINE NEGATIVE: 1
EYES NEGATIVE: 1

## 2025-05-20 LAB
ALBUMIN: 4.4 G/DL (ref 3.4–5)
ALPHA 1 GLOBULIN: 0.3 G/DL (ref 0.2–0.6)
ALPHA 2 GLOBULIN: 0.6 G/DL (ref 0.4–1.1)
BETA GLOBULIN: 0.9 G/DL (ref 0.5–1.2)
GAMMA GLOBULIN: 0.8 G/DL (ref 0.5–1.4)
IMMUNOFIXATION COMMENT: NORMAL
PATH REVIEW - SERUM IMMUNOFIXATION: NORMAL
PATH REVIEW-SERUM PROTEIN ELECTROPHORESIS: NORMAL
PROTEIN ELECTROPHORESIS COMMENT: NORMAL

## 2025-05-21 ENCOUNTER — APPOINTMENT (OUTPATIENT)
Dept: HEMATOLOGY/ONCOLOGY | Facility: CLINIC | Age: 60
End: 2025-05-21
Payer: COMMERCIAL

## 2025-05-21 ENCOUNTER — TELEPHONE (OUTPATIENT)
Dept: PRIMARY CARE | Facility: CLINIC | Age: 60
End: 2025-05-21
Payer: COMMERCIAL

## 2025-05-21 NOTE — TELEPHONE ENCOUNTER
Beverly ZHAO Do [id number] [place] [id number] Clinical Support Staff (supporting Chad Malone MD) (Selected Message)        5/20/25  1:16 PM  Please show Dr. Malone this message. At my last visit with you I told you I just wasn’t feeling like myself and that I was waiting for the results of the cryoglobulin test Dr. Palencia ordered. Well it came back abnormal which then required a bone marrow biopsy. I met with Dr Geller at the [facility][note 3] and was told I have low grade non-Hodgkins lymphoma. Still in shock and denial. Thought you should know. ……..Beverly

## 2025-05-23 ENCOUNTER — TELEMEDICINE (OUTPATIENT)
Dept: HEMATOLOGY/ONCOLOGY | Facility: CLINIC | Age: 60
End: 2025-05-23
Payer: COMMERCIAL

## 2025-05-23 DIAGNOSIS — C83.00 LYMPHOPLASMACYTIC LYMPHOMA (MULTI): Primary | ICD-10-CM

## 2025-05-23 DIAGNOSIS — D89.1 CRYOGLOBULINEMIA: ICD-10-CM

## 2025-05-23 DIAGNOSIS — F41.9 ANXIETY: ICD-10-CM

## 2025-05-23 DIAGNOSIS — G37.3 TRANSVERSE MYELITIS (MULTI): ICD-10-CM

## 2025-05-23 DIAGNOSIS — E03.9 HYPOTHYROIDISM, UNSPECIFIED TYPE: ICD-10-CM

## 2025-05-23 PROCEDURE — 99213 OFFICE O/P EST LOW 20 MIN: CPT | Performed by: INTERNAL MEDICINE

## 2025-05-23 NOTE — PROGRESS NOTES
Patient ID: Beverly Rowan is a 59 y.o. female.  Referring Physician: Sonido Geller MD  [address]  [place] [address]  Primary Care Provider: Chad Malone MD  Visit Type:  Follow Up     Virtual or Telephone Consent    An interactive audio and video telecommunication system which permits real time communications between the patient (at the originating site) and provider (at the distant site) was utilized to provide this telehealth service.   Verbal consent was requested and obtained from Beverly Rowan on this date, 05/23/2025 for a telehealth visit and the patient's location was confirmed at the time of the visit.     Subjective    HPI How was my labwork?    Review of Systems   Constitutional: Negative.    HENT:  Negative.     Eyes: Negative.    Respiratory: Negative.     Cardiovascular: Negative.    Gastrointestinal: Negative.    Endocrine: Negative.    Genitourinary: Negative.     Musculoskeletal: Negative.    Skin: Negative.    Neurological: Negative.    Hematological: Negative.    Psychiatric/Behavioral: Negative.          Objective   BSA: There is no height or weight on file to calculate BSA.  There were no vitals taken for this visit.     has a past medical history of Anxiety (2018), Arthritis, Depression, Disease of thyroid gland (1995), Dorsalgia, unspecified (02/12/2020), Encounter for other screening for malignant neoplasm of breast (05/19/2021), Fracture of one rib, left side, subsequent encounter for fracture with routine healing (07/23/2018), Hypothyroidism, Nausea (05/24/2019), Other injury of unspecified body region, initial encounter (06/21/2021), Other malaise (10/22/2020), Other microscopic hematuria (05/24/2019), Pain in thoracic spine (02/24/2020), Personal history of other drug therapy (10/22/2020), Personal history of other infectious and parasitic diseases (05/19/2021), Personal history of other specified conditions (06/27/2022), Personal history of urinary (tract)  infections (05/19/2021), PTSD (post-traumatic stress disorder), Right lower quadrant pain (11/27/2019), Stiffness of unspecified hip, not elsewhere classified (02/24/2020), and Unspecified symptoms and signs involving the genitourinary system (05/19/2021).   has a past surgical history that includes Other surgical history (06/18/2021); [place] tooth extraction; and Foot surgery.  Family History[1]  Oncology History    No history exists.       Beverly Rowan  reports that she has quit smoking. Her smoking use included cigarettes. She started smoking about 39 years ago. She has never used smokeless tobacco.  She  reports current alcohol use.  She  reports current drug use. Drug: Marijuana.    Physical Exam    WBC   Date/Time Value Ref Range Status   05/14/2025 04:39 PM 4.2 (L) 4.4 - 11.3 x10*3/uL Final   05/01/2025 09:11 AM 5.4 4.4 - 11.3 x10*3/uL Final   06/06/2024 10:25 AM 4.0 (L) 4.4 - 11.3 x10*3/uL Final     nRBC   Date Value Ref Range Status   06/06/2024 0.0 0.0 - 0.0 /100 WBCs Final   05/20/2024 0.0 0.0 - 0.0 /100 WBCs Final   05/19/2024 0.0 0.0 - 0.0 /100 WBCs Final     RBC   Date Value Ref Range Status   05/14/2025 4.31 4.00 - 5.20 x10*6/uL Final   05/01/2025 4.58 4.00 - 5.20 x10*6/uL Final   06/06/2024 4.47 4.00 - 5.20 x10*6/uL Final     Hemoglobin   Date Value Ref Range Status   05/14/2025 12.8 12.0 - 16.0 g/dL Final   05/01/2025 13.7 12.0 - 16.0 g/dL Final   06/06/2024 13.6 12.0 - 16.0 g/dL Final     Hematocrit   Date Value Ref Range Status   05/14/2025 38.0 36.0 - 46.0 % Final   05/01/2025 40.5 36.0 - 46.0 % Final   06/06/2024 40.0 36.0 - 46.0 % Final     MCV   Date/Time Value Ref Range Status   05/14/2025 04:39 PM 88 80 - 100 fL Final   05/01/2025 09:11 AM 88 80 - 100 fL Final   06/06/2024 10:25 AM 90 80 - 100 fL Final     MCH   Date/Time Value Ref Range Status   05/14/2025 04:39 PM 29.7 26.0 - 34.0 pg Final   05/01/2025 09:11 AM 29.9 26.0 - 34.0 pg Final   06/06/2024 10:25 AM 30.4 26.0 - 34.0 pg Final  "    MCHC   Date/Time Value Ref Range Status   05/14/2025 04:39 PM 33.7 32.0 - 36.0 g/dL Final   05/01/2025 09:11 AM 33.8 32.0 - 36.0 g/dL Final   06/06/2024 10:25 AM 34.0 32.0 - 36.0 g/dL Final     RDW   Date/Time Value Ref Range Status   05/14/2025 04:39 PM 12.4 11.5 - 14.5 % Final   05/01/2025 09:11 AM 12.8 11.5 - 14.5 % Final   06/06/2024 10:25 AM 13.5 11.5 - 14.5 % Final     Platelets   Date/Time Value Ref Range Status   05/14/2025 04:39  150 - 450 x10*3/uL Final   05/01/2025 09:11  150 - 450 x10*3/uL Final   06/06/2024 10:25  150 - 450 x10*3/uL Final     No results found for: \"MPV\"  Neutrophils %   Date/Time Value Ref Range Status   05/14/2025 04:39 PM 61.5 40.0 - 80.0 % Final   05/01/2025 09:11 AM 71.9 40.0 - 80.0 % Final   06/06/2024 10:25 AM 68.5 40.0 - 80.0 % Final     Immature Granulocytes %, Automated   Date/Time Value Ref Range Status   05/14/2025 04:39 PM 0.0 0.0 - 0.9 % Final     Comment:     Immature Granulocyte Count (IG) includes promyelocytes, myelocytes and metamyelocytes but does not include bands. Percent differential counts (%) should be interpreted in the context of the absolute cell counts (cells/UL).   05/01/2025 09:11 AM 0.2 0.0 - 0.9 % Final     Comment:     Immature Granulocyte Count (IG) includes promyelocytes, myelocytes and metamyelocytes but does not include bands. Percent differential counts (%) should be interpreted in the context of the absolute cell counts (cells/UL).   06/06/2024 10:25 AM 0.2 0.0 - 0.9 % Final     Comment:     Immature Granulocyte Count (IG) includes promyelocytes, myelocytes and metamyelocytes but does not include bands. Percent differential counts (%) should be interpreted in the context of the absolute cell counts (cells/UL).     Lymphocytes %   Date/Time Value Ref Range Status   05/14/2025 04:39 PM 30.1 13.0 - 44.0 % Final   05/01/2025 09:11 AM 19.1 13.0 - 44.0 % Final   06/06/2024 10:25 AM 23.4 13.0 - 44.0 % Final     Monocytes % "   Date/Time Value Ref Range Status   05/14/2025 04:39 PM 7.2 2.0 - 10.0 % Final   05/01/2025 09:11 AM 7.5 2.0 - 10.0 % Final   06/06/2024 10:25 AM 6.5 2.0 - 10.0 % Final     Eosinophils %   Date/Time Value Ref Range Status   05/14/2025 04:39 PM 0.7 0.0 - 6.0 % Final   05/01/2025 09:11 AM 0.7 0.0 - 6.0 % Final   06/06/2024 10:25 AM 0.7 0.0 - 6.0 % Final     Basophils %   Date/Time Value Ref Range Status   05/14/2025 04:39 PM 0.5 0.0 - 2.0 % Final   05/01/2025 09:11 AM 0.6 0.0 - 2.0 % Final   06/06/2024 10:25 AM 0.7 0.0 - 2.0 % Final     Neutrophils Absolute   Date/Time Value Ref Range Status   05/14/2025 04:39 PM 2.58 1.20 - 7.70 x10*3/uL Final     Comment:     Percent differential counts (%) should be interpreted in the context of the absolute cell counts (cells/uL).   05/01/2025 09:11 AM 3.85 1.20 - 7.70 x10*3/uL Final     Comment:     Percent differential counts (%) should be interpreted in the context of the absolute cell counts (cells/uL).   06/06/2024 10:25 AM 2.74 1.20 - 7.70 x10*3/uL Final     Comment:     Percent differential counts (%) should be interpreted in the context of the absolute cell counts (cells/uL).     Immature Granulocytes Absolute, Automated   Date/Time Value Ref Range Status   05/14/2025 04:39 PM 0.00 0.00 - 0.70 x10*3/uL Final   05/01/2025 09:11 AM 0.01 0.00 - 0.70 x10*3/uL Final   06/06/2024 10:25 AM 0.01 0.00 - 0.70 x10*3/uL Final     Lymphocytes Absolute   Date/Time Value Ref Range Status   05/14/2025 04:39 PM 1.26 1.20 - 4.80 x10*3/uL Final   05/01/2025 09:11 AM 1.02 (L) 1.20 - 4.80 x10*3/uL Final   06/06/2024 10:25 AM 0.94 (L) 1.20 - 4.80 x10*3/uL Final     Monocytes Absolute   Date/Time Value Ref Range Status   05/14/2025 04:39 PM 0.30 0.10 - 1.00 x10*3/uL Final   05/01/2025 09:11 AM 0.40 0.10 - 1.00 x10*3/uL Final   06/06/2024 10:25 AM 0.26 0.10 - 1.00 x10*3/uL Final     Eosinophils Absolute   Date/Time Value Ref Range Status   05/14/2025 04:39 PM 0.03 0.00 - 0.70 x10*3/uL Final  "  05/01/2025 09:11 AM 0.04 0.00 - 0.70 x10*3/uL Final   06/06/2024 10:25 AM 0.03 0.00 - 0.70 x10*3/uL Final     Basophils Absolute   Date/Time Value Ref Range Status   05/14/2025 04:39 PM 0.02 0.00 - 0.10 x10*3/uL Final   05/01/2025 09:11 AM 0.03 0.00 - 0.10 x10*3/uL Final   06/06/2024 10:25 AM 0.03 0.00 - 0.10 x10*3/uL Final       No components found for: \"PT\"  aPTT   Date/Time Value Ref Range Status   08/30/2022 02:22 PM 32 26 - 39 sec Final     Comment:       THE APTT IS NO LONGER USED FOR MONITORING     UNFRACTIONATED HEPARIN THERAPY.    FOR MONITORING HEPARIN THERAPY,     USE THE HEPARIN ASSAY.         Assessment/Plan    1) abnormal SPEP  -additional workup in 12/2024 was done post-transverse myelitis  -B6 278.5  -SPEP/IF with faint discrete IgM lambda band in gamma region  -UPEP/IF negative  -JANENE positive, homogeneous, 1:160, reflex AMOS panel negative  -celiac panel negative  -cryoglobulin screen: positive  -kappa LC 1.79, lambda LC 1.26  -unclear if her sweats and low grade fevers are related  -lymphoplasmacytic lymphoma and multiple myeloma are both associated with IgM M proteins, and while active disease for either would be associated with much higher bands rather than just a \"faint band\" the only way to definitively rule out hematologic malignancy would be with bone marrow biopsy/aspiration  -she is in agreement to proceed with bmbx  -here for interval followup to review bone marrow bx  -hypercellular bone marrow  (70%) with multifocal involvement by CD5+ B cell clonal process; normal maturing trilineage hematopoiesis; absent iron stores; several lymphoid aggregates were noted on the clot section constituting roughly 10-15% of the marrow space; by flow cytometry a clonal lambda+, CD5+, CD23 dim, CD43 negative B cell population was detected; phenotype of the cells is atypical for the chronic lymphocytic leukemia due to low expression of CD23 and absence of CD43; a CD5+ lymphoplasmacytic lymphoma like " process is favored as an IgM paraprotein is noted; the level of marrow involvement appears to be just above for that suggested for an MGUS  -MYD88 L265P mutation low level detected  -focused lymphoma panel none detected  -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  -this is a low grade non-Hodgkins lymphoma that does not require therapy at time of discovery, but may eventually need to be treated given its unique association with increased thrombotic tendency (on both the venous and arterial sides) due to the large IgM pentamer  -will track her labs serially over the next 12 months  -today will check CBC, COMP, SPEP/IF, quantitative immunoglobulins, serum FLC and serum viscosity  -here for interval followup via virtual/telehealth modality  -wbc 4.2, hgb 12.8, plt 184,000, ANC 2580, creatinine 0.67, calcium 9.7, alk phos 55, AST 14, total bili 0.4, ALT 12  -SPE/IF with faint monoclonal IgM lambda in gamma region at a level too low to quantitate  -IgG 655, IgA 260, IgM 319, kappa LC 1.47, lambda LC 1.04  -serum viscosity 1.82  -will see her again in 4 months     2) transverse myelitis  -was admitted to [facility] in 4/2024 for 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

## 2025-05-26 ASSESSMENT — ENCOUNTER SYMPTOMS
ENDOCRINE NEGATIVE: 1
CARDIOVASCULAR NEGATIVE: 1
HEMATOLOGIC/LYMPHATIC NEGATIVE: 1
RESPIRATORY NEGATIVE: 1
GASTROINTESTINAL NEGATIVE: 1
PSYCHIATRIC NEGATIVE: 1
CONSTITUTIONAL NEGATIVE: 1
MUSCULOSKELETAL NEGATIVE: 1
EYES NEGATIVE: 1
NEUROLOGICAL NEGATIVE: 1

## 2025-07-03 ENCOUNTER — TELEPHONE (OUTPATIENT)
Dept: PRIMARY CARE | Facility: CLINIC | Age: 60
End: 2025-07-03
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
## 2025-08-20 ENCOUNTER — APPOINTMENT (OUTPATIENT)
Dept: NEUROLOGY | Facility: CLINIC | Age: 60
End: 2025-08-20
Payer: COMMERCIAL

## 2025-08-26 ENCOUNTER — TELEPHONE (OUTPATIENT)
Dept: PRIMARY CARE | Facility: CLINIC | Age: 60
End: 2025-08-26
Payer: COMMERCIAL

## 2025-08-28 ENCOUNTER — TELEMEDICINE (OUTPATIENT)
Dept: NEUROLOGY | Facility: CLINIC | Age: 60
End: 2025-08-28
Payer: COMMERCIAL

## 2025-08-28 ENCOUNTER — TELEPHONE (OUTPATIENT)
Dept: NEUROLOGY | Facility: CLINIC | Age: 60
End: 2025-08-28

## 2025-08-28 ENCOUNTER — APPOINTMENT (OUTPATIENT)
Dept: NEUROLOGY | Facility: CLINIC | Age: 60
End: 2025-08-28
Payer: COMMERCIAL

## 2025-08-28 DIAGNOSIS — C83.00 LYMPHOPLASMACYTIC LYMPHOMA (MULTI): ICD-10-CM

## 2025-08-28 DIAGNOSIS — G62.9 SMALL FIBER NEUROPATHY: ICD-10-CM

## 2025-08-28 DIAGNOSIS — G37.3 TRANSVERSE MYELITIS (MULTI): Primary | ICD-10-CM

## 2025-08-28 DIAGNOSIS — D89.1 CRYOGLOBULINEMIA: ICD-10-CM

## 2025-08-28 DIAGNOSIS — M54.2 CERVICALGIA: ICD-10-CM

## 2025-08-28 DIAGNOSIS — M54.12 CERVICAL RADICULOPATHY: ICD-10-CM

## 2025-08-28 PROBLEM — R77.8 ABNORMAL SPEP: Status: RESOLVED | Noted: 2025-04-12 | Resolved: 2025-08-28

## 2025-08-28 PROBLEM — R20.0 BILATERAL LEG NUMBNESS: Status: RESOLVED | Noted: 2024-05-18 | Resolved: 2025-08-28

## 2025-08-28 PROCEDURE — 99213 OFFICE O/P EST LOW 20 MIN: CPT | Performed by: STUDENT IN AN ORGANIZED HEALTH CARE EDUCATION/TRAINING PROGRAM

## 2026-04-23 ENCOUNTER — APPOINTMENT (OUTPATIENT)
Dept: NEUROLOGY | Facility: CLINIC | Age: 61
End: 2026-04-23
Payer: COMMERCIAL

## (undated) DEVICE — CANNULA, ARTHROSCOPIC TWIST-IN 7MM

## (undated) DEVICE — STAR SLEEVE, COBAN, STRL

## (undated) DEVICE — POSITIONER, CRADLE, HEAD, MEDC, FOAM SLOTTED

## (undated) DEVICE — CANNULA, GRAY, 5.5

## (undated) DEVICE — Device

## (undated) DEVICE — PROTECTOR, NERVE, ULNAR, PINK

## (undated) DEVICE — SUTURE, TAPE, 36 IN X 1.3 IN, TAPERED END/ NEEDLE

## (undated) DEVICE — SOLUTION, IRRIGATION, USP, SODIUM CHLORIDE 0.9%, 3000 ML

## (undated) DEVICE — GLOVE, SURGICAL, PROTEXIS PI , 7.5, PF, LF

## (undated) DEVICE — MANIFOLD, 4 PORT NEPTUNE STANDARD

## (undated) DEVICE — TUBING, PATIENT 8FT STERILE

## (undated) DEVICE — BANDAGE, COFLEX, 6 X 5 YDS, FOAM TAN, STERILE, LF

## (undated) DEVICE — STRAP, ARM BOARD, 32 X 1.5

## (undated) DEVICE — PROBE, CRUISE ENERGY, ARTHOSCOPIC SERFAS 90-S 4.0MM

## (undated) DEVICE — BURR, STRYKER, 5.0MM BRL 6FLT

## (undated) DEVICE — STRAP, VELCRO, BODY, 4 X 60IN, NS

## (undated) DEVICE — SUTURE, ETHILON, 3-0, 18 IN, PS1, BLACK

## (undated) DEVICE — SUTURE SYSTEM, EXPRESSEW III NEEDLES

## (undated) DEVICE — BLADE, STRYKER, 4.0MM, RESECTOR, F-SERIES

## (undated) DEVICE — COVER, MAYO STAND, W/PAD, 23 IN, DISPOSABLE, PLASTIC, LF, STERILE

## (undated) DEVICE — SOLUTION, IRRIGATION, STERILE WATER, 1000 ML, POUR BOTTLE

## (undated) DEVICE — BOWL, BASIN, 32 OZ, STERILE

## (undated) DEVICE — TUBING, PUMP REDEUCE 8FT STERILE

## (undated) DEVICE — GLOVE, SURGICAL, PROTEXIS PI , 7.0, PF, LF

## (undated) DEVICE — TUBING, SUCTION, 6MM X 10, CLEAN N-COND

## (undated) DEVICE — PREP, IODOPHOR, W/ALCOHOL, DURAPREP, W/APPLICATOR, 26 CC

## (undated) DEVICE — SLING, ARM, W/LEATHERETTE PAD, MEDIUM

## (undated) DEVICE — MAT, FLOOR, STANDARD FLUID BARRIER, 32X44, GREEN

## (undated) DEVICE — HOLSTER, ELECTROSURGERY ACCESSORY, STERILE

## (undated) DEVICE — GLOVE, SURGICAL, PROTEXIS PI BLUE W/NEUTHERA, 7.0, PF, LF

## (undated) DEVICE — TAPE, SURGICAL, FOAM, MICROFOAM, HYPOALLERGENIC, 4 IN X 5.5 YD